# Patient Record
Sex: MALE | Race: WHITE | NOT HISPANIC OR LATINO | Employment: OTHER | URBAN - METROPOLITAN AREA
[De-identification: names, ages, dates, MRNs, and addresses within clinical notes are randomized per-mention and may not be internally consistent; named-entity substitution may affect disease eponyms.]

---

## 2014-01-09 LAB — POTASSIUM SERPL-SCNC: 4.9 MMOL/L (ref 3.6–5.2)

## 2014-06-09 LAB
CHOLEST SERPL-MCNC: 156 MG/DL
CREAT SERPL-MCNC: 1.4 MG/DL (ref 0.8–1.3)
GFR SERPL CREATININE-BSD FRML MDRD: 52 ML/MIN/1.73M2
HDLC SERPL-MCNC: 46 MG/DL
LDLC SERPL CALC-MCNC: 80 MG/DL
NONHDLC SERPL-MCNC: 110 MG/DL
TRIGL SERPL-MCNC: 151 MG/DL

## 2016-09-07 LAB
CREAT SERPL-MCNC: 2.4 MG/DL (ref 0.8–1.3)
GFR SERPL CREATININE-BSD FRML MDRD: 28 ML/MIN/1.73M2
POTASSIUM SERPL-SCNC: 4.7 MMOL/L (ref 3.6–5.2)

## 2016-10-26 LAB
CREAT SERPL-MCNC: 1.9 MG/DL (ref 0.8–1.3)
GFR SERPL CREATININE-BSD FRML MDRD: 36 ML/MIN/1.73M2
POTASSIUM SERPL-SCNC: 4.6 MMOL/L (ref 3.6–5.2)

## 2017-01-03 ENCOUNTER — HOSPITAL ENCOUNTER (OUTPATIENT)
Dept: RESPIRATORY THERAPY | Facility: CLINIC | Age: 62
Discharge: HOME | End: 2017-01-03

## 2017-01-03 ENCOUNTER — AMBULATORY - HEALTHEAST (OUTPATIENT)
Dept: RESPIRATORY THERAPY | Facility: CLINIC | Age: 62
End: 2017-01-03

## 2017-01-03 DIAGNOSIS — Z29.89 NEED FOR PNEUMOCYSTIS PROPHYLAXIS: ICD-10-CM

## 2017-01-06 ENCOUNTER — COMMUNICATION - HEALTHEAST (OUTPATIENT)
Dept: FAMILY MEDICINE | Facility: CLINIC | Age: 62
End: 2017-01-06

## 2017-01-23 ENCOUNTER — COMMUNICATION - HEALTHEAST (OUTPATIENT)
Dept: INTERNAL MEDICINE | Facility: CLINIC | Age: 62
End: 2017-01-23

## 2017-01-23 ENCOUNTER — AMBULATORY - HEALTHEAST (OUTPATIENT)
Dept: LAB | Facility: CLINIC | Age: 62
End: 2017-01-23

## 2017-01-23 DIAGNOSIS — N28.9 NEPHROPATHY: ICD-10-CM

## 2017-01-23 DIAGNOSIS — I26.99 PULMONARY EMBOLISM (H): ICD-10-CM

## 2017-01-26 LAB
CREAT SERPL-MCNC: 1.7 MG/DL (ref 0.8–1.3)
GFR SERPL CREATININE-BSD FRML MDRD: 41 ML/MIN/1.73M2
POTASSIUM SERPL-SCNC: 4.5 MMOL/L (ref 3.6–5.2)

## 2017-02-02 ENCOUNTER — HOSPITAL ENCOUNTER (OUTPATIENT)
Dept: RESPIRATORY THERAPY | Facility: CLINIC | Age: 62
Discharge: HOME | End: 2017-02-02

## 2017-02-02 ENCOUNTER — AMBULATORY - HEALTHEAST (OUTPATIENT)
Dept: CARDIOLOGY | Facility: CLINIC | Age: 62
End: 2017-02-02

## 2017-02-02 ENCOUNTER — COMMUNICATION - HEALTHEAST (OUTPATIENT)
Dept: SCHEDULING | Facility: CLINIC | Age: 62
End: 2017-02-02

## 2017-02-02 DIAGNOSIS — B59 PNEUMOCYSTIS JIROVECI PNEUMONIA (H): ICD-10-CM

## 2017-02-02 DIAGNOSIS — Z29.89 NEED FOR PNEUMOCYSTIS PROPHYLAXIS: ICD-10-CM

## 2017-02-02 DIAGNOSIS — E78.5 HYPERLIPIDEMIA: ICD-10-CM

## 2017-02-06 ENCOUNTER — RECORDS - HEALTHEAST (OUTPATIENT)
Dept: ADMINISTRATIVE | Facility: OTHER | Age: 62
End: 2017-02-06

## 2017-02-09 ENCOUNTER — RECORDS - HEALTHEAST (OUTPATIENT)
Dept: ADMINISTRATIVE | Facility: OTHER | Age: 62
End: 2017-02-09

## 2017-02-10 ENCOUNTER — COMMUNICATION - HEALTHEAST (OUTPATIENT)
Dept: FAMILY MEDICINE | Facility: CLINIC | Age: 62
End: 2017-02-10

## 2017-02-10 DIAGNOSIS — H10.10 ALLERGIC CONJUNCTIVITIS, UNSPECIFIED LATERALITY: ICD-10-CM

## 2017-02-13 ENCOUNTER — RECORDS - HEALTHEAST (OUTPATIENT)
Dept: ADMINISTRATIVE | Facility: OTHER | Age: 62
End: 2017-02-13

## 2017-02-14 ENCOUNTER — COMMUNICATION - HEALTHEAST (OUTPATIENT)
Dept: FAMILY MEDICINE | Facility: CLINIC | Age: 62
End: 2017-02-14

## 2017-02-14 ENCOUNTER — AMBULATORY - HEALTHEAST (OUTPATIENT)
Dept: LAB | Facility: CLINIC | Age: 62
End: 2017-02-14

## 2017-02-14 DIAGNOSIS — N28.9 NEPHROPATHY: ICD-10-CM

## 2017-02-14 DIAGNOSIS — I26.99 PULMONARY EMBOLISM (H): ICD-10-CM

## 2017-02-21 ENCOUNTER — AMBULATORY - HEALTHEAST (OUTPATIENT)
Dept: LAB | Facility: CLINIC | Age: 62
End: 2017-02-21

## 2017-02-21 ENCOUNTER — COMMUNICATION - HEALTHEAST (OUTPATIENT)
Dept: NURSING | Facility: CLINIC | Age: 62
End: 2017-02-21

## 2017-02-21 DIAGNOSIS — N28.9 NEPHROPATHY: ICD-10-CM

## 2017-02-21 DIAGNOSIS — I26.99 PULMONARY EMBOLISM (H): ICD-10-CM

## 2017-02-22 ENCOUNTER — COMMUNICATION - HEALTHEAST (OUTPATIENT)
Dept: INTERNAL MEDICINE | Facility: CLINIC | Age: 62
End: 2017-02-22

## 2017-02-22 ENCOUNTER — COMMUNICATION - HEALTHEAST (OUTPATIENT)
Dept: LAB | Facility: CLINIC | Age: 62
End: 2017-02-22

## 2017-02-22 DIAGNOSIS — I26.99 PULMONARY EMBOLISM (H): ICD-10-CM

## 2017-02-22 DIAGNOSIS — N28.9 NEPHROPATHY: ICD-10-CM

## 2017-03-02 ENCOUNTER — HOSPITAL ENCOUNTER (OUTPATIENT)
Dept: RESPIRATORY THERAPY | Facility: CLINIC | Age: 62
Discharge: HOME | End: 2017-03-02

## 2017-03-02 DIAGNOSIS — B59 PNEUMOCYSTIS JIROVECI PNEUMONIA (H): ICD-10-CM

## 2017-03-09 ENCOUNTER — AMBULATORY - HEALTHEAST (OUTPATIENT)
Dept: LAB | Facility: CLINIC | Age: 62
End: 2017-03-09

## 2017-03-09 ENCOUNTER — COMMUNICATION - HEALTHEAST (OUTPATIENT)
Dept: INTERNAL MEDICINE | Facility: CLINIC | Age: 62
End: 2017-03-09

## 2017-03-09 DIAGNOSIS — I26.99 PULMONARY EMBOLISM (H): ICD-10-CM

## 2017-03-09 DIAGNOSIS — N28.9 NEPHROPATHY: ICD-10-CM

## 2017-03-16 ENCOUNTER — AMBULATORY - HEALTHEAST (OUTPATIENT)
Dept: LAB | Facility: CLINIC | Age: 62
End: 2017-03-16

## 2017-03-16 ENCOUNTER — COMMUNICATION - HEALTHEAST (OUTPATIENT)
Dept: NURSING | Facility: CLINIC | Age: 62
End: 2017-03-16

## 2017-03-16 DIAGNOSIS — N28.9 NEPHROPATHY: ICD-10-CM

## 2017-03-16 DIAGNOSIS — I26.99 PULMONARY EMBOLISM (H): ICD-10-CM

## 2017-03-17 ENCOUNTER — AMBULATORY - HEALTHEAST (OUTPATIENT)
Dept: LAB | Facility: CLINIC | Age: 62
End: 2017-03-17

## 2017-03-17 ENCOUNTER — AMBULATORY - HEALTHEAST (OUTPATIENT)
Dept: FAMILY MEDICINE | Facility: CLINIC | Age: 62
End: 2017-03-17

## 2017-03-17 DIAGNOSIS — N04.20 NEPHROTIC SYNDROME WITH MEMBRANOUS GLOMERULONEPHRITIS: ICD-10-CM

## 2017-03-17 LAB
CREAT SERPL-MCNC: 1.66 MG/DL (ref 0.7–1.3)
GFR SERPL CREATININE-BSD FRML MDRD: 42 ML/MIN/1.73M2

## 2017-03-20 ENCOUNTER — COMMUNICATION - HEALTHEAST (OUTPATIENT)
Dept: SCHEDULING | Facility: CLINIC | Age: 62
End: 2017-03-20

## 2017-03-20 DIAGNOSIS — E78.5 HYPERLIPIDEMIA: ICD-10-CM

## 2017-04-03 ENCOUNTER — COMMUNICATION - HEALTHEAST (OUTPATIENT)
Dept: INTERNAL MEDICINE | Facility: CLINIC | Age: 62
End: 2017-04-03

## 2017-04-03 ENCOUNTER — HOSPITAL ENCOUNTER (OUTPATIENT)
Dept: RESPIRATORY THERAPY | Facility: CLINIC | Age: 62
Discharge: HOME | End: 2017-04-03

## 2017-04-03 ENCOUNTER — AMBULATORY - HEALTHEAST (OUTPATIENT)
Dept: RESPIRATORY THERAPY | Facility: CLINIC | Age: 62
End: 2017-04-03

## 2017-04-03 ENCOUNTER — AMBULATORY - HEALTHEAST (OUTPATIENT)
Dept: CARDIOLOGY | Facility: CLINIC | Age: 62
End: 2017-04-03

## 2017-04-03 ENCOUNTER — AMBULATORY - HEALTHEAST (OUTPATIENT)
Dept: LAB | Facility: CLINIC | Age: 62
End: 2017-04-03

## 2017-04-03 DIAGNOSIS — N28.9 NEPHROPATHY: ICD-10-CM

## 2017-04-03 DIAGNOSIS — I26.99 PULMONARY EMBOLISM (H): ICD-10-CM

## 2017-04-03 DIAGNOSIS — Z29.89 NEED FOR PNEUMOCYSTIS PROPHYLAXIS: ICD-10-CM

## 2017-04-12 ENCOUNTER — COMMUNICATION - HEALTHEAST (OUTPATIENT)
Dept: INTERNAL MEDICINE | Facility: CLINIC | Age: 62
End: 2017-04-12

## 2017-04-12 ENCOUNTER — AMBULATORY - HEALTHEAST (OUTPATIENT)
Dept: LAB | Facility: CLINIC | Age: 62
End: 2017-04-12

## 2017-04-12 DIAGNOSIS — N28.9 NEPHROPATHY: ICD-10-CM

## 2017-04-12 DIAGNOSIS — I26.99 PULMONARY EMBOLISM (H): ICD-10-CM

## 2017-04-19 ENCOUNTER — AMBULATORY - HEALTHEAST (OUTPATIENT)
Dept: LAB | Facility: CLINIC | Age: 62
End: 2017-04-19

## 2017-04-19 ENCOUNTER — COMMUNICATION - HEALTHEAST (OUTPATIENT)
Dept: INTERNAL MEDICINE | Facility: CLINIC | Age: 62
End: 2017-04-19

## 2017-04-19 DIAGNOSIS — N28.9 NEPHROPATHY: ICD-10-CM

## 2017-04-19 DIAGNOSIS — I26.99 PULMONARY EMBOLISM (H): ICD-10-CM

## 2017-05-01 ENCOUNTER — COMMUNICATION - HEALTHEAST (OUTPATIENT)
Dept: FAMILY MEDICINE | Facility: CLINIC | Age: 62
End: 2017-05-01

## 2017-05-01 DIAGNOSIS — I26.99 PULMONARY EMBOLISM (H): ICD-10-CM

## 2017-05-03 ENCOUNTER — AMBULATORY - HEALTHEAST (OUTPATIENT)
Dept: LAB | Facility: CLINIC | Age: 62
End: 2017-05-03

## 2017-05-03 ENCOUNTER — COMMUNICATION - HEALTHEAST (OUTPATIENT)
Dept: INTERNAL MEDICINE | Facility: CLINIC | Age: 62
End: 2017-05-03

## 2017-05-03 DIAGNOSIS — N28.9 NEPHROPATHY: ICD-10-CM

## 2017-05-03 DIAGNOSIS — I26.99 PULMONARY EMBOLISM (H): ICD-10-CM

## 2017-05-30 ENCOUNTER — RECORDS - HEALTHEAST (OUTPATIENT)
Dept: ADMINISTRATIVE | Facility: OTHER | Age: 62
End: 2017-05-30

## 2017-05-30 LAB
CREAT SERPL-MCNC: 1.6 MG/DL (ref 0.8–1.3)
GFR SERPL CREATININE-BSD FRML MDRD: 44 ML/MIN/1.73M2
POTASSIUM SERPL-SCNC: 4.4 MMOL/L (ref 3.6–5.2)

## 2017-05-31 ENCOUNTER — COMMUNICATION - HEALTHEAST (OUTPATIENT)
Dept: INTERNAL MEDICINE | Facility: CLINIC | Age: 62
End: 2017-05-31

## 2017-05-31 ENCOUNTER — AMBULATORY - HEALTHEAST (OUTPATIENT)
Dept: LAB | Facility: CLINIC | Age: 62
End: 2017-05-31

## 2017-05-31 DIAGNOSIS — I26.99 PULMONARY EMBOLISM (H): ICD-10-CM

## 2017-05-31 DIAGNOSIS — N28.9 NEPHROPATHY: ICD-10-CM

## 2017-06-01 ENCOUNTER — COMMUNICATION - HEALTHEAST (OUTPATIENT)
Dept: FAMILY MEDICINE | Facility: CLINIC | Age: 62
End: 2017-06-01

## 2017-06-01 DIAGNOSIS — N28.9 NEPHROPATHY: ICD-10-CM

## 2017-06-12 ENCOUNTER — COMMUNICATION - HEALTHEAST (OUTPATIENT)
Dept: INTERNAL MEDICINE | Facility: CLINIC | Age: 62
End: 2017-06-12

## 2017-06-12 ENCOUNTER — AMBULATORY - HEALTHEAST (OUTPATIENT)
Dept: LAB | Facility: CLINIC | Age: 62
End: 2017-06-12

## 2017-06-12 DIAGNOSIS — N28.9 NEPHROPATHY: ICD-10-CM

## 2017-06-12 DIAGNOSIS — I26.99 PULMONARY EMBOLISM (H): ICD-10-CM

## 2017-07-03 ENCOUNTER — COMMUNICATION - HEALTHEAST (OUTPATIENT)
Dept: FAMILY MEDICINE | Facility: CLINIC | Age: 62
End: 2017-07-03

## 2017-07-03 DIAGNOSIS — H10.10 ALLERGIC CONJUNCTIVITIS, UNSPECIFIED LATERALITY: ICD-10-CM

## 2017-07-06 ENCOUNTER — AMBULATORY - HEALTHEAST (OUTPATIENT)
Dept: LAB | Facility: CLINIC | Age: 62
End: 2017-07-06

## 2017-07-06 ENCOUNTER — COMMUNICATION - HEALTHEAST (OUTPATIENT)
Dept: INTERNAL MEDICINE | Facility: CLINIC | Age: 62
End: 2017-07-06

## 2017-07-06 DIAGNOSIS — N28.9 NEPHROPATHY: ICD-10-CM

## 2017-07-06 DIAGNOSIS — I26.99 PULMONARY EMBOLISM (H): ICD-10-CM

## 2017-07-31 LAB
CREAT SERPL-MCNC: 1.5 MG/DL (ref 0.5–1.3)
GFR SERPL CREATININE-BSD FRML MDRD: 47 ML/MIN/1.73M2

## 2017-08-03 ENCOUNTER — COMMUNICATION - HEALTHEAST (OUTPATIENT)
Dept: INTERNAL MEDICINE | Facility: CLINIC | Age: 62
End: 2017-08-03

## 2017-08-03 ENCOUNTER — AMBULATORY - HEALTHEAST (OUTPATIENT)
Dept: LAB | Facility: CLINIC | Age: 62
End: 2017-08-03

## 2017-08-03 DIAGNOSIS — N28.9 NEPHROPATHY: ICD-10-CM

## 2017-08-03 DIAGNOSIS — I26.99 PULMONARY EMBOLISM (H): ICD-10-CM

## 2017-08-04 ENCOUNTER — COMMUNICATION - HEALTHEAST (OUTPATIENT)
Dept: FAMILY MEDICINE | Facility: CLINIC | Age: 62
End: 2017-08-04

## 2017-08-04 DIAGNOSIS — N28.9 NEPHROPATHY: ICD-10-CM

## 2017-08-17 ENCOUNTER — COMMUNICATION - HEALTHEAST (OUTPATIENT)
Dept: NURSING | Facility: CLINIC | Age: 62
End: 2017-08-17

## 2017-08-17 ENCOUNTER — AMBULATORY - HEALTHEAST (OUTPATIENT)
Dept: LAB | Facility: CLINIC | Age: 62
End: 2017-08-17

## 2017-08-17 DIAGNOSIS — N28.9 NEPHROPATHY: ICD-10-CM

## 2017-08-17 DIAGNOSIS — I26.99 PULMONARY EMBOLISM (H): ICD-10-CM

## 2017-09-12 ENCOUNTER — RECORDS - HEALTHEAST (OUTPATIENT)
Dept: ADMINISTRATIVE | Facility: OTHER | Age: 62
End: 2017-09-12

## 2017-09-12 ENCOUNTER — TRANSFERRED RECORDS (OUTPATIENT)
Dept: HEALTH INFORMATION MANAGEMENT | Facility: CLINIC | Age: 62
End: 2017-09-12

## 2017-09-12 LAB
CREAT SERPL-MCNC: 1.4 MG/DL (ref 0.8–1.3)
GFR SERPL CREATININE-BSD FRML MDRD: 51 ML/MIN/1.73M2
POTASSIUM SERPL-SCNC: 4.3 MMOL/L (ref 3.6–5.2)
POTASSIUM SERPL-SCNC: 4.7 MMOL/L (ref 3.6–5.2)

## 2017-09-18 ENCOUNTER — RECORDS - HEALTHEAST (OUTPATIENT)
Dept: ADMINISTRATIVE | Facility: OTHER | Age: 62
End: 2017-09-18

## 2017-09-25 ENCOUNTER — COMMUNICATION - HEALTHEAST (OUTPATIENT)
Dept: INTERNAL MEDICINE | Facility: CLINIC | Age: 62
End: 2017-09-25

## 2017-09-26 ENCOUNTER — AMBULATORY - HEALTHEAST (OUTPATIENT)
Dept: LAB | Facility: CLINIC | Age: 62
End: 2017-09-26

## 2017-09-26 ENCOUNTER — COMMUNICATION - HEALTHEAST (OUTPATIENT)
Dept: INTERNAL MEDICINE | Facility: CLINIC | Age: 62
End: 2017-09-26

## 2017-09-26 DIAGNOSIS — I26.99 PULMONARY EMBOLISM (H): ICD-10-CM

## 2017-09-26 DIAGNOSIS — N28.9 NEPHROPATHY: ICD-10-CM

## 2017-09-29 ENCOUNTER — OFFICE VISIT (OUTPATIENT)
Dept: FAMILY MEDICINE | Facility: CLINIC | Age: 62
End: 2017-09-29
Payer: COMMERCIAL

## 2017-09-29 VITALS
HEART RATE: 62 BPM | BODY MASS INDEX: 24.68 KG/M2 | WEIGHT: 153.6 LBS | RESPIRATION RATE: 16 BRPM | DIASTOLIC BLOOD PRESSURE: 86 MMHG | SYSTOLIC BLOOD PRESSURE: 126 MMHG | TEMPERATURE: 98.2 F | HEIGHT: 66 IN | OXYGEN SATURATION: 98 %

## 2017-09-29 DIAGNOSIS — I10 BENIGN ESSENTIAL HYPERTENSION: ICD-10-CM

## 2017-09-29 DIAGNOSIS — Z23 NEED FOR TETANUS BOOSTER: ICD-10-CM

## 2017-09-29 DIAGNOSIS — Z00.00 VISIT FOR PREVENTIVE HEALTH EXAMINATION: Primary | ICD-10-CM

## 2017-09-29 DIAGNOSIS — L98.9 SKIN LESION: ICD-10-CM

## 2017-09-29 DIAGNOSIS — Z23 NEED FOR PROPHYLACTIC VACCINATION AND INOCULATION AGAINST INFLUENZA: ICD-10-CM

## 2017-09-29 DIAGNOSIS — R06.83 SNORING: ICD-10-CM

## 2017-09-29 DIAGNOSIS — N05.2 MEMBRANOUS GLOMERULONEPHRITIS: ICD-10-CM

## 2017-09-29 DIAGNOSIS — E78.5 DYSLIPIDEMIA: ICD-10-CM

## 2017-09-29 DIAGNOSIS — Z12.11 SPECIAL SCREENING FOR MALIGNANT NEOPLASMS, COLON: ICD-10-CM

## 2017-09-29 PROBLEM — Z22.7 LATENT TUBERCULOSIS: Status: ACTIVE | Noted: 2017-09-29

## 2017-09-29 PROBLEM — F33.9 MAJOR DEPRESSION, RECURRENT (H): Status: ACTIVE | Noted: 2017-09-29

## 2017-09-29 PROCEDURE — 90686 IIV4 VACC NO PRSV 0.5 ML IM: CPT | Performed by: PHYSICIAN ASSISTANT

## 2017-09-29 PROCEDURE — 90471 IMMUNIZATION ADMIN: CPT | Performed by: PHYSICIAN ASSISTANT

## 2017-09-29 PROCEDURE — 90472 IMMUNIZATION ADMIN EACH ADD: CPT | Performed by: PHYSICIAN ASSISTANT

## 2017-09-29 PROCEDURE — 90715 TDAP VACCINE 7 YRS/> IM: CPT | Performed by: PHYSICIAN ASSISTANT

## 2017-09-29 PROCEDURE — 99386 PREV VISIT NEW AGE 40-64: CPT | Mod: 25 | Performed by: PHYSICIAN ASSISTANT

## 2017-09-29 RX ORDER — WARFARIN SODIUM 5 MG/1
TABLET ORAL
Refills: 0 | COMMUNITY
Start: 2017-08-09 | End: 2018-03-13

## 2017-09-29 RX ORDER — VALACYCLOVIR HYDROCHLORIDE 500 MG/1
TABLET, FILM COATED ORAL
Refills: 0 | COMMUNITY
Start: 2017-08-19 | End: 2018-04-19

## 2017-09-29 RX ORDER — PRAVASTATIN SODIUM 40 MG
TABLET ORAL
Qty: 90 TABLET | Refills: 0 | Status: SHIPPED | OUTPATIENT
Start: 2017-09-29 | End: 2017-12-24

## 2017-09-29 RX ORDER — LISINOPRIL 20 MG/1
20 TABLET ORAL
Refills: 3 | COMMUNITY
Start: 2017-04-09 | End: 2017-09-29

## 2017-09-29 RX ORDER — LISINOPRIL 20 MG/1
20 TABLET ORAL DAILY
Qty: 90 TABLET | Refills: 0 | Status: SHIPPED | OUTPATIENT
Start: 2017-09-29 | End: 2017-12-24

## 2017-09-29 RX ORDER — OLOPATADINE HYDROCHLORIDE 1 MG/ML
SOLUTION/ DROPS OPHTHALMIC
Refills: 0 | COMMUNITY
Start: 2017-07-04 | End: 2018-04-19

## 2017-09-29 RX ORDER — PRAVASTATIN SODIUM 40 MG
TABLET ORAL
Refills: 0 | COMMUNITY
Start: 2017-06-25 | End: 2017-09-29

## 2017-09-29 NOTE — PROGRESS NOTES
Injectable Influenza Immunization Documentation    1.  Is the person to be vaccinated sick today?   No    2. Does the person to be vaccinated have an allergy to a component   of the vaccine?   No    3. Has the person to be vaccinated ever had a serious reaction   to influenza vaccine in the past?   No    4. Has the person to be vaccinated ever had Guillain-Barré syndrome?   No    Form completed by Irina Castañeda

## 2017-09-29 NOTE — NURSING NOTE
"Chief Complaint   Patient presents with     Physical       Initial /86 (BP Location: Right arm, Patient Position: Chair, Cuff Size: Adult Regular)  Pulse 62  Temp 98.2  F (36.8  C) (Oral)  Resp 16  Ht 5' 6\" (1.676 m)  Wt 153 lb 9.6 oz (69.7 kg)  SpO2 98%  BMI 24.79 kg/m2 Estimated body mass index is 24.79 kg/(m^2) as calculated from the following:    Height as of this encounter: 5' 6\" (1.676 m).    Weight as of this encounter: 153 lb 9.6 oz (69.7 kg).  Medication Reconciliation: complete   "

## 2017-09-29 NOTE — MR AVS SNAPSHOT
After Visit Summary   9/29/2017    Harley Alvarado    MRN: [id number]           Patient Information     Date Of Birth          1955        Visit Information        Provider Department      9/29/2017 1:00 PM Cory Acosta PA-C [facility]        Today's Diagnoses     Visit for preventive health examination    -  1    Special screening for malignant neoplasms, colon        Dyslipidemia        Membranous glomerulonephritis        Benign essential hypertension        Snoring          Care Instructions      Preventive Health Recommendations  Male Ages 50 - 64    Yearly exam:             See your health care provider every year in order to  o   Review health changes.   o   Discuss preventive care.    o   Review your medicines if your doctor has prescribed any.     Have a cholesterol test every 5 years, or more frequently if you are at risk for high cholesterol/heart disease.     Have a diabetes test (fasting glucose) every three years. If you are at risk for diabetes, you should have this test more often.     Have a colonoscopy at age 50, or have a yearly FIT test (stool test). These exams will check for colon cancer.      Talk with your health care provider about whether or not a prostate cancer screening test (PSA) is right for you.    You should be tested each year for STDs (sexually transmitted diseases), if you re at risk.     Shots: Get a flu shot each year. Get a tetanus shot every 10 years.     Nutrition:    Eat at least 5 servings of fruits and vegetables daily.     Eat whole-grain bread, whole-wheat pasta and brown rice instead of white grains and rice.     Talk to your provider about Calcium and Vitamin D.     Lifestyle    Exercise for at least 150 minutes a week (30 minutes a day, 5 days a week). This will help you control your weight and prevent disease.     Limit alcohol to one drink per day.     No smoking.     Wear sunscreen to prevent skin cancer.     See  your dentist every six months for an exam and cleaning.     See your eye doctor every 1 to 2 years.    Preventive Health Recommendations  Male Ages 50 - 64    Yearly exam:             See your health care provider every year in order to  o   Review health changes.   o   Discuss preventive care.    o   Review your medicines if your doctor has prescribed any.     Have a cholesterol test every 5 years, or more frequently if you are at risk for high cholesterol/heart disease.     Have a diabetes test (fasting glucose) every three years. If you are at risk for diabetes, you should have this test more often.     Have a colonoscopy at age 50, or have a yearly FIT test (stool test). These exams will check for colon cancer.      Talk with your health care provider about whether or not a prostate cancer screening test (PSA) is right for you.    You should be tested each year for STDs (sexually transmitted diseases), if you re at risk.     Shots: Get a flu shot each year. Get a tetanus shot every 10 years.     Nutrition:    Eat at least 5 servings of fruits and vegetables daily.     Eat whole-grain bread, whole-wheat pasta and brown rice instead of white grains and rice.     Talk to your provider about Calcium and Vitamin D.     Lifestyle    Exercise for at least 150 minutes a week (30 minutes a day, 5 days a week). This will help you control your weight and prevent disease.     Limit alcohol to one drink per day.     No smoking.     Wear sunscreen to prevent skin cancer.     See your dentist every six months for an exam and cleaning.     See your eye doctor every 1 to 2 years.            Follow-ups after your visit        Additional Services     GASTROENTEROLOGY ADULT REF PROCEDURE ONLY       Last Lab Result: No results found for: CR  Body mass index is 24.79 kg/(m^2).     Needed:  No  Language:  English    Patient will be contacted to schedule procedure.     Please be aware that coverage of these services is  subject to the terms and limitations of your health insurance plan.  Call member services at your health plan with any benefit or coverage questions.  Any procedures must be performed at a [facility] facility OR coordinated by your clinic's referral office.    Please bring the following with you to your appointment:    (1) Any X-Rays, CTs or MRIs which have been performed.  Contact the facility where they were done to arrange for  prior to your scheduled appointment.    (2) List of current medications   (3) This referral request   (4) Any documents/labs given to you for this referral            SLEEP EVALUATION & MANAGEMENT REFERRAL - ADULT       Please be aware that coverage of these services is subject to the terms and limitations of your health insurance plan.  Call member services at your health plan with any benefit or coverage questions.      Please bring the following to your appointment:    >>   List of current medications   >>   This referral request   >>   Any documents/labs given to you for this referral    [facility] [phone] (Age 18 and up)                  Future tests that were ordered for you today     Open Future Orders        Priority Expected Expires Ordered    Lipid panel reflex to direct LDL Routine  9/29/2018 9/29/2017    SLEEP EVALUATION & MANAGEMENT REFERRAL - ADULT Routine  9/29/2018 9/29/2017            Who to contact     If you have questions or need follow up information about today's clinic visit or your schedule please contact [facility] directly at [phone].  Normal or non-critical lab and imaging results will be communicated to you by MyChart, letter or phone within 4 business days after the clinic has received the results. If you do not hear from us within 7 days, please contact the clinic through MyChart or phone. If you have a critical or abnormal lab result, we will notify you by phone as soon as possible.  Submit refill  "requests through [employer] or call your pharmacy and they will forward the refill request to us. Please allow 3 business days for your refill to be completed.          Additional Information About Your Visit        [employer] Information     [employer] lets you send messages to your doctor, view your test results, renew your prescriptions, schedule appointments and more. To sign up, go to [email or web address] . Click on \"Log in\" on the left side of the screen, which will take you to the Welcome page. Then click on \"Sign up Now\" on the right side of the page.     You will be asked to enter the access code listed below, as well as some personal information. Please follow the directions to create your username and password.     Your access code is: [email or web address]  Expires: 2017  2:04 PM     Your access code will  in 90 days. If you need help or a new code, please call your [facility] clinic or [phone].        Care EveryWhere ID     This is your Care EveryWhere ID. This could be used by other organizations to access your [facility] medical records  [id number]        Your Vitals Were     Pulse Temperature Respirations Height Pulse Oximetry BMI (Body Mass Index)    62 98.2  F (36.8  C) (Oral) 16 5' 6\" (1.676 m) 98% 24.79 kg/m2       Blood Pressure from Last 3 Encounters:   17 126/86    Weight from Last 3 Encounters:   17 153 lb 9.6 oz (69.7 kg)              We Performed the Following     GASTROENTEROLOGY ADULT REF PROCEDURE ONLY          Today's Medication Changes          These changes are accurate as of: 17  2:04 PM.  If you have any questions, ask your nurse or doctor.               These medicines have changed or have updated prescriptions.        Dose/Directions    lisinopril 20 MG tablet   Commonly known as:  PRINIVIL/ZESTRIL   This may have changed:    - how to take this  - when to take this   Used for:  Membranous glomerulonephritis, Benign essential hypertension   Changed by:  Dave " Cory Medeiros PA-C        Dose:  20 mg   Take 1 tablet (20 mg) by mouth daily   Quantity:  90 tablet   Refills:  0       pravastatin 40 MG tablet   Commonly known as:  PRAVACHOL   This may have changed:  See the new instructions.   Used for:  Dyslipidemia   Changed by:  Cory Acosta PA-C        TK 1 T PO QHS   Quantity:  90 tablet   Refills:  0            Where to get your medicines      These medications were sent to [facility] [address] [place] [address] [id number] [place] [address]  [id number] [place] [id number]    Hours:  24-hours Phone:  [phone]     lisinopril 20 MG tablet    pravastatin 40 MG tablet                Primary Care Provider    None Specified       No primary provider on file.        Equal Access to Services     TITUS LEE : Gerber berkowitz Soayden, waaxda luqadaha, qaybta kaalmada adeegyada, essie larkin . So [facility] [phone].    ATENCIÓN: Si habla español, tiene a dubon disposición servicios gratuitos de asistencia lingüística. Llame al [phone].    We comply with applicable federal civil rights laws and [place] laws. We do not discriminate on the basis of race, color, national origin, age, disability, sex, sexual orientation, or gender identity.            Thank you!     Thank you for choosing [facility]  for your care. Our goal is always to provide you with excellent care. Hearing back from our patients is one way we can continue to improve our services. Please take a few minutes to complete the written survey that you may receive in the mail after your visit with us. Thank you!             Your Updated Medication List - Protect others around you: Learn how to safely use, store and throw away your medicines at www.disposemymeds.org.          This list is accurate as of: 9/29/17  2:04 PM.  Always use your most recent med list.                   Brand Name Dispense Instructions  for use Diagnosis    lisinopril 20 MG tablet    PRINIVIL/ZESTRIL    90 tablet    Take 1 tablet (20 mg) by mouth daily    Membranous glomerulonephritis, Benign essential hypertension       olopatadine 0.1 % ophthalmic solution    PATANOL     INT 1 GTT IN OU BID        pravastatin 40 MG tablet    PRAVACHOL    90 tablet    TK 1 T PO QHS    Dyslipidemia       valACYclovir 500 MG tablet    VALTREX     TAKE ONE TABLET PO DAILY FOR HERPES/SHINGLES WHILE ON CYCLOSPORIN        warfarin 5 MG tablet    COUMADIN

## 2017-09-29 NOTE — PROGRESS NOTES
Physical           SUBJECTIVE:   CC: Harley Alvarado is an 61 year old male who presents for preventative health visit.     Healthy Habits:    Do you get at least three servings of calcium containing foods daily (dairy, green leafy vegetables, etc.)? yes    Amount of exercise or daily activities, outside of work: 3 day(s) per week    Problems taking medications regularly No    Medication side effects: No    Have you had an eye exam in the past two years? yes    Do you see a dentist twice per year? yes  Do you have sleep apnea, excessive snoring or daytime drowsiness?yes    Patient has history of pulmonary embolism 3 years ago as well as membranous glomerulonephritis followed by nephrology through [facility]. States has remained on warfarin chronically because of this per nephrology recommendations.       Today's PHQ-2 Score:   PHQ-2 ( 1999 Pfizer) 9/29/2017   Q1: Little interest or pleasure in doing things 1   Q2: Feeling down, depressed or hopeless 0   PHQ-2 Score 1   Q1: Little interest or pleasure in doing things Several days   Q2: Feeling down, depressed or hopeless Not at all   PHQ-2 Score 1         Abuse: Current or Past(Physical, Sexual or Emotional)- No  Do you feel safe in your environment - yes     Social History   Substance Use Topics     Smoking status: Never Smoker     Smokeless tobacco: Never Used     Alcohol use Not on file     The patient does not drink >3 drinks per day nor >7 drinks per week.    Last PSA: No results found for: PSA    Reviewed orders with patient. Reviewed health maintenance and updated orders accordingly - Yes  BP Readings from Last 3 Encounters:   09/29/17 126/86    Wt Readings from Last 3 Encounters:   09/29/17 153 lb 9.6 oz (69.7 kg)                  Patient Active Problem List   Diagnosis     Allergic conjunctivitis     Dyslipidemia     GERD (gastroesophageal reflux disease)     Latent tuberculosis     Major depression, recurrent (H)     Membranous glomerulonephritis      Nephropathy     Pulmonary embolism (H)     Benign essential hypertension     Past Surgical History:   Procedure Laterality Date     HERNIA REPAIR  2015       Social History   Substance Use Topics     Smoking status: Never Smoker     Smokeless tobacco: Never Used     Alcohol use Not on file     No family history on file.      Current Outpatient Prescriptions   Medication Sig Dispense Refill     olopatadine (PATANOL) 0.1 % ophthalmic solution INT 1 GTT IN OU BID  0     warfarin (COUMADIN) 5 MG tablet   0     valACYclovir (VALTREX) 500 MG tablet TAKE ONE TABLET PO DAILY FOR HERPES/SHINGLES WHILE ON CYCLOSPORIN  0     pravastatin (PRAVACHOL) 40 MG tablet TK 1 T PO QHS 90 tablet 0     lisinopril (PRINIVIL/ZESTRIL) 20 MG tablet Take 1 tablet (20 mg) by mouth daily 90 tablet 0     [DISCONTINUED] lisinopril (PRINIVIL/ZESTRIL) 20 MG tablet 20 mg  3     [DISCONTINUED] pravastatin (PRAVACHOL) 40 MG tablet TK 1 T PO QHS  0     Not on File        Reviewed and updated as needed this visit by clinical staffTobacco  Allergies  Meds  Problems  Med Hx  Surg Hx  Soc Hx        Reviewed and updated as needed this visit by Provider  Allergies  Meds  Problems        Past Medical History:   Diagnosis Date     Allergic conjunctivitis 4/13/2015     Benign essential hypertension 9/29/2017     Dyslipidemia 9/29/2017    Overview:  Created by Conversion     GERD (gastroesophageal reflux disease) 2/6/2016     Latent tuberculosis 9/29/2017    Overview:  Created by Conversion     Major depression, recurrent (H) 9/29/2017    Overview:  Created by Conversion  Replacement Utility updated for latest IMO load     Membranous glomerulonephritis 9/29/2017    Overview:  Created by Conversion [facility] Annotation: Aug 26 2011  5:18PM - Angely Pat: Stage 1, s/p biopsy  8/2011  Replacement Utility updated for latest IMO load     Nephropathy 12/30/2014     Pulmonary embolism (H) 12/30/2014      Past Surgical History:   Procedure Laterality Date      "HERNIA REPAIR  2015       ROS:  C: NEGATIVE for fever, chills, change in weight  I: NEGATIVE for worrisome rashes, moles or lesions  E: NEGATIVE for vision changes or irritation  ENT: NEGATIVE for ear, mouth and throat problems  R: NEGATIVE for significant cough or SOB  CV: NEGATIVE for chest pain, palpitations or peripheral edema  GI: NEGATIVE for nausea, abdominal pain, heartburn, or change in bowel habits   male: negative for dysuria, hematuria, decreased urinary stream, erectile dysfunction, urethral discharge  M: NEGATIVE for significant arthralgias or myalgia  N: NEGATIVE for weakness, dizziness or paresthesias  P: NEGATIVE for changes in mood or affect    OBJECTIVE:   /86 (BP Location: Right arm, Patient Position: Chair, Cuff Size: Adult Regular)  Pulse 62  Temp 98.2  F (36.8  C) (Oral)  Resp 16  Ht 5' 6\" (1.676 m)  Wt 153 lb 9.6 oz (69.7 kg)  SpO2 98%  BMI 24.79 kg/m2  EXAM:  GENERAL: alert and no distress  EYES: Eyes grossly normal to inspection, PERRL and conjunctivae and sclerae normal  HENT: ear canals and TM's normal, nose and mouth without ulcers or lesions  NECK: no adenopathy, no asymmetry, masses, or scars and thyroid normal to palpation  RESP: lungs clear to auscultation - no rales, rhonchi or wheezes  CV: regular rate and rhythm, normal S1 S2, no S3 or S4, no murmur, click or rub, no peripheral edema and peripheral pulses strong  ABDOMEN: soft, nontender, no hepatosplenomegaly, no masses and bowel sounds normal  MS: no gross musculoskeletal defects noted, no edema  SKIN: asymmetric skin lesions noted on left shoulder and left forarm. Wart appeared on left chest. Otherwise, no suspicious lesions or rashes  NEURO: Normal strength and tone, mentation intact and speech normal  PSYCH: mentation appears normal, affect normal/bright  LYMPH: normal ant/post cervical, supraclavicular nodes    ASSESSMENT/PLAN:   (Z00.00) Visit for preventive health examination  (primary encounter " "diagnosis)  Comment: not fasting. States had labs checked at [facility]. Unclear which ones. Will have sent to me to avoid unnecessary retesting. Follow up for fasting labs at procedure only.    Plan: Lipid panel reflex to direct LDL            (Z12.11) Special screening for malignant neoplasms, colon  Comment:   Plan: GASTROENTEROLOGY ADULT REF PROCEDURE ONLY            (E78.5) Dyslipidemia  Comment:   Plan: pravastatin (PRAVACHOL) 40 MG tablet            (N05.2) Membranous glomerulonephritis  Comment:   Plan: lisinopril (PRINIVIL/ZESTRIL) 20 MG tablet            (I10) Benign essential hypertension  Comment:   Plan: lisinopril (PRINIVIL/ZESTRIL) 20 MG tablet            (R06.83) Snoring  Comment:   Plan: SLEEP EVALUATION & MANAGEMENT REFERRAL - ADULT            (L98.9) Skin lesion  Comment: noted on exam. Recommending biopsies. Follow up for lab only.   Plan:     COUNSELING:  Reviewed preventive health counseling, as reflected in patient instructions       Regular exercise       Healthy diet/nutrition       Colon cancer screening       Prostate cancer screening           reports that he has never smoked. He has never used smokeless tobacco.      Estimated body mass index is 24.79 kg/(m^2) as calculated from the following:    Height as of this encounter: 5' 6\" (1.676 m).    Weight as of this encounter: 153 lb 9.6 oz (69.7 kg).         Counseling Resources:  ATP IV Guidelines  Pooled Cohorts Equation Calculator  FRAX Risk Assessment  ICSI Preventive Guidelines  Dietary Guidelines for Americans, 2010  USDA's MyPlate  ASA Prophylaxis  Lung CA Screening    Cory Acosta PA-C  [facility]  Answers for HPI/ROS submitted by the patient on 9/29/2017   PHQ-2 Score: 1    "

## 2017-10-02 ENCOUNTER — TELEPHONE (OUTPATIENT)
Dept: FAMILY MEDICINE | Facility: CLINIC | Age: 62
End: 2017-10-02

## 2017-10-02 ENCOUNTER — OFFICE VISIT (OUTPATIENT)
Dept: FAMILY MEDICINE | Facility: CLINIC | Age: 62
End: 2017-10-02
Payer: COMMERCIAL

## 2017-10-02 VITALS
HEART RATE: 59 BPM | SYSTOLIC BLOOD PRESSURE: 113 MMHG | OXYGEN SATURATION: 97 % | WEIGHT: 152 LBS | BODY MASS INDEX: 24.53 KG/M2 | DIASTOLIC BLOOD PRESSURE: 73 MMHG | TEMPERATURE: 98.2 F

## 2017-10-02 DIAGNOSIS — E78.5 DYSLIPIDEMIA: ICD-10-CM

## 2017-10-02 DIAGNOSIS — L98.9 SKIN LESION: ICD-10-CM

## 2017-10-02 DIAGNOSIS — N05.2 MEMBRANOUS GLOMERULONEPHRITIS: Primary | ICD-10-CM

## 2017-10-02 DIAGNOSIS — Z00.00 VISIT FOR PREVENTIVE HEALTH EXAMINATION: ICD-10-CM

## 2017-10-02 LAB
ALBUMIN SERPL-MCNC: 3.6 G/DL (ref 3.4–5)
ALBUMIN UR-MCNC: NEGATIVE MG/DL
ALP SERPL-CCNC: 60 U/L (ref 40–150)
ALT SERPL W P-5'-P-CCNC: 28 U/L (ref 0–70)
ANION GAP SERPL CALCULATED.3IONS-SCNC: 6 MMOL/L (ref 3–14)
APPEARANCE UR: CLEAR
AST SERPL W P-5'-P-CCNC: 24 U/L (ref 0–45)
BILIRUB SERPL-MCNC: 0.5 MG/DL (ref 0.2–1.3)
BILIRUB UR QL STRIP: NEGATIVE
BUN SERPL-MCNC: 26 MG/DL (ref 7–30)
CALCIUM SERPL-MCNC: 9.3 MG/DL (ref 8.5–10.1)
CHLORIDE SERPL-SCNC: 106 MMOL/L (ref 94–109)
CHOLEST SERPL-MCNC: 150 MG/DL
CO2 SERPL-SCNC: 28 MMOL/L (ref 20–32)
COLOR UR AUTO: YELLOW
CREAT SERPL-MCNC: 1.38 MG/DL (ref 0.66–1.25)
CREAT UR-MCNC: 56 MG/DL
GFR SERPL CREATININE-BSD FRML MDRD: 52 ML/MIN/1.7M2
GLUCOSE SERPL-MCNC: 102 MG/DL (ref 70–99)
GLUCOSE UR STRIP-MCNC: NEGATIVE MG/DL
HDLC SERPL-MCNC: 44 MG/DL
HGB UR QL STRIP: NEGATIVE
KETONES UR STRIP-MCNC: NEGATIVE MG/DL
LDLC SERPL CALC-MCNC: 82 MG/DL
LEUKOCYTE ESTERASE UR QL STRIP: NEGATIVE
MICROALBUMIN UR-MCNC: 29 MG/L
MICROALBUMIN/CREAT UR: 51.95 MG/G CR (ref 0–17)
NITRATE UR QL: NEGATIVE
NONHDLC SERPL-MCNC: 106 MG/DL
PH UR STRIP: 6 PH (ref 5–7)
POTASSIUM SERPL-SCNC: 4.2 MMOL/L (ref 3.4–5.3)
PROT SERPL-MCNC: 7.3 G/DL (ref 6.8–8.8)
SODIUM SERPL-SCNC: 140 MMOL/L (ref 133–144)
SOURCE: NORMAL
SP GR UR STRIP: 1.01 (ref 1–1.03)
TRIGL SERPL-MCNC: 119 MG/DL
UROBILINOGEN UR STRIP-ACNC: 0.2 EU/DL (ref 0.2–1)

## 2017-10-02 PROCEDURE — 36415 COLL VENOUS BLD VENIPUNCTURE: CPT | Performed by: PHYSICIAN ASSISTANT

## 2017-10-02 PROCEDURE — 80053 COMPREHEN METABOLIC PANEL: CPT | Performed by: PHYSICIAN ASSISTANT

## 2017-10-02 PROCEDURE — 88305 TISSUE EXAM BY PATHOLOGIST: CPT | Performed by: PHYSICIAN ASSISTANT

## 2017-10-02 PROCEDURE — 11100 HC BIOPSY SKIN/SUBQ/MUC MEM, EACH ADDTL LESION: CPT | Mod: 59 | Performed by: PHYSICIAN ASSISTANT

## 2017-10-02 PROCEDURE — 82043 UR ALBUMIN QUANTITATIVE: CPT | Performed by: PHYSICIAN ASSISTANT

## 2017-10-02 PROCEDURE — 11400 EXC TR-EXT B9+MARG 0.5 CM<: CPT | Performed by: PHYSICIAN ASSISTANT

## 2017-10-02 PROCEDURE — 80061 LIPID PANEL: CPT | Performed by: PHYSICIAN ASSISTANT

## 2017-10-02 PROCEDURE — 81003 URINALYSIS AUTO W/O SCOPE: CPT | Performed by: PHYSICIAN ASSISTANT

## 2017-10-02 NOTE — NURSING NOTE
"Chief Complaint   Patient presents with     Biopsy     skin, upper left back       Initial /73 (BP Location: Right arm, Patient Position: Chair, Cuff Size: Adult Regular)  Pulse 59  Temp 98.2  F (36.8  C) (Oral)  Wt 152 lb (68.9 kg)  SpO2 97%  BMI 24.53 kg/m2 Estimated body mass index is 24.53 kg/(m^2) as calculated from the following:    Height as of 9/29/17: 5' 6\" (1.676 m).    Weight as of this encounter: 152 lb (68.9 kg).  Medication Reconciliation: complete.Kiesha MITCHELL MA      "

## 2017-10-02 NOTE — PROGRESS NOTES
Shave Biopsy Procedure Note      Pre-operative Diagnosis: Suspicious lesion      Post-operative Diagnosis: same      Locations:left chest       Indications: suspicious lesion      Anesthesia: 1% lidocaine with epi      Procedure Details   History of allergy to iodine: no      Patient informed of the risks (including bleeding and infection) and benefits of the   procedure and printed informed consent obtained.      The lesion and surrounding area were given a sterile prep using betadine and draped in the usual sterile fashion. A scalpel was used to shave an area of skin approximately 1cm by 1cm.  Hemostasis achieved with silver nitrate. Antibiotic ointment and a sterile dressing applied. The specimen was sent for pathologic examination. The patient tolerated the procedure well.      EBL: <1 ml      Findings:  Suspicious lesion       Condition:  Stable      Complications:  none.      Plan:  1. Instructed to keep the wound dry and covered for 24-48h and clean thereafter.  2. Warning signs of infection were reviewed.    3. Recommended that the patient use OTC analgesics as needed for pain.   4. Return KASEY Drummond

## 2017-10-02 NOTE — MR AVS SNAPSHOT
"              After Visit Summary   10/2/2017    Harley Alvarado    MRN: [id number]           Patient Information     Date Of Birth          1955        Visit Information        Provider Department      10/2/2017 7:00 AM Cory Acosta PA-C [facility]        Today's Diagnoses     Membranous glomerulonephritis    -  1    Visit for preventive health examination        Dyslipidemia        Skin lesion           Follow-ups after your visit        Who to contact     If you have questions or need follow up information about today's clinic visit or your schedule please contact [facility] directly at [phone].  Normal or non-critical lab and imaging results will be communicated to you by [employer], letter or phone within 4 business days after the clinic has received the results. If you do not hear from us within 7 days, please contact the clinic through [employer] or phone. If you have a critical or abnormal lab result, we will notify you by phone as soon as possible.  Submit refill requests through [employer] or call your pharmacy and they will forward the refill request to us. Please allow 3 business days for your refill to be completed.          Additional Information About Your Visit        MyChart Information     [employer] lets you send messages to your doctor, view your test results, renew your prescriptions, schedule appointments and more. To sign up, go to [email or web address] . Click on \"Log in\" on the left side of the screen, which will take you to the Welcome page. Then click on \"Sign up Now\" on the right side of the page.     You will be asked to enter the access code listed below, as well as some personal information. Please follow the directions to create your username and password.     Your access code is: [email or web address]  Expires: 2017  2:04 PM     Your access code will  in 90 days. If you need help or a new code, please call your [facility] or " [phone].        Care EveryWhere ID     This is your Care EveryWhere ID. This could be used by other organizations to access your [place] medical records  [id number]        Your Vitals Were     Pulse Temperature Pulse Oximetry BMI (Body Mass Index)          59 98.2  F (36.8  C) (Oral) 97% 24.53 kg/m2         Blood Pressure from Last 3 Encounters:   10/02/17 113/73   09/29/17 126/86    Weight from Last 3 Encounters:   10/02/17 152 lb (68.9 kg)   09/29/17 153 lb 9.6 oz (69.7 kg)              We Performed the Following     *UA reflex to Microscopic and Culture ([place] [facility] (except [place] and [place])     Albumin Random Urine Quantitative with Creat Ratio     BIOPSY SKIN/SUBQ/MUC MEM, EACH ADDTL LESION     BIOPSY SKIN/SUBQ/MUC MEM, EACH ADDTL LESION     BIOPSY SKIN/SUBQ/MUC MEM, SINGLE LESION     Comprehensive metabolic panel (BMP + Alb, Alk Phos, ALT, AST, Total. Bili, TP)     Lipid panel reflex to direct LDL     Surgical pathology exam        Primary Care Provider Office Phone # Fax #    Cory Waldemar Acosta PA-C [phone]  [phone]        Equal Access to Services     TITUS LEE : Hadii aad ku hadasho Soomaali, waaxda luqadaha, qaybta kaalmada adeegyada, waxay joyce haystaceyn michael qaun. So [facility] [phone].    ATENCIÓN: Si habla español, tiene a dubon disposición servicios gratuitos de asistencia lingüística. Llame al [phone].    We comply with applicable federal civil rights laws and [place] laws. We do not discriminate on the basis of race, color, national origin, age, disability, sex, sexual orientation, or gender identity.            Thank you!     Thank you for choosing [facility]  for your care. Our goal is always to provide you with excellent care. Hearing back from our patients is one way we can continue to improve our services. Please take a few minutes to complete the written survey that you may  receive in the mail after your visit with us. Thank you!             Your Updated Medication List - Protect others around you: Learn how to safely use, store and throw away your medicines at www.disposemymeds.org.          This list is accurate as of: 10/2/17  8:10 AM.  Always use your most recent med list.                   Brand Name Dispense Instructions for use Diagnosis    lisinopril 20 MG tablet    PRINIVIL/ZESTRIL    90 tablet    Take 1 tablet (20 mg) by mouth daily    Membranous glomerulonephritis, Benign essential hypertension       olopatadine 0.1 % ophthalmic solution    PATANOL     INT 1 GTT IN OU BID        pravastatin 40 MG tablet    PRAVACHOL    90 tablet    TK 1 T PO QHS    Dyslipidemia       valACYclovir 500 MG tablet    VALTREX     TAKE ONE TABLET PO DAILY FOR HERPES/SHINGLES WHILE ON CYCLOSPORIN        warfarin 5 MG tablet    COUMADIN

## 2017-10-02 NOTE — PROGRESS NOTES
Punch Biopsy Procedure Note   Pre-operative Diagnosis: Suspicious lesion   Post-operative Diagnosis: same   Locations:left shoulder and left arm   Indications: suspicious lesion   Anesthesia: 1% lidocaine with epi  Procedure Details   History of allergy to iodine: no   Patient informed of the risks (including bleeding and infection) and benefits of the   procedure and printed informed consent obtained.   The lesions and surrounding areas was given a sterile prep using betadine and draped in the usual sterile fashion. The skin was then stretched perpendicular to the skin tension lines and the lesion removed using the 3mm punch. The resulting ellipse was then closed. The wound did not require closure. Antibiotic ointment and a sterile dressing applied. The specimens were sent for pathologic examination. The patient tolerated the procedure well.   EBL: 2 ml   Findings:   Suspicious lesion   Condition:   Stable   Complications:   none.   Plan:   1. Instructed to keep the wound dry and covered for 24-48h and clean thereafter.   2. Warning signs of infection were reviewed.   3. Recommended that the patient use OTC analgesics as needed for pain.   4. Return ayaan Acosta, PAC

## 2017-10-04 LAB — COPATH REPORT: NORMAL

## 2017-10-05 ENCOUNTER — TELEPHONE (OUTPATIENT)
Dept: FAMILY MEDICINE | Facility: CLINIC | Age: 62
End: 2017-10-05

## 2017-10-05 DIAGNOSIS — D23.9 DYSPLASTIC NEVI: Primary | ICD-10-CM

## 2017-10-05 NOTE — TELEPHONE ENCOUNTER
"Please call patient. Labs have all returned.     Kidney function is stable. Liver function, cholesterol, blood sugar, and electrolytes are all normal.     His biopsy results showed normal findings on his arm and chest lesions.     However, his left shoulder lesion showed \"dysplastic\" cells, which may result in cancer in the future. Because of this I would recommend complete excision through a skin specialist. I have placed a referral.    FMG: [facility] [place] [phone]   [email or web address]    -Chelle Hassan   "

## 2017-10-06 ENCOUNTER — TELEPHONE (OUTPATIENT)
Dept: FAMILY MEDICINE | Facility: CLINIC | Age: 62
End: 2017-10-06

## 2017-10-06 ENCOUNTER — OFFICE VISIT (OUTPATIENT)
Dept: FAMILY MEDICINE | Facility: CLINIC | Age: 62
End: 2017-10-06
Payer: COMMERCIAL

## 2017-10-06 VITALS
DIASTOLIC BLOOD PRESSURE: 78 MMHG | SYSTOLIC BLOOD PRESSURE: 114 MMHG | HEIGHT: 66 IN | TEMPERATURE: 97.4 F | WEIGHT: 154 LBS | BODY MASS INDEX: 24.75 KG/M2 | HEART RATE: 66 BPM

## 2017-10-06 DIAGNOSIS — Z79.01 CHRONIC ANTICOAGULATION: Primary | ICD-10-CM

## 2017-10-06 DIAGNOSIS — D23.9 DYSPLASTIC NEVUS: Primary | ICD-10-CM

## 2017-10-06 PROCEDURE — 97597 DBRDMT OPN WND 1ST 20 CM/<: CPT | Performed by: FAMILY MEDICINE

## 2017-10-06 RX ORDER — SULFAMETHOXAZOLE/TRIMETHOPRIM 800-160 MG
1 TABLET ORAL 2 TIMES DAILY
Qty: 20 TABLET | Refills: 0 | Status: SHIPPED | OUTPATIENT
Start: 2017-10-06 | End: 2018-03-13

## 2017-10-06 NOTE — PROGRESS NOTES
"  SUBJECTIVE:   Harley Alvarado is a 61 year old male who presents to clinic today for the following health issues:      Had punch biopsy on 10/2/17 to lesion left upper back, notes it has been draining yellow purulent material for the past 2 days. Not painful.     Was told it was a dysplastic nevus and needs Dermatology consultation for wide excision in the near future.       Problem list and histories reviewed & adjusted, as indicated.  Additional history: none    Patient Active Problem List   Diagnosis     Allergic conjunctivitis     Dyslipidemia     GERD (gastroesophageal reflux disease)     Latent tuberculosis     Major depression, recurrent (H)     Membranous glomerulonephritis     Nephropathy     Pulmonary embolism (H)     Benign essential hypertension     Dysplastic nevi     Past Surgical History:   Procedure Laterality Date     HERNIA REPAIR  2015       Social History   Substance Use Topics     Smoking status: Former Smoker     Smokeless tobacco: Never Used     Alcohol use No     History reviewed. No pertinent family history.          Reviewed and updated as needed this visit by clinical staff       Reviewed and updated as needed this visit by Provider         ROS:  Constitutional, HEENT, cardiovascular, pulmonary, gi and gu systems are negative, except as otherwise noted.      OBJECTIVE:   /78 (BP Location: Right arm, Patient Position: Sitting, Cuff Size: Adult Regular)  Pulse 66  Temp 97.4  F (36.3  C) (Oral)  Ht 5' 6\" (1.676 m)  Wt 154 lb (69.9 kg)  BMI 24.86 kg/m2  Body mass index is 24.86 kg/(m^2).  GENERAL: healthy, alert and no distress  SKIN: purulent material oozing from what looks like 3-4 mm punch biopsy site. No suture in place, surrounding erythema extending 0.5 cm all around. Well healing biopsy sites on left upper chest and left forearm    Diagnostic Test Results:  none     Procedure: debridement of surgical wound, flushed with NS, then small debridement of dead tissue within " punch biopsy, dressed with bacitracin and bandage, tolerated well    ASSESSMENT/PLAN:     1. Dysplastic nevus - suggested we treat surgical site infection first before re-excision  - DERMATOLOGY REFERRAL    2. Postoperative wound infection, initial encounter - Advised to keep covered 3 days with bacitracin and bandage, then leave open to air, will treat skin infection with PO abx.    - sulfamethoxazole-trimethoprim (BACTRIM DS/SEPTRA DS) 800-160 MG per tablet; Take 1 tablet by mouth 2 times daily  Dispense: 20 tablet; Refill: 0    Yohana Quevedo MD  [facility]

## 2017-10-06 NOTE — TELEPHONE ENCOUNTER
"Pt calls,    Wound \"has not closed up at all, wide open and deep and pussy.I am trying to keep it covered up.\"     Also reports [facility] referred to [facility] for further excision but no soon open appointments so did not schedule.  (we checked, next available with Dr. Corcoran 10/31/17)   Or is there another dermatologist he could see sooner?      Pt accepts appointment in  with MD 3:15 today for wound evaluation and management. Let's see if Dr. TOUSSAINT recommends earlier excision and referral to a different dermatologist.  Nirmal Duque, RN    "

## 2017-10-06 NOTE — TELEPHONE ENCOUNTER
Pt would liek to have INR followed by AV clinic but forgot to mention it at his OV.  Pt is scheduled on 10/10/17 but will need INR clinic referral.  Pt states he is on this for a PE with 2.0-3.0 range.    Please advise    Viky Sharma RN, BSN

## 2017-10-06 NOTE — NURSING NOTE
"Chief Complaint   Patient presents with     Derm Problem     check biopsy site for infection       Initial /78 (BP Location: Right arm, Patient Position: Sitting, Cuff Size: Adult Regular)  Pulse 66  Temp 97.4  F (36.3  C) (Oral)  Ht 5' 6\" (1.676 m)  Wt 154 lb (69.9 kg)  BMI 24.86 kg/m2 Estimated body mass index is 24.86 kg/(m^2) as calculated from the following:    Height as of this encounter: 5' 6\" (1.676 m).    Weight as of this encounter: 154 lb (69.9 kg).  Medication Reconciliation: complete     Health maintenance- pt has colonoscopy scheduled.    Adrian Farah CMA          "

## 2017-10-06 NOTE — MR AVS SNAPSHOT
After Visit Summary   10/6/2017    Harley Alvarado    MRN: [id number]           Patient Information     Date Of Birth          1955        Visit Information        Provider Department      10/6/2017 3:15 PM Yohana Quevedo MD [facility]        Today's Diagnoses     Dysplastic nevus    -  1    Postoperative wound infection, initial encounter          Care Instructions    Schedule with dermatology          Follow-ups after your visit        Additional Services     DERMATOLOGY REFERRAL       Your provider has referred you to: FMG: [facility] [phone]   [email or web address]    Please be aware that coverage of these services is subject to the terms and limitations of your health insurance plan.  Call member services at your health plan with any benefit or coverage questions.      Please bring the following with you to your appointment:    (1) Any X-Rays, CTs or MRIs which have been performed.  Contact the facility where they were done to arrange for  prior to your scheduled appointment.    (2) List of current medications  (3) This referral request   (4) Any documents/labs given to you for this referral                  Your next 10 appointments already scheduled     Dec 14, 2017   Procedure with Cirilo Alexander MD   [facility] ([facility])    [address] [place] [address] [phone]           [facility] is located at [address] [place]              Who to contact     If you have questions or need follow up information about today's clinic visit or your schedule please contact [facility] directly at [phone].  Normal or non-critical lab and imaging results will be communicated to you by MyChart, letter or phone within 4 business days after the clinic has received the results. If you do not hear from us within 7  "days, please contact the clinic through [employer] or phone. If you have a critical or abnormal lab result, we will notify you by phone as soon as possible.  Submit refill requests through [employer] or call your pharmacy and they will forward the refill request to us. Please allow 3 business days for your refill to be completed.          Additional Information About Your Visit        [employer] Information     [employer] lets you send messages to your doctor, view your test results, renew your prescriptions, schedule appointments and more. To sign up, go to [email or web address] . Click on \"Log in\" on the left side of the screen, which will take you to the Welcome page. Then click on \"Sign up Now\" on the right side of the page.     You will be asked to enter the access code listed below, as well as some personal information. Please follow the directions to create your username and password.     Your access code is: [email or web address]  Expires: 2017  2:04 PM     Your access code will  in 90 days. If you need help or a new code, please call your [place] clinic or [phone].        Care EveryWhere ID     This is your Care EveryWhere ID. This could be used by other organizations to access your [place] medical records  [id number]        Your Vitals Were     Pulse Temperature Height BMI (Body Mass Index)          66 97.4  F (36.3  C) (Oral) 5' 6\" (1.676 m) 24.86 kg/m2         Blood Pressure from Last 3 Encounters:   10/06/17 114/78   10/02/17 113/73   17 126/86    Weight from Last 3 Encounters:   10/06/17 154 lb (69.9 kg)   10/02/17 152 lb (68.9 kg)   17 153 lb 9.6 oz (69.7 kg)              We Performed the Following     DERMATOLOGY REFERRAL          Today's Medication Changes          These changes are accurate as of: 10/6/17  3:56 PM.  If you have any questions, ask your nurse or doctor.               Start taking these medicines.        Dose/Directions    sulfamethoxazole-trimethoprim 800-160 MG per " tablet   Commonly known as:  BACTRIM DS/SEPTRA DS   Used for:  Postoperative wound infection, initial encounter   Started by:  Yohana Quevedo MD        Dose:  1 tablet   Take 1 tablet by mouth 2 times daily   Quantity:  20 tablet   Refills:  0            Where to get your medicines      These medications were sent to [id number] - [facility] [id number] [place] [address] AT [place] [address]  [id number] [place] [id number]    Hours:  24-hours Phone:  [phone]     sulfamethoxazole-trimethoprim 800-160 MG per tablet                Primary Care Provider Office Phone # Fax #    Cory Waldemar Acosta PA-C [phone] [address] [facility] [address]        Equal Access to Services     TITUS LEE : Hadii arlette art hadasho Soomaali, waaxda luqadaha, qaybta kaalmada adeegyada, essie larkin . So [facility] [phone].    ATENCIÓN: Si habla español, tiene a dubon disposición servicios gratuitos de asistencia lingüística. LlKindred Hospital [facility] [phone].    We comply with applicable federal civil rights laws and [place] laws. We do not discriminate on the basis of race, color, national origin, age, disability, sex, sexual orientation, or gender identity.            Thank you!     Thank you for choosing [facility]  for your care. Our goal is always to provide you with excellent care. Hearing back from our patients is one way we can continue to improve our services. Please take a few minutes to complete the written survey that you may receive in the mail after your visit with us. Thank you!             Your Updated Medication List - Protect others around you: Learn how to safely use, store and throw away your medicines at www.disposemymeds.org.          This list is accurate as of: 10/6/17  3:56 PM.  Always use your most recent med list.                   Brand Name Dispense Instructions for use Diagnosis    lisinopril 20 MG tablet     PRINIVIL/ZESTRIL    90 tablet    Take 1 tablet (20 mg) by mouth daily    Membranous glomerulonephritis, Benign essential hypertension       olopatadine 0.1 % ophthalmic solution    PATANOL     INT 1 GTT IN OU BID        pravastatin 40 MG tablet    PRAVACHOL    90 tablet    TK 1 T PO QHS    Dyslipidemia       sulfamethoxazole-trimethoprim 800-160 MG per tablet    BACTRIM DS/SEPTRA DS    20 tablet    Take 1 tablet by mouth 2 times daily    Postoperative wound infection, initial encounter       valACYclovir 500 MG tablet    VALTREX     TAKE ONE TABLET PO DAILY FOR HERPES/SHINGLES WHILE ON CYCLOSPORIN        warfarin 5 MG tablet    COUMADIN

## 2017-10-06 NOTE — TELEPHONE ENCOUNTER
"Patient referred by Dr. FABY Acosta for a wide excision. Result notes : \"However, his left shoulder lesion showed \"dysplastic\" cells, which may result in cancer in the future. Because of this I would recommend complete excision through a skin specialist. I have placed a referral.\" Please advise re: scheduling.  [phone] (home)   Thank you  An Le  "

## 2017-10-10 ENCOUNTER — ANTICOAGULATION THERAPY VISIT (OUTPATIENT)
Dept: NURSING | Facility: CLINIC | Age: 62
End: 2017-10-10
Payer: COMMERCIAL

## 2017-10-10 DIAGNOSIS — Z79.01 LONG-TERM (CURRENT) USE OF ANTICOAGULANTS: ICD-10-CM

## 2017-10-10 DIAGNOSIS — I26.99 PULMONARY EMBOLISM (H): ICD-10-CM

## 2017-10-10 LAB — INR POINT OF CARE: 2.1 (ref 0.86–1.14)

## 2017-10-10 PROCEDURE — 99207 ZZC NO CHARGE NURSE ONLY: CPT

## 2017-10-10 PROCEDURE — 36416 COLLJ CAPILLARY BLOOD SPEC: CPT

## 2017-10-10 PROCEDURE — 85610 PROTHROMBIN TIME: CPT | Mod: QW

## 2017-10-10 NOTE — MR AVS SNAPSHOT
Harley Alvarado   10/10/2017 2:30 PM   Anticoagulation Therapy Visit    Description:  61 year old male   Provider:  [facility]   Department:  [facility] Nurse           INR as of 10/10/2017     Today's INR 2.1      Anticoagulation Summary as of 10/10/2017     INR goal 2.0-3.0   Today's INR 2.1   Full instructions 5 mg on Sun; 7.5 mg all other days   Next INR check 11/7/2017    Indications   Long-term (current) use of anticoagulants [Z79.01] [Z79.01]  Pulmonary embolism (H) [I26.99]         Your next Anticoagulation Clinic appointment(s)     Nov 07, 2017 11:45 AM CST   Anticoagulation Visit with [facility]   [facility] ([facility]    [address] [place] [address] [phone]              Contact Numbers     Clinic Number:         October 2017 Details    Sun Mon Tue Wed Thu Fri Sat     1               2               3               4               5               6               7                 8               9               10      7.5 mg   See details      11      7.5 mg         12      7.5 mg         13      7.5 mg         14      7.5 mg           15      5 mg         16      7.5 mg         17      7.5 mg         18      7.5 mg         19      7.5 mg         20      7.5 mg         21      7.5 mg           22      5 mg         23      7.5 mg         24      7.5 mg         25      7.5 mg         26      7.5 mg         27      7.5 mg         28      7.5 mg           29      5 mg         30      7.5 mg         31      7.5 mg              Date Details   10/10 This INR check               How to take your warfarin dose     To take:  5 mg Take 1 of the 5 mg tablets.    To take:  7.5 mg Take 1.5 of the 5 mg tablets.           November 2017 Details    Sun Mon Tue Wed Thu Fri Sat        1      7.5 mg         2      7.5 mg         3      7.5 mg         4      7.5 mg           5      5 mg         6      7.5 mg         7             8               9               10               11                 12               13               14               15               16               17               18                 19               20               21               22               23               24               25                 26               27               28               29               30                  Date Details   No additional details    Date of next INR:  11/7/2017         How to take your warfarin dose     To take:  5 mg Take 1 of the 5 mg tablets.    To take:  7.5 mg Take 1.5 of the 5 mg tablets.

## 2017-10-10 NOTE — PROGRESS NOTES
"  ANTICOAGULATION FOLLOW-UP CLINIC VISIT    Patient Name:  Harley Alvarado  Date:  10/10/2017  Contact Type:  Face to Face    SUBJECTIVE:     Patient Findings     Positives Change in diet/appetite (Pt states he often eats \"large\" salads.  I encouraged pt to eat dinner size salads and/or 1 cup serving size and to practice consistency with servings per week.), Antibiotic use or infection (Started Bactrim DS on 10/06 x 10 days for wound infection)    Comments Patient has been on Warfarin for \"years\" for PE, he has been going to a [facility].  Patient states he is to take Warfarin long-term due to kidney condition.           OBJECTIVE    INR Protime   Date Value Ref Range Status   10/10/2017 2.1 (A) 0.86 - 1.14 Final       ASSESSMENT / PLAN  INR assessment THER    Recheck INR In: 4 WEEKS    INR Location Clinic      Anticoagulation Summary as of 10/10/2017     INR goal 2.0-3.0   Today's INR 2.1   Maintenance plan 5 mg (5 mg x 1) on Sun; 7.5 mg (5 mg x 1.5) all other days   Full instructions 5 mg on Sun; 7.5 mg all other days   Weekly total 50 mg   Plan last modified Azul Garber RN (10/10/2017)   Next INR check 11/7/2017   Target end date Indefinite    Indications   Long-term (current) use of anticoagulants [Z79.01] [Z79.01]  Pulmonary embolism (H) [I26.99]         Anticoagulation Episode Summary     INR check location     Preferred lab     Send INR reminders to [facility]    Comments       Anticoagulation Care Providers     Provider Role Specialty Phone number    Cory Acosta PA-C Responsible Physician Assistant [phone]            See the Encounter Report to view Anticoagulation Flowsheet and Dosing Calendar (Go to Encounters tab in chart review, and find the Anticoagulation Therapy Visit)        Azul Garber RN               "

## 2017-10-24 ENCOUNTER — OFFICE VISIT (OUTPATIENT)
Dept: SLEEP MEDICINE | Facility: CLINIC | Age: 62
End: 2017-10-24
Payer: COMMERCIAL

## 2017-10-24 VITALS
HEIGHT: 66 IN | DIASTOLIC BLOOD PRESSURE: 70 MMHG | OXYGEN SATURATION: 97 % | RESPIRATION RATE: 15 BRPM | BODY MASS INDEX: 24.75 KG/M2 | WEIGHT: 154 LBS | SYSTOLIC BLOOD PRESSURE: 106 MMHG | HEART RATE: 66 BPM

## 2017-10-24 DIAGNOSIS — Z72.821 POOR SLEEP HYGIENE: ICD-10-CM

## 2017-10-24 DIAGNOSIS — G47.9 SLEEP DISTURBANCE: Primary | ICD-10-CM

## 2017-10-24 DIAGNOSIS — R06.83 SNORING: ICD-10-CM

## 2017-10-24 PROCEDURE — 99244 OFF/OP CNSLTJ NEW/EST MOD 40: CPT | Performed by: INTERNAL MEDICINE

## 2017-10-24 NOTE — PROGRESS NOTES
[facility]  Outpatient Sleep Medicine Consultation  October 24, 2017      Name: Harley Alvarado MRN# [id number]   Age: 61 year old YOB: 1955     Date of Consultation: October 24, 2017  Consultation is requested by: Cory Acosta PA-C  [address] [place] [address]  Primary care provider: Cory Acosta  Home clinic: [facility]       Reason for Sleep Consult:     Harley Alvarado is a 61 year old male nightly witnessed apnea, snoring, poor quality of sleep and excessive daytime sleepiness and tiredness         Assessment and Plan:     Summary Sleep Diagnoses/Recommendations:    1. Sleep Disturbance:  High suspicion of sleep disordered breathing based on patient's symptoms (snoring, excessive daytime sleepiness, witnessed apneas), high BMI, neck circumference and oropharyngeal examination. Will schedule Home sleep study vs PSG with PAP titration in second half if patient meets criteria for GONZALO in first half of PSG. We also discussed the pathophysiology of sleep disordered breathing and the importance of treating it if S/he should have it. Patient is advised not to drive if he/she feels drowsy or sleepy. Maintenance insomnia is due to untreated SDB and poor sleep hygiene.  Follow up after sleep study to discuss the result of sleep study and treatment options.    4.  Poor sleep hygiene and insomnia:   - We instructed patient to develop regular sleep-wake schedule and regular sleep habits including regular bedtime and wake time to strengthen circadian rhythm, to sleep as much as needed to feel refreshed, not to spend more time in bed than needed. Bedroom should be dark, cool and quiet.  - We also asked patient to avoid napping during the day, forcing yourself to sleep, taking problems to bed, strenuous activity just before bedtime, use of caffeine, alcohol or tobacco just before bedtime, reading, eating or watching TV in bed.  Good sleep hygiene  and sleep-wake instructions were given.      Orders Placed This Encounter   Procedures     HST-Home Sleep Apnea Test       Summary Counseling:  See instructions    Counseling included a comprehensive review of diagnostic and therapeutic strategies as well as risks of inadequate therapy.  Educational materials provided in instructions.    All questions were answered.  The patient indicates understanding of the above issues and agrees with the plan set forth.           History of Present Illness:     Harley Alvarado is a 61 year old male with history of hypertension, hyperlipidemia, depression, GERD, pulmonary embolism, membranous glomerulonephritis and allergic rhinitis who presents to the [facility] in [place] with complains of sleep disturbance. I was asked to see Mr. Alvarado regarding snoring by Cory Acosta PA-C.   Patient complains snoring, witnessed apnea (as per wife), excessive daytime sleepiness and tiredness, difficulty falling and staying asleep. He denies waking up gasping air, morning headache. He denies restless legs.    Please see below for sleep ROS details.    PREVIOUS IN- LAB or HOME SLEEP STUDIES:  None     SLEEP-WAKE SCHEDULE:     Harley Alvarado     -Describes themself as neither a morning or night person;      -ON WEEKDAYS, goes to sleep at 10:00 PM during the week; awakens  6:30 AM with an alarm; falls asleep in 30-60 minutes; has difficulty falling asleep.     -ON WEEKENDS, goes to sleep at 10-12:00 AM and wakes up at 7:00 AM without an alarm; falls asleep in 30 minutes.       -Awakens 1-2 times a night for 5-60 minutes before falling back to sleep; awakens to go to the bathroom and uncertain reasons.      -Total sleep time: 4-6 hours per night.    -Naps 7 times/days per week for 30 minutes, feels refreshed after naps; takes no inadvertant naps.       BEDTIME ACTIVITIES AND SHIFT WORK:    Harley Alvarado    -does use electronics in bed and read in bed and does not  watch TV in bed.     -does not do shift work.  He/she works day shifts.      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     SCALES       SLEEP APNEA: Stopbang score: 6       INSOMNIA:  Insomnia severity score: N/A       SLEEPINESS: Kinta sleepiness scale (ESS):  7   Drowsy driving/near accidents: No          PHQ9: N/A    SLEEP COMPLAINTS:   Snoring- 7 days/week  Witness apnea: Yes  Gasping/Choking: No  Excessive daytime sleep: Yes  Toss/turn: Yes  Excessive tiredness/fatigue:  Yes  Morning headaches: No  Dry mouth/throat: Yes  Dyspnea: No  Coexisting Lung disease: No    Coexisting Heart disease: No    Does patient have a bed partner: Yes  Has bed partner been sleeping separately because of snoring:  No            RLS Screen: When you try to relax in the evening or sleep at  night, do you ever have unpleasant, restless feelings in your  legs that can be relieved by walking or movement? No    Periodic limb movement: No    Narcolepsy:       denies sudden urges of sleep attacks     denies cataplexy     denies sleep paralysis      denies hallucinations     Sleep Behaviors:     denies leg symptoms/movements     denies motor restlessness     denies night terrors     denies bruxism     denies automatic behaviors    Other subjective complaints:     denies anxiety or rumination      denies pain and discomfort at  night     denies waking up with heart pounding or racing     denies GERD/heartburn         Parasomnia:   NREM - denies recurrent persistent confusional arousal, night eating, sleep walking or sleep terrors   REM  - denies dream enactment; injuries     Safety: None             Medications:     Current Outpatient Prescriptions   Medication Sig     sulfamethoxazole-trimethoprim (BACTRIM DS/SEPTRA DS) 800-160 MG per tablet Take 1 tablet by mouth 2 times daily     olopatadine (PATANOL) 0.1 % ophthalmic solution INT 1 GTT IN OU BID     warfarin (COUMADIN) 5 MG tablet      valACYclovir (VALTREX) 500 MG tablet TAKE ONE TABLET PO DAILY FOR  HERPES/SHINGLES WHILE ON CYCLOSPORIN     pravastatin (PRAVACHOL) 40 MG tablet TK 1 T PO QHS     lisinopril (PRINIVIL/ZESTRIL) 20 MG tablet Take 1 tablet (20 mg) by mouth daily     No current facility-administered medications for this visit.         Medication that can affect sleep: none     No Known Allergies         Past Medical History:     Does not need 02 supplement at night     Past Medical History:   Diagnosis Date     Allergic conjunctivitis 4/13/2015     Benign essential hypertension 9/29/2017     Dyslipidemia 9/29/2017    Overview:  Created by Conversion     GERD (gastroesophageal reflux disease) 2/6/2016     Latent tuberculosis 9/29/2017    Overview:  Created by Conversion     Major depression, recurrent (H) 9/29/2017    Overview:  Created by Conversion  Replacement Utility updated for latest IMO load     Membranous glomerulonephritis 9/29/2017    Overview:  Created by Conversion [facility]: Aug 26 2011  5:18PM - Angely Pat: Stage 1, s/p biopsy  8/2011  Replacement Utility updated for latest IMO load     Nephropathy 12/30/2014     Pulmonary embolism (H) 12/30/2014               Past Surgical History:    Yes previous upper airway surgery: tonsillectomy    Past Surgical History:   Procedure Laterality Date     HERNIA REPAIR  2015            Social History:     Social History   Substance Use Topics     Smoking status: Former Smoker     Smokeless tobacco: Never Used     Alcohol use No         Chemical History:     Tobacco: No     Uses 3 cups/day of coffee. Last caffeine intake is usually before 8 am    EtOH: No   Recreational Drugs: No    Psych Hx:   Depression     Current dangers to self or others: None           Family History:     No family history on file.     Sleep Family Hx:        RLS- No  GONZALO - No  Insomnia - No  Parasomnia - No         Review of Systems:     A complete 10 point review of systems was negative other than HPI or as commented below:   Patient denies chest pain, dyspnea with  "activity and or rest, wheezing, abdominal pain, n&v, fever, chills, dysuria, leg pain or swelling. Patient is also denies ear pain, sore throat, postnasal drip, running nose, dry cough.      Harley Alvarado has gained 0-5 pounds in the last year.            Physical Examination:   /70  Pulse 66  Resp 15  Ht 1.676 m (5' 6\")  Wt 69.9 kg (154 lb)  SpO2 97%  BMI 24.86 kg/m2     Neck Circumference: 38 cm   Constitutional: . Awake, alert, cooperative, in no apparent distress  Mood: euthymic; affect congruent with full range and intensity.  Attention/Concentration:  Normal   Eyes: Pupils round and reactive. No icterus.  ENT: Mallampati Class: IV.   Tonsillar Stage: 0  surgically removed  Clear nasal passages. Enlarged inferior turbinates. No deviated septum.  Oropharynx: No high arched palate. No pharyngeal erythema or exudates, elongated uvula. No lateral narrowing  Tongue: No relative macroglossia   Dentition: Good. overjet  Dentures: None  Neck: Supple, no thyroid enlargement.   Cardiovascular: Regular S1 and S2, no gallops or murmurs.   Pulmonary:  Chest symmetric, lungs clear bilaterally and no crackles, wheezes or rales.  Abdomen: Soft, obese, non tender.  Extremities:  No pedal edema.  Muscle/joint: Strength and tone normal   Skin:  No rash or significant lesions.   Neurologic: Alert, oriented x3, no focal neurological deficit.           Data: All pertinent previous laboratory data reviewed     No results found for: PH, PHARTERIAL, PO2, CY1YKGPAHXC, SAT, PCO2, HCO3, BASEEXCESS, RAMA, BEB  No results found for: TSH  Lab Results   Component Value Date     (H) 10/02/2017     No results found for: HGB  Lab Results   Component Value Date    BUN 26 10/02/2017    CR 1.38 (H) 10/02/2017    CR 1.4 (A) 09/12/2017     Lab Results   Component Value Date    CO2 28 10/02/2017     No results found for: FRANNY      Echocardiography: No    Chest x-ray: No    PFT: No        Copy to: Cory Acosta  Copy to: " Cory Castañeda MD 10/24/2017   [facility]  [address] [facility], [place] [address]   [phone] Clinic    Total time spent with patient: 42 minutes with this patient today in which 25 minutes was spent in counseling/coordination of care and going over planned testing and recommendations.

## 2017-10-24 NOTE — NURSING NOTE
"Chief Complaint   Patient presents with     Consult     Snores per wife, stops breathing, Hard time falling and staying asleep, No SS or cpap       Initial /70  Pulse 66  Resp 15  Ht 1.676 m (5' 6\")  Wt 69.9 kg (154 lb)  SpO2 97%  BMI 24.86 kg/m2 Estimated body mass index is 24.86 kg/(m^2) as calculated from the following:    Height as of this encounter: 1.676 m (5' 6\").    Weight as of this encounter: 69.9 kg (154 lb).  Medication Reconciliation: complete       Neck 38cm  15in  Ess 7      Azul Son LPN/JOSELO  "

## 2017-10-24 NOTE — MR AVS SNAPSHOT
"              After Visit Summary   10/24/2017    Harley Alvarado    MRN: [id number]           Patient Information     Date Of Birth          1955        Visit Information        Provider Department      10/24/2017 3:00 PM Lyndon Castañeda MD [facility] [place]        Today's Diagnoses     Sleep disturbance    -  1    Snoring        Poor sleep hygiene          Care Instructions        MY TREATMENT INFORMATION FOR SLEEP DISTURBANCE-  [place]    DOCTOR : Lyndon Castañeda  SLEEP CENTER :  [place]  MY CONTACT NUMBER:[phone]        If I haven't had a sleep study yet, what can I expect?  A personal story from [employer]  https://www.PAIEON.com/watch?v=AxPLmlRpnCs    Am I having a home sleep study?  Here is a video in case you get home and want to make sure you have done it correctly  https://www.PAIEON.com/watch?v=PRX9I5dTym0&feature=youtu.be    Suspected sleep apnea: Sleep study ordered.    Follow up in sleep clinic 1-2 weeks after sleep study to discuss results of sleep study and treatment options.    Patient was advised not to drive if drowsy or sleepy.    Frequently asked questions:  1. What is Obstructive Sleep Apnea (GONZALO)? GONZALO is the most common type of sleep apnea. Apnea literally means, \"without breath.\" It is characterized by repetitive pauses in breathing, despite continued effort to breathe, and is usually associated with a reduction in blood oxygen saturation. Apneas can last 10 to over 60 seconds. It is caused by narrowing or collapse of the upper airway as muscles relax during sleep. Severity of sleep apnea is determined by frequency of breathing events and their effect on your sleep and oxygen levels determined during sleep testing.   2. What are the consequences of GONZALO? Symptoms include: daytime sleepiness- possibly increasing the risk of falling asleep while driving, unrefreshing/restless sleep, snoring, insomnia, waking frequently to urinate, waking with heartburn " or reflux, reduced concentration and memory, and morning headaches. Other health consequences may include development of high blood pressure and other cardiovascular disease in persons who are susceptible. Untreated GONZALO  can contribute to heart disease, stroke and diabetes.   3. What are the treatment options? In most situations, sleep apnea is a lifelong disease that must be managed with daily therapy. Medications are not effective for sleep apnea and surgery is generally not performed until other therapies have been tried. Therapy is usually tailored to the individual patient based on many factors including your wishes as well as severity of sleep apnea and severity of obesity. Continuous Positive Airway (CPAP) is the most reliable treatment. An oral device to hold your jaw forward is usually the next most reliable option. Other options include postioning devices (to keep you off your back), weight loss, and surgery including a tongue pacing device. There is more detail about some of these options below.            1. CPAP-  WHAT DOES IT DO AND HOW CAN I LEARN TO WEAR IT?                               BEFORE I START, CAN I WATCH A MOVIE TO GET A PLAN ON HOW TO USE CPAP?  https://www.[employer].com/watch?j=k9C64zu632X      Continuous positive airway pressure, or CPAP, is the most effective treatment for obstructive sleep apnea. It works by blowing room air, through a mask, to hold your throat open. A decision to use CPAP is a major step forward in the pursuit of a healthier life. The successful use of CPAP will help you breathe easier, sleep better and live healthier. You can choose CPAP equipment from any durable medical equipment provider that meets your needs.  Using CPAP can be a positive experience if you keep these gong points in mind:  1. Commitment  CPAP is not a quick fix for your problem. It involves a long-term commitment to improve your sleep and your health.    2. Communication  Stay in close communication  "with both your sleep doctor and your CPAP supplier. Ask lots of questions and seek help when you need it.    3. Consistency  Use CPAP all night, every night and for every nap. You will receive the maximum health benefits from CPAP when you use it every time that you sleep. This will also make it easier for your body to adjust to the treatment.    4. Correction  The first machine and mask that you try may not be the best ones for you. Work with your sleep doctor and your CPAP supplier to make corrections to your equipment selection. Ask about trying a different type of machine or mask if you have ongoing problems. Make sure that your mask is a good fit and learn to use your equipment properly.    5. Challenge  Tell a family member or close friend to ask you each morning if you used your CPAP the previous night. Have someone to challenge you to give it your best effort.    6. Connection   Your adjustment to CPAP will be easier if you are able to connect with others who use the same treatment. Ask your sleep doctor if there is a support group in your area for people who have sleep apnea, or look for one on the Internet.  7. Comfort   Increase your level of comfort by using a saline spray, decongestant or heated humidifier if CPAP irritates your nose, mouth or throat. Use your unit's \"ramp\" setting to slowly get used to the air pressure level. There may be soft pads you can buy that will fit over your mask straps. Look on www.CPAP.com for accessories that can help make CPAP use more comfortable.  8. Cleaning   Clean your mask, tubing and headgear on a regular basis. Put this time in your schedule so that you don't forget to do it. Check and replace the filters for your CPAP unit and humidifier.    9. Completion   Although you are never finished with CPAP therapy, you should reward yourself by celebrating the completion of your first month of treatment. Expect this first month to be your hardest period of adjustment. It " will involve some trial and error as you find the machine, mask and pressure settings that are right for you.    10. Continuation  After your first month of treatment, continue to make a daily commitment to use your CPAP all night, every night and for every nap.    CPAP-Tips to starting with success:  Begin using your CPAP for short periods of time during the day while you watch TV or read.    Use CPAP every night and for every nap. Using it less often reduces the health benefits and makes it harder for your body to get used to it.    Make small adjustments to your mask, tubing, straps and headgear until you get the right fit. Tightening the mask may actually worsen the leak.  If it leaves significant marks on your face or irritates the bridge of your nose, it may not be the best mask for you.  Speak with the person who supplied the mask and consider trying other masks. Insurances will allow you to try different masks during the first month of starting CPAP.  Insurance also covers a new mask, hose and filter about every 6 months.    Use a saline nasal spray to ease mild nasal congestion. Neti-Pot or saline nasal rinses may also help. Nasal gel sprays can help reduce nasal dryness.  Biotene mouthwash can be helpful to protect your teeth if you experience frequent dry mouth.  Dry mouth may be a sign of air escaping out of your mouth or out of the mask in the case of a full face mask.  Speak with your provider if you expect that is the case.     Take a nasal decongestant to relieve more severe nasal or sinus congestion.  Do not use Afrin (oxymetazoline) nasal spray more than 3 days in a row.  Speak with your sleep doctor if your nasal congestion is chronic.    Use a heated humidifier that fits your CPAP model to enhance your breathing comfort. Adjust the heat setting up if you get a dry nose or throat, down if you get condensation in the hose or mask.  Position the CPAP lower than you so that any condensation in the  hose drains back into the machine rather than towards the mask.    Try a system that uses nasal pillows if traditional masks give you problems.    Clean your mask, tubing and headgear once a week. Make sure the equipment dries fully.    Regularly check and replace the filters for your CPAP unit and humidifier.    Work closely with your sleep provider and your CPAP supplier to make sure that you have the machine, mask and air pressure setting that works best for you. It is better to stop using it and call your provider to solve problems than to lay awake all night frustrated with the device.    BESIDES CPAP, WHAT OTHER THERAPIES ARE THERE?      Positioning Device  Positioning devices are generally used when sleep apnea is mild and only occurs on your back.This example shows a pillow that straps around the waist. It may be appropriate for those whose sleep study shows milder sleep apnea that occurs primarily when lying flat on one's back. Preliminary studies have shown benefit but effectiveness at home may need to be verified by a home sleep test. These devices are generally not covered by medical insurance.                      Oral Appliance  What is oral appliance therapy?  An oral appliance is a small acrylic device that fits over the upper and lower teeth or tongue (similar to an orthodontic retainer or a mouth guard). This device slightly advances the lower jaw or tongue, which moves the base of the tongue forward, opens the airway, improves breathing and can effectively treat snoring and obstructive sleep apnea sleep apnea. The appliance is fabricated and customized by a qualified dentist with experience in treating snoring and sleep apnea. Oral appliances are usually well tolerated and have relatively high compliance by patients1, 2, 3.  When is an oral appliance indicated?  Oral appliance therapy is recommended as a first-line treatment for patients with primary snoring, mild sleep apnea, and for patients  with moderate sleep apnea who prefer appliance therapy to use of CPAP4, 5. Severity of sleep apnea is determined by sleep testing and is based on the number of respiratory events per hour of sleep.   How successful is oral appliance therapy?  The success rate of oral appliance therapy in patients with mild sleep apnea is 75-80% while in patients with moderate sleep apnea it is 50-70%. The chance of success in patients with severe sleep apnea is 40-50%. The research also shows that oral appliances have a beneficial effect on the cardiovascular health of GONZALO patients at the same magnitude as CPAP therapy7.  Oral appliances should be a second-line treatment in cases of severe sleep apnea, but if not completely successful then a combination therapy utilizing CPAP plus oral appliance therapy may be effective. Oral appliances tend to be effective in a broad range of patients although studies show that the patients who have the highest success are females, younger patients, those with milder disease, and less severe obesity. 3, 6.   The chances of success are lower in patients who have more severe GONZALO, are older, and those who are morbidly obese.     Example of an oral appliance   Finding a dentist that practices dental sleep medicine  Specific training is available through the American Academy of Dental Sleep Medicine for dentists interested in working in the field of sleep. To find a dentist who is educated in the field of sleep and the use of oral appliances, near you, visit the Web site of the American Academy of Dental Sleep Medicine; also see   http://www.accpstorage.org/newOrganization/patients/oralAppliances.pdf  To search for a dentist certified in these practices:  Http://aadsm.org/FindADentist.aspx?1  1. Issa et al. Objectively measured vs self-reported compliance during oral appliance therapy for sleep-disordered breathing. Chest 2013; 144(5): 7509-5841.  2. Mika et al. Objective measurement of  compliance during oral appliance therapy for sleep-disordered breathing. Thorax 2013; 68(1): 91-96.  3. Jatin, et al. Mandibular advancement devices in 620 men and women with GONZALO and snoring: tolerability and predictors of treatment success. Chest 2004; 125: 6674-0309.  4. Allan et al. Oral appliances for snoring and GONZALO: a review. Sleep 2006; 29: 244-262.  5. Denisa et al. Oral appliance treatment for GONZALO: an update. J Clin Sleep Med 2014; 10(2): 215-227.  6. Jessie et al. Predictors of OSAH treatment outcome. J Dent Res 2007; 86: 4746-2605.    Nasal Valves                 Nasal valves may not be effective if you have frequent nasal congestion or have difficulty breathing through your nose. They may be an option for mild apnea if other options are not well tolerated. The efficacy of these devices is generally less than CPAP or oral appliances.      Weight Loss:    Weight management is a personal decision.  If you are interested in exploring weight loss strategies, the following discussion covers the impact on weight loss on sleep apnea and the approaches that may be successful.    Weight loss decreases severity of sleep apnea in most people with obesity. For those with mild obesity who have developed snoring with weight gain, even 15-30 pound weight loss can improve and occasionally eliminate sleep apnea.  Structured and life-long dietary and health habits are necessary to lose weight and keep healthier weight levels.     Though there may be significant health benefits from weight loss, long-term weight loss is very difficult to achieve- studies show success with dietary management in less than 10% of people. In addition, substantial weight loss may require years of dietary control and may be difficult if patients have severe obesity. In these cases, surgical management may be considered.  Finally, older individuals who have tolerated obesity without health complications may be less likely to benefit  from weight loss strategies.    Your BMI is Body mass index is 24.86 kg/(m^2).  Body mass index (BMI) is one way to tell whether you are at a healthy weight, overweight, or obese. It measures your weight in relation to your height.  A BMI of 18.5 to 24.9 is in the healthy range. A person with a BMI of 25 to 29.9 is considered overweight, and someone with a BMI of 30 or greater is considered obese. More than two-thirds of American adults are considered overweight or obese.  Being overweight or obese increases the risk for further weight gain. Excess weight may lead to heart disease and diabetes.  Creating and following plans for healthy eating and physical activity may help you improve your health.  Weight control is part of healthy lifestyle and includes exercise, emotional health, and healthy eating habits. Careful eating habits lifelong are the mainstay of weight control. Though there are significant health benefits from weight loss, long-term weight loss with diet alone may be very difficult to achieve- studies show long-term success with dietary management in less than 10% of people. Attaining a healthy weight may be especially difficult to achieve in those with severe obesity. In some cases, medications, devices and surgical management might be considered.  What can you do?  If you are overweight or obese and are interested in methods for weight loss, you should discuss this with your provider.     Consider reducing daily calorie intake by 500 calories.     Keep a food journal.     Avoiding skipping meals, consider cutting portions instead.    Diet combined with exercise helps maintain muscle while optimizing fat loss. Strength training is particularly important for building and maintaining muscle mass. Exercise helps reduce stress, increase energy, and improves fitness. Increasing exercise without diet control, however, may not burn enough calories to loose weight.       Start walking three days a week 10-20  minutes at a time    Work towards walking thirty minutes five days a week     Eventually, increase the speed of your walking for 1-2 minutes at time    In addition, we recommend that you review healthy lifestyles and methods for weight loss available through the National Institutes of Health patient information sites:  http://win.niddk.nih.gov/publications/index.htm    And look into health and wellness programs that may be available through your health insurance provider, employer, local community center, or dominik club.        Surgery:    Upper Airway Surgery for GONZALO  Surgery for GONZALO is a second-line treatment option in the management of sleep apnea.  Surgery should be considered for patients who are having a difficult time tolerating CPAP.    Surgery for GONZALO is directed at areas that are responsible for narrowing or complete obstruction of the airway during sleep.  There are a wide range of procedures available to enlarge and/or stabilize the airway to prevent blockage of breathing in the three major areas where it can occur: the palate, tongue, and nasal regions.  Successful surgical treatment depends on the accurate identification of the factors responsible for obstructive sleep apnea in each person.  A personalized approach is required because there is no single treatment that works well for everyone.  Because of anatomic variation, consultation with an examination by a sleep surgeon is a critical first step in determining what surgical options are best for each patient.  In some cases, examination during sedation may be recommended in order to guide the selection of procedures.  Patients will be counseled about risks and benefits as well as the typical recovery course after surgery. Surgery is typically not a cure for a person s GONZALO.  However, surgery will often significantly improve one s GONZALO severity (termed  success rate ).  Even in the absence of a cure, surgery will decrease the cardiovascular risk  associated with OSA7; improve overall quality of life8 (sleepiness, functionality, sleep quality, etc).          Palate Procedures:  Patients with GONZALO often have narrowing of their airway in the region of their tonsils and uvula.  The goals of palate procedures are to widen the airway in this region as well as to help the tissues resist collapse.  Modern palate procedure techniques focus on tissue conservation and soft tissue rearrangement, rather than tissue removal.  Often the uvula is preserved in this procedure. Residual sleep apnea is common in patient after pharyngoplasty with an average reduction in sleep apnea events of 33%2.      Tongue Procedures:  While patients are awake, the muscles that surround the throat are active and keep this region open for breathing. These muscles relax during sleep, allowing the tongue and other structures to collapse and block breathing.  There are several different tongue procedures available.  Selection of a tongue base procedure depends on characteristics seen on physical exam.  Generally, procedures are aimed at removing bulky tissues in this area or preventing the back of the tongue from falling back during sleep.  Success rates for tongue surgery range from 50-62%3.    Hypoglossal Nerve Stimulation:  Hypoglossal nerve stimulation has recently received approval from the [place] Food and Drug Administration for the treatment of obstructive sleep apnea.  This is based on research showing that the system was safe and effective in treating sleep apnea6.  Results showed that the median AHI score decreased 68%, from 29.3 to 9.0. This therapy uses an implant system that senses breathing patterns and delivers mild stimulation to airway muscles, which keeps the airway open during sleep.  The system consists of three fully implanted components: a small generator (similar in size to a pacemaker), a breathing sensor, and a stimulation lead.  Using a small handheld remote, a  patient turns the therapy on before bed and off upon awakening.    Candidates for this device must be greater than 22 years of age, have moderate to severe GONZALO (AHI between 20-65), BMI less than 32, have tried CPAP/oral appliance without success, and have appropriate upper airway anatomy (determined by a sleep endoscopy performed by Dr. Edmond).    Hypoglossal Nerve Stimulation Pathway:    The sleep surgeon s office will work with the patient through the insurance prior-authorization process (including communications and appeals).    Nasal Procedures:  Nasal obstruction can interfere with nasal breathing during the day and night.  Studies have shown that relief of nasal obstruction can improve the ability of some patients to tolerate positive airway pressure therapy for obstructive sleep apnea1.  Treatment options include medications such as nasal saline, topical corticosteroid and antihistamine sprays, and oral medications such as antihistamines or decongestants. Non-surgical treatments can include external nasal dilators for selected patients. If these are not successful by themselves, surgery can improve the nasal airway either alone or in combination with these other options.      Combination Procedures:  Combination of surgical procedures and other treatments may be recommended, particularly if patients have more than one area of narrowing or persistent positional disease.  The success rate of combination surgery ranges from 66-80%2,3.      1. Sangeeta WISE. The Role of the Nose in Snoring and Obstructive Sleep Apnoea: An Update.  Eur Arch Otorhinolaryngol. 2011; 268: 1365-73.  2.  Gordy SM; Chanelle JA; Ubaldo JR; Pallanch JF; Sanjuana MB; Lavelle SG; Iva GONZALEZ. Surgical modifications of the upper airway for obstructive sleep apnea in adults: a systematic review and meta-analysis. SLEEP 2010;33(10):7021-6217. Edson FULLER. Hypopharyngeal surgery in obstructive sleep apnea: an evidence-based medicine review.  Arch  Otolaryngol Head Neck Surg. 2006 Feb;132(2):206-13.  3. Iam YH1, Mela Y, Abebe RAYA. The efficacy of anatomically based multilevel surgery for obstructive sleep apnea. Otolaryngol Head Neck Surg. 2003 Oct;129(4):327-35.  4. Kezirian E, Goldberg A. Hypopharyngeal Surgery in Obstructive Sleep Apnea: An Evidence-Based Medicine Review. Arch Otolaryngol Head Neck Surg. 2006 Feb;132(2):206-13.  5. Kadeem CORRIGAN et al. Upper-Airway Stimulation for Obstructive Sleep Apnea.  N Engl J Med. 2014 Jan 9;370(2):139-49.  6. Morro Y et al. Increased Incidence of Cardiovascular Disease in Middle-aged Men with Obstructive Sleep Apnea. Am J Respir Crit Care Med; 2002 166: 159-165  7. Rivera GUTIERREZ et al. Studying Life Effects and Effectiveness of Palatopharyngoplasty (SLEEP) study: Subjective Outcomes of Isolated Uvulopalatopharyngoplasty. Otolaryngol Head Neck Surg. 2011; 144: 623-631.  8.   Your blood pressure was checked while you were in clinic today.  Please read the guidelines below about what these numbers mean and what you should do about them.  Your systolic blood pressure is the top number.  This is the pressure when the heart is pumping.  Your diastolic blood pressure is the bottom number.  This is the pressure in between beats.  If your systolic blood pressure is less than 120 and your diastolic blood pressure is less than 80, then your blood pressure is normal. There is nothing more that you need to do about it  If your systolic blood pressure is 120-139 or your diastolic blood pressure is 80-89, your blood pressure may be higher than it should be.  You should have your blood pressure re-checked within a year by a primary care provider.  If your systolic blood pressure is 140 or greater or your diastolic blood pressure is 90 or greater, you may have high blood pressure.  High blood pressure is treatable, but if left untreated over time it can put you at risk for heart attack, stroke, or kidney failure.  You should have your  blood pressure re-checked by a primary care provider within the next four weeks.      Good Sleep hygiene tips (American Academy of Sleep Medicine):  Maintain a regular sleep-wake routine    Go to bed at the same time. Wake up at the same time. Ideally, your schedule will remain the same (+/- 20 minutes) every night of the week.  Avoid naps if possible    Naps decrease the  Sleep Debt  that is so necessary for easy sleep onset.    Each of us needs a certain amount of sleep per 24-hour period. We need that amount, and we don t need more than that.    When we take naps, it decreases the amount of sleep that we need the next night - which may cause sleep fragmentation and difficulty initiating sleep, and may lead to insomnia.  Don t stay in bed awake for more than 15-20 minutes (Stimulus control)    If you find your mind racing, or worrying about not being able to sleep during the middle of the night, get out of bed, and sit in a chair in the dark. Do your mind racing in the chair until you are sleepy, then return to bed. No TV, or phone/Ipad, or computer or internet or eat during these periods! That will just stimulate you more than desired.    If this happens several times during the night, that is OK. Just maintain your regular wake time, and try to avoid naps.  Don t watch TV, phone, computer, video games or read in bed or eat in bed.    When you watch TV or read in bed or eat in bed, you associate the bed with wakefulness.    The bed is reserved for two things - sleep and hanky panky.  Drink caffeinated drinks with caution    The effects of caffeine may last for several hours after ingestion. Caffeine can fragment sleep, and cause difficulty initiating sleep. If you drink caffeine, use it only before noon.    Remember that soda and tea contain caffeine as well.  Avoid inappropriate substances that interfere with sleep    Cigarettes, alcohol, and over-the-counter medications may cause fragmented sleep.  Exercise  regularly    Exercise promotes continuous sleep.    Rigorous exercise (close to bedtime cause) circulates endorphins into the body which may cause difficulty initiating sleep.   Have a quiet, comfortable bedroom    Set your bedroom thermostat at a comfortable temperature. Generally, a little cooler is better than a little warmer.    Turn off the TV and other extraneous noise that may disrupt sleep. Background  white noise  like a fan is OK.    If your pets awaken you, keep them outside the bedroom.    Your bedroom should be dark. Turn off bright lights.    Have a comfortable mattress.  If you are a  clock watcher  at night, hide the clock.      Have a comfortable pre-bedtime routine    A warm bath, shower    Meditation, or quiet time    Wind-down 20 minutes prior of bedtime.            Follow-ups after your visit        Your next 10 appointments already scheduled     Nov 03, 2017  2:40 PM CDT   Office Visit with Ifeoma Corcoran MD   [facility] ([facility] )    [address] [place] [id number] [phone]           Bring a current list of meds and any records pertaining to this visit. For Physicals, please bring immunization records and any forms needing to be filled out. Please arrive 10 minutes early to complete paperwork.            Nov 07, 2017 11:45 AM CST   Anticoagulation Visit with [facility]   [facility]    [address] [facility] [id number] [phone]            Dec 14, 2017   Procedure with Cirilo Alexander MD   [facility] ([facility])    [address]  [facility] [address]   [phone]           [facility] is located at [address] [place]              Future tests that were ordered for you today     Open Future Orders        Priority Expected Expires Ordered  "   HST-Home Sleep Apnea Test Routine  2018 10/24/2017            Who to contact     If you have questions or need follow up information about today's clinic visit or your schedule please contact [facility] directly at [phone].  Normal or non-critical lab and imaging results will be communicated to you by MyChart, letter or phone within 4 business days after the clinic has received the results. If you do not hear from us within 7 days, please contact the clinic through MyChart or phone. If you have a critical or abnormal lab result, we will notify you by phone as soon as possible.  Submit refill requests through [employer] or call your pharmacy and they will forward the refill request to us. Please allow 3 business days for your refill to be completed.          Additional Information About Your Visit        MyChart Information     [employer] lets you send messages to your doctor, view your test results, renew your prescriptions, schedule appointments and more. To sign up, go to [email or web address] . Click on \"Log in\" on the left side of the screen, which will take you to the Welcome page. Then click on \"Sign up Now\" on the right side of the page.     You will be asked to enter the access code listed below, as well as some personal information. Please follow the directions to create your username and password.     Your access code is: [email or web address]  Expires: 2017  2:04 PM     Your access code will  in 90 days. If you need help or a new code, please call your [facility] clinic or [phone].        Care EveryWhere ID     This is your Care EveryWhere ID. This could be used by other organizations to access your [facility] medical records  [id number]        Your Vitals Were     Pulse Respirations Height Pulse Oximetry BMI (Body Mass Index)       66 15 1.676 m (5' 6\") 97% 24.86 kg/m2        Blood Pressure from Last 3 Encounters:   10/24/17 106/70   10/06/17 114/78   10/02/17 " 113/73    Weight from Last 3 Encounters:   10/24/17 69.9 kg (154 lb)   10/06/17 69.9 kg (154 lb)   10/02/17 68.9 kg (152 lb)              We Performed the Following     SLEEP EVALUATION & MANAGEMENT REFERRAL - ADULT        Primary Care Provider Office Phone # Fax #    Cory Waldemar Acosta PA-C [phone] [address] [facility] [address]        Equal Access to Services     TITUS LEE : Hadii aad ku hadasho Soomaali, waaxda luqadaha, qaybta kaalmada adeegyada, waxay idiin hayaan adeeg kharash la'aan . So [facility] [phone].    ATENCIÓN: Si habla español, tiene a dubon disposición servicios gratuitos de asistencia lingüística. [facility] [phone].    We comply with applicable federal civil rights laws and [place] laws. We do not discriminate on the basis of race, color, national origin, age, disability, sex, sexual orientation, or gender identity.            Thank you!     Thank you for choosing [facility]  for your care. Our goal is always to provide you with excellent care. Hearing back from our patients is one way we can continue to improve our services. Please take a few minutes to complete the written survey that you may receive in the mail after your visit with us. Thank you!             Your Updated Medication List - Protect others around you: Learn how to safely use, store and throw away your medicines at www.disposemymeds.org.          This list is accurate as of: 10/24/17  3:41 PM.  Always use your most recent med list.                   Brand Name Dispense Instructions for use Diagnosis    lisinopril 20 MG tablet    PRINIVIL/ZESTRIL    90 tablet    Take 1 tablet (20 mg) by mouth daily    Membranous glomerulonephritis, Benign essential hypertension       olopatadine 0.1 % ophthalmic solution    PATANOL     INT 1 GTT IN OU BID        pravastatin 40 MG tablet    PRAVACHOL    90 tablet    TK 1 T PO QHS    Dyslipidemia       sulfamethoxazole-trimethoprim  800-160 MG per tablet    BACTRIM DS/SEPTRA DS    20 tablet    Take 1 tablet by mouth 2 times daily    Postoperative wound infection, initial encounter       valACYclovir 500 MG tablet    VALTREX     TAKE ONE TABLET PO DAILY FOR HERPES/SHINGLES WHILE ON CYCLOSPORIN        warfarin 5 MG tablet    COUMADIN

## 2017-10-24 NOTE — PATIENT INSTRUCTIONS
"    MY TREATMENT INFORMATION FOR SLEEP DISTURBANCE-  Harley Alvarado    DOCTOR : Lyndon AUGUSTINE [facility]  SLEEP CENTER :  [place]  MY CONTACT NUMBER:[phone]        If I haven't had a sleep study yet, what can I expect?  A personal story from [place]  https://www.CASTTube.com/watch?v=AxPLmlRpnCs    Am I having a home sleep study?  Here is a video in case you get home and want to make sure you have done it correctly  https://www.CASTTube.com/watch?v=[email or web address]&feature=youtu.be    Suspected sleep apnea: Sleep study ordered.    Follow up in sleep clinic 1-2 weeks after sleep study to discuss results of sleep study and treatment options.    Patient was advised not to drive if drowsy or sleepy.    Frequently asked questions:  1. What is Obstructive Sleep Apnea (GONZALO)? GONZALO is the most common type of sleep apnea. Apnea literally means, \"without breath.\" It is characterized by repetitive pauses in breathing, despite continued effort to breathe, and is usually associated with a reduction in blood oxygen saturation. Apneas can last 10 to over 60 seconds. It is caused by narrowing or collapse of the upper airway as muscles relax during sleep. Severity of sleep apnea is determined by frequency of breathing events and their effect on your sleep and oxygen levels determined during sleep testing.   2. What are the consequences of GONZALO? Symptoms include: daytime sleepiness- possibly increasing the risk of falling asleep while driving, unrefreshing/restless sleep, snoring, insomnia, waking frequently to urinate, waking with heartburn or reflux, reduced concentration and memory, and morning headaches. Other health consequences may include development of high blood pressure and other cardiovascular disease in persons who are susceptible. Untreated GONZALO  can contribute to heart disease, stroke and diabetes.   3. What are the treatment options? In most situations, sleep apnea is a lifelong disease that must be managed with daily therapy. " Medications are not effective for sleep apnea and surgery is generally not performed until other therapies have been tried. Therapy is usually tailored to the individual patient based on many factors including your wishes as well as severity of sleep apnea and severity of obesity. Continuous Positive Airway (CPAP) is the most reliable treatment. An oral device to hold your jaw forward is usually the next most reliable option. Other options include postioning devices (to keep you off your back), weight loss, and surgery including a tongue pacing device. There is more detail about some of these options below.            1. CPAP-  WHAT DOES IT DO AND HOW CAN I LEARN TO WEAR IT?                               BEFORE I START, CAN I WATCH A MOVIE TO GET A PLAN ON HOW TO USE CPAP?  https://www.youHealth eVillages.com/watch?e=p4I41sw325E      Continuous positive airway pressure, or CPAP, is the most effective treatment for obstructive sleep apnea. It works by blowing room air, through a mask, to hold your throat open. A decision to use CPAP is a major step forward in the pursuit of a healthier life. The successful use of CPAP will help you breathe easier, sleep better and live healthier. You can choose CPAP equipment from any durable medical equipment provider that meets your needs.  Using CPAP can be a positive experience if you keep these gong points in mind:  1. Commitment  CPAP is not a quick fix for your problem. It involves a long-term commitment to improve your sleep and your health.    2. Communication  Stay in close communication with both your sleep doctor and your CPAP supplier. Ask lots of questions and seek help when you need it.    3. Consistency  Use CPAP all night, every night and for every nap. You will receive the maximum health benefits from CPAP when you use it every time that you sleep. This will also make it easier for your body to adjust to the treatment.    4. Correction  The first machine and mask that you try  "may not be the best ones for you. Work with your sleep doctor and your CPAP supplier to make corrections to your equipment selection. Ask about trying a different type of machine or mask if you have ongoing problems. Make sure that your mask is a good fit and learn to use your equipment properly.    5. Challenge  Tell a family member or close friend to ask you each morning if you used your CPAP the previous night. Have someone to challenge you to give it your best effort.    6. Connection   Your adjustment to CPAP will be easier if you are able to connect with others who use the same treatment. Ask your sleep doctor if there is a support group in your area for people who have sleep apnea, or look for one on the Internet.  7. Comfort   Increase your level of comfort by using a saline spray, decongestant or heated humidifier if CPAP irritates your nose, mouth or throat. Use your unit's \"ramp\" setting to slowly get used to the air pressure level. There may be soft pads you can buy that will fit over your mask straps. Look on www.CPAP.com for accessories that can help make CPAP use more comfortable.  8. Cleaning   Clean your mask, tubing and headgear on a regular basis. Put this time in your schedule so that you don't forget to do it. Check and replace the filters for your CPAP unit and humidifier.    9. Completion   Although you are never finished with CPAP therapy, you should reward yourself by celebrating the completion of your first month of treatment. Expect this first month to be your hardest period of adjustment. It will involve some trial and error as you find the machine, mask and pressure settings that are right for you.    10. Continuation  After your first month of treatment, continue to make a daily commitment to use your CPAP all night, every night and for every nap.    CPAP-Tips to starting with success:  Begin using your CPAP for short periods of time during the day while you watch TV or read.    Use " CPAP every night and for every nap. Using it less often reduces the health benefits and makes it harder for your body to get used to it.    Make small adjustments to your mask, tubing, straps and headgear until you get the right fit. Tightening the mask may actually worsen the leak.  If it leaves significant marks on your face or irritates the bridge of your nose, it may not be the best mask for you.  Speak with the person who supplied the mask and consider trying other masks. Insurances will allow you to try different masks during the first month of starting CPAP.  Insurance also covers a new mask, hose and filter about every 6 months.    Use a saline nasal spray to ease mild nasal congestion. Neti-Pot or saline nasal rinses may also help. Nasal gel sprays can help reduce nasal dryness.  Biotene mouthwash can be helpful to protect your teeth if you experience frequent dry mouth.  Dry mouth may be a sign of air escaping out of your mouth or out of the mask in the case of a full face mask.  Speak with your provider if you expect that is the case.     Take a nasal decongestant to relieve more severe nasal or sinus congestion.  Do not use Afrin (oxymetazoline) nasal spray more than 3 days in a row.  Speak with your sleep doctor if your nasal congestion is chronic.    Use a heated humidifier that fits your CPAP model to enhance your breathing comfort. Adjust the heat setting up if you get a dry nose or throat, down if you get condensation in the hose or mask.  Position the CPAP lower than you so that any condensation in the hose drains back into the machine rather than towards the mask.    Try a system that uses nasal pillows if traditional masks give you problems.    Clean your mask, tubing and headgear once a week. Make sure the equipment dries fully.    Regularly check and replace the filters for your CPAP unit and humidifier.    Work closely with your sleep provider and your CPAP supplier to make sure that you have  the machine, mask and air pressure setting that works best for you. It is better to stop using it and call your provider to solve problems than to lay awake all night frustrated with the device.    BESIDES CPAP, WHAT OTHER THERAPIES ARE THERE?      Positioning Device  Positioning devices are generally used when sleep apnea is mild and only occurs on your back.This example shows a pillow that straps around the waist. It may be appropriate for those whose sleep study shows milder sleep apnea that occurs primarily when lying flat on one's back. Preliminary studies have shown benefit but effectiveness at home may need to be verified by a home sleep test. These devices are generally not covered by medical insurance.                      Oral Appliance  What is oral appliance therapy?  An oral appliance is a small acrylic device that fits over the upper and lower teeth or tongue (similar to an orthodontic retainer or a mouth guard). This device slightly advances the lower jaw or tongue, which moves the base of the tongue forward, opens the airway, improves breathing and can effectively treat snoring and obstructive sleep apnea sleep apnea. The appliance is fabricated and customized by a qualified dentist with experience in treating snoring and sleep apnea. Oral appliances are usually well tolerated and have relatively high compliance by patients1, 2, 3.  When is an oral appliance indicated?  Oral appliance therapy is recommended as a first-line treatment for patients with primary snoring, mild sleep apnea, and for patients with moderate sleep apnea who prefer appliance therapy to use of CPAP4, 5. Severity of sleep apnea is determined by sleep testing and is based on the number of respiratory events per hour of sleep.   How successful is oral appliance therapy?  The success rate of oral appliance therapy in patients with mild sleep apnea is 75-80% while in patients with moderate sleep apnea it is 50-70%. The chance of  success in patients with severe sleep apnea is 40-50%. The research also shows that oral appliances have a beneficial effect on the cardiovascular health of GONZALO patients at the same magnitude as CPAP therapy7.  Oral appliances should be a second-line treatment in cases of severe sleep apnea, but if not completely successful then a combination therapy utilizing CPAP plus oral appliance therapy may be effective. Oral appliances tend to be effective in a broad range of patients although studies show that the patients who have the highest success are females, younger patients, those with milder disease, and less severe obesity. 3, 6.   The chances of success are lower in patients who have more severe GONZALO, are older, and those who are morbidly obese.     Example of an oral appliance   Finding a dentist that practices dental sleep medicine  Specific training is available through the American Academy of Dental Sleep Medicine for dentists interested in working in the field of sleep. To find a dentist who is educated in the field of sleep and the use of oral appliances, near you, visit the Web site of the American Academy of Dental Sleep Medicine; also see   http://www.accpstorage.org/newOrganization/patients/oralAppliances.pdf  To search for a dentist certified in these practices:  Http://aadsm.org/FindADentist.aspx?1  1. Issa, et al. Objectively measured vs self-reported compliance during oral appliance therapy for sleep-disordered breathing. Chest 2013; 144(5): 1007-3360.  2. Mika, et al. Objective measurement of compliance during oral appliance therapy for sleep-disordered breathing. Thorax 2013; 68(1): 91-96.  3. Jatin et al. Mandibular advancement devices in 620 men and women with GONZALO and snoring: tolerability and predictors of treatment success. Chest 2004; 125: 4723-6039.  4. Allan, et al. Oral appliances for snoring and GONZALO: a review. Sleep 2006; 29: 244-262.  5. Denisa et al. Oral appliance  treatment for GONZALO: an update. J Clin Sleep Med 2014; 10(2): 215-227.  6. Jessie et al. Predictors of OSAH treatment outcome. J Dent Res 2007; 86: 1272-2524.    Nasal Valves                 Nasal valves may not be effective if you have frequent nasal congestion or have difficulty breathing through your nose. They may be an option for mild apnea if other options are not well tolerated. The efficacy of these devices is generally less than CPAP or oral appliances.      Weight Loss:    Weight management is a personal decision.  If you are interested in exploring weight loss strategies, the following discussion covers the impact on weight loss on sleep apnea and the approaches that may be successful.    Weight loss decreases severity of sleep apnea in most people with obesity. For those with mild obesity who have developed snoring with weight gain, even 15-30 pound weight loss can improve and occasionally eliminate sleep apnea.  Structured and life-long dietary and health habits are necessary to lose weight and keep healthier weight levels.     Though there may be significant health benefits from weight loss, long-term weight loss is very difficult to achieve- studies show success with dietary management in less than 10% of people. In addition, substantial weight loss may require years of dietary control and may be difficult if patients have severe obesity. In these cases, surgical management may be considered.  Finally, older individuals who have tolerated obesity without health complications may be less likely to benefit from weight loss strategies.    Your BMI is Body mass index is 24.86 kg/(m^2).  Body mass index (BMI) is one way to tell whether you are at a healthy weight, overweight, or obese. It measures your weight in relation to your height.  A BMI of 18.5 to 24.9 is in the healthy range. A person with a BMI of 25 to 29.9 is considered overweight, and someone with a BMI of 30 or greater is considered obese.  More than two-thirds of American adults are considered overweight or obese.  Being overweight or obese increases the risk for further weight gain. Excess weight may lead to heart disease and diabetes.  Creating and following plans for healthy eating and physical activity may help you improve your health.  Weight control is part of healthy lifestyle and includes exercise, emotional health, and healthy eating habits. Careful eating habits lifelong are the mainstay of weight control. Though there are significant health benefits from weight loss, long-term weight loss with diet alone may be very difficult to achieve- studies show long-term success with dietary management in less than 10% of people. Attaining a healthy weight may be especially difficult to achieve in those with severe obesity. In some cases, medications, devices and surgical management might be considered.  What can you do?  If you are overweight or obese and are interested in methods for weight loss, you should discuss this with your provider.     Consider reducing daily calorie intake by 500 calories.     Keep a food journal.     Avoiding skipping meals, consider cutting portions instead.    Diet combined with exercise helps maintain muscle while optimizing fat loss. Strength training is particularly important for building and maintaining muscle mass. Exercise helps reduce stress, increase energy, and improves fitness. Increasing exercise without diet control, however, may not burn enough calories to loose weight.       Start walking three days a week 10-20 minutes at a time    Work towards walking thirty minutes five days a week     Eventually, increase the speed of your walking for 1-2 minutes at time    In addition, we recommend that you review healthy lifestyles and methods for weight loss available through the National Institutes of Health patient information sites:  http://win.niddk.nih.gov/publications/index.htm    And look into health and  wellness programs that may be available through your health insurance provider, employer, local community center, or dominik club.        Surgery:    Upper Airway Surgery for GONZALO  Surgery for GONZALO is a second-line treatment option in the management of sleep apnea.  Surgery should be considered for patients who are having a difficult time tolerating CPAP.    Surgery for GONZALO is directed at areas that are responsible for narrowing or complete obstruction of the airway during sleep.  There are a wide range of procedures available to enlarge and/or stabilize the airway to prevent blockage of breathing in the three major areas where it can occur: the palate, tongue, and nasal regions.  Successful surgical treatment depends on the accurate identification of the factors responsible for obstructive sleep apnea in each person.  A personalized approach is required because there is no single treatment that works well for everyone.  Because of anatomic variation, consultation with an examination by a sleep surgeon is a critical first step in determining what surgical options are best for each patient.  In some cases, examination during sedation may be recommended in order to guide the selection of procedures.  Patients will be counseled about risks and benefits as well as the typical recovery course after surgery. Surgery is typically not a cure for a person s GONZALO.  However, surgery will often significantly improve one s GONZALO severity (termed  success rate ).  Even in the absence of a cure, surgery will decrease the cardiovascular risk associated with OSA7; improve overall quality of life8 (sleepiness, functionality, sleep quality, etc).          Palate Procedures:  Patients with GONZALO often have narrowing of their airway in the region of their tonsils and uvula.  The goals of palate procedures are to widen the airway in this region as well as to help the tissues resist collapse.  Modern palate procedure techniques focus on tissue  conservation and soft tissue rearrangement, rather than tissue removal.  Often the uvula is preserved in this procedure. Residual sleep apnea is common in patient after pharyngoplasty with an average reduction in sleep apnea events of 33%2.      Tongue Procedures:  While patients are awake, the muscles that surround the throat are active and keep this region open for breathing. These muscles relax during sleep, allowing the tongue and other structures to collapse and block breathing.  There are several different tongue procedures available.  Selection of a tongue base procedure depends on characteristics seen on physical exam.  Generally, procedures are aimed at removing bulky tissues in this area or preventing the back of the tongue from falling back during sleep.  Success rates for tongue surgery range from 50-62%3.    Hypoglossal Nerve Stimulation:  Hypoglossal nerve stimulation has recently received approval from the [place] Food and Drug Administration for the treatment of obstructive sleep apnea.  This is based on research showing that the system was safe and effective in treating sleep apnea6.  Results showed that the median AHI score decreased 68%, from 29.3 to 9.0. This therapy uses an implant system that senses breathing patterns and delivers mild stimulation to airway muscles, which keeps the airway open during sleep.  The system consists of three fully implanted components: a small generator (similar in size to a pacemaker), a breathing sensor, and a stimulation lead.  Using a small handheld remote, a patient turns the therapy on before bed and off upon awakening.    Candidates for this device must be greater than 22 years of age, have moderate to severe GONZALO (AHI between 20-65), BMI less than 32, have tried CPAP/oral appliance without success, and have appropriate upper airway anatomy (determined by a sleep endoscopy performed by Dr. Edmond).    Hypoglossal Nerve Stimulation Pathway:    The sleep  surgeon s office will work with the patient through the insurance prior-authorization process (including communications and appeals).    Nasal Procedures:  Nasal obstruction can interfere with nasal breathing during the day and night.  Studies have shown that relief of nasal obstruction can improve the ability of some patients to tolerate positive airway pressure therapy for obstructive sleep apnea1.  Treatment options include medications such as nasal saline, topical corticosteroid and antihistamine sprays, and oral medications such as antihistamines or decongestants. Non-surgical treatments can include external nasal dilators for selected patients. If these are not successful by themselves, surgery can improve the nasal airway either alone or in combination with these other options.      Combination Procedures:  Combination of surgical procedures and other treatments may be recommended, particularly if patients have more than one area of narrowing or persistent positional disease.  The success rate of combination surgery ranges from 66-80%2,3.      1. Sangeeta WISE. The Role of the Nose in Snoring and Obstructive Sleep Apnoea: An Update.  Eur Arch Otorhinolaryngol. 2011; 268: 1365-73.  2.  Gordy SM; Chanelle JA; Ubaldo JR; Pallanch JF; Sanjuana MB; Lavelle SG; Iva GONZALEZ. Surgical modifications of the upper airway for obstructive sleep apnea in adults: a systematic review and meta-analysis. SLEEP 2010;33(10):6043-3181. Edson FULLER. Hypopharyngeal surgery in obstructive sleep apnea: an evidence-based medicine review.  Arch Otolaryngol Head Neck Surg. 2006 Feb;132(2):206-13.  3. Iam YH1, Mela Y, Abebe RAYA. The efficacy of anatomically based multilevel surgery for obstructive sleep apnea. Otolaryngol Head Neck Surg. 2003 Oct;129(4):327-35.  4. Edson FULLER, Goldberg A. Hypopharyngeal Surgery in Obstructive Sleep Apnea: An Evidence-Based Medicine Review. Arch Otolaryngol Head Neck Surg. 2006 Feb;132(2):206-13.  5. Kadeem CORRIGAN  et al. Upper-Airway Stimulation for Obstructive Sleep Apnea.  N Engl J Med. 2014 Jan 9;370(2):139-49.  6. Morro Y et al. Increased Incidence of Cardiovascular Disease in Middle-aged Men with Obstructive Sleep Apnea. Am J Respir Crit Care Med; 2002 166: 159-165  7. Rivera GUTIERREZ et al. Studying Life Effects and Effectiveness of Palatopharyngoplasty (SLEEP) study: Subjective Outcomes of Isolated Uvulopalatopharyngoplasty. Otolaryngol Head Neck Surg. 2011; 144: 623-631.  8.   Your blood pressure was checked while you were in clinic today.  Please read the guidelines below about what these numbers mean and what you should do about them.  Your systolic blood pressure is the top number.  This is the pressure when the heart is pumping.  Your diastolic blood pressure is the bottom number.  This is the pressure in between beats.  If your systolic blood pressure is less than 120 and your diastolic blood pressure is less than 80, then your blood pressure is normal. There is nothing more that you need to do about it  If your systolic blood pressure is 120-139 or your diastolic blood pressure is 80-89, your blood pressure may be higher than it should be.  You should have your blood pressure re-checked within a year by a primary care provider.  If your systolic blood pressure is 140 or greater or your diastolic blood pressure is 90 or greater, you may have high blood pressure.  High blood pressure is treatable, but if left untreated over time it can put you at risk for heart attack, stroke, or kidney failure.  You should have your blood pressure re-checked by a primary care provider within the next four weeks.      Good Sleep hygiene tips (American Academy of Sleep Medicine):  Maintain a regular sleep-wake routine    Go to bed at the same time. Wake up at the same time. Ideally, your schedule will remain the same (+/- 20 minutes) every night of the week.  Avoid naps if possible    Naps decrease the  Sleep Debt  that is so necessary  for easy sleep onset.    Each of us needs a certain amount of sleep per 24-hour period. We need that amount, and we don t need more than that.    When we take naps, it decreases the amount of sleep that we need the next night - which may cause sleep fragmentation and difficulty initiating sleep, and may lead to insomnia.  Don t stay in bed awake for more than 15-20 minutes (Stimulus control)    If you find your mind racing, or worrying about not being able to sleep during the middle of the night, get out of bed, and sit in a chair in the dark. Do your mind racing in the chair until you are sleepy, then return to bed. No TV, or phone/Ipad, or computer or internet or eat during these periods! That will just stimulate you more than desired.    If this happens several times during the night, that is OK. Just maintain your regular wake time, and try to avoid naps.  Don t watch TV, phone, computer, video games or read in bed or eat in bed.    When you watch TV or read in bed or eat in bed, you associate the bed with wakefulness.    The bed is reserved for two things - sleep and hanky panky.  Drink caffeinated drinks with caution    The effects of caffeine may last for several hours after ingestion. Caffeine can fragment sleep, and cause difficulty initiating sleep. If you drink caffeine, use it only before noon.    Remember that soda and tea contain caffeine as well.  Avoid inappropriate substances that interfere with sleep    Cigarettes, alcohol, and over-the-counter medications may cause fragmented sleep.  Exercise regularly    Exercise promotes continuous sleep.    Rigorous exercise (close to bedtime cause) circulates endorphins into the body which may cause difficulty initiating sleep.   Have a quiet, comfortable bedroom    Set your bedroom thermostat at a comfortable temperature. Generally, a little cooler is better than a little warmer.    Turn off the TV and other extraneous noise that may disrupt sleep. Background   white noise  like a fan is OK.    If your pets awaken you, keep them outside the bedroom.    Your bedroom should be dark. Turn off bright lights.    Have a comfortable mattress.  If you are a  clock watcher  at night, hide the clock.      Have a comfortable pre-bedtime routine    A warm bath, shower    Meditation, or quiet time    Wind-down 20 minutes prior of bedtime.

## 2017-11-03 ENCOUNTER — OFFICE VISIT (OUTPATIENT)
Dept: FAMILY MEDICINE | Facility: CLINIC | Age: 62
End: 2017-11-03
Payer: COMMERCIAL

## 2017-11-03 DIAGNOSIS — L21.9 SEBORRHEIC DERMATITIS: ICD-10-CM

## 2017-11-03 DIAGNOSIS — L91.8 SKIN TAG: ICD-10-CM

## 2017-11-03 DIAGNOSIS — Z12.83 SKIN CANCER SCREENING: Primary | ICD-10-CM

## 2017-11-03 DIAGNOSIS — D48.5 NEOPLASM OF UNCERTAIN BEHAVIOR OF SKIN: ICD-10-CM

## 2017-11-03 DIAGNOSIS — D23.9 DYSPLASTIC NEVUS OF SKIN: ICD-10-CM

## 2017-11-03 DIAGNOSIS — L81.4 SOLAR LENTIGO: ICD-10-CM

## 2017-11-03 DIAGNOSIS — D22.9 MULTIPLE BENIGN MELANOCYTIC NEVI: ICD-10-CM

## 2017-11-03 DIAGNOSIS — Z86.018 HISTORY OF DYSPLASTIC NEVUS: ICD-10-CM

## 2017-11-03 PROCEDURE — 88305 TISSUE EXAM BY PATHOLOGIST: CPT | Performed by: FAMILY MEDICINE

## 2017-11-03 PROCEDURE — 99213 OFFICE O/P EST LOW 20 MIN: CPT | Mod: 25 | Performed by: FAMILY MEDICINE

## 2017-11-03 PROCEDURE — 99000 SPECIMEN HANDLING OFFICE-LAB: CPT | Performed by: FAMILY MEDICINE

## 2017-11-03 PROCEDURE — 11300 SHAVE SKIN LESION 0.5 CM/<: CPT | Performed by: FAMILY MEDICINE

## 2017-11-03 RX ORDER — CICLOPIROX OLAMINE 7.7 MG/G
CREAM TOPICAL 2 TIMES DAILY
Qty: 30 G | Refills: 3 | Status: CANCELLED | OUTPATIENT
Start: 2017-11-03

## 2017-11-03 RX ORDER — KETOCONAZOLE 20 MG/G
CREAM TOPICAL 2 TIMES DAILY
Qty: 30 G | Refills: 1 | Status: SHIPPED | OUTPATIENT
Start: 2017-11-03 | End: 2018-05-30

## 2017-11-03 NOTE — LETTER
November 7, 2017    Harley Alvarado  [address] [place] [id number]        Dear Harley,    The lesion that was removed from the left buttocks was benign (not cancer) and is called a mild atypical nevus. A nevus with atypical changes has a small potential to evolve into a melanoma; therefore, we usually recommend completely removing the atypical nevus. Your atypical nevus was completely removed. If any pigmentation should recur then it should be evaluated.     I would recommend annual full-body skin exam.     Thank you for allowing me to be involved in your health care and for choosing [facility] [place].  If you have any questions or concerns please feel free to contact me, [phone].      Sincerely,      Ifeoma Corcoran M.D.

## 2017-11-03 NOTE — PROGRESS NOTES
[facility] - PRIMARY CARE SKIN    CC : skin cancer screening (full-body)  SUBJECTIVE:                                                    Harley Alvarado is a 61 year old male who presents to clinic today for a full-body skin exam because of recent diagnosis of mild dysplastic nevus. This lesion was excised by punch tool with pathology report of dysplastic features and mild atypia; a subsequent post-procedure infection developed and has been treated with Bactrim.     No other bothersome lesions noticed by the patient or other skin concerns.  Issue One : Dry skin on face is a recent issue over the last 1-2 months. Areas of involvement include eyebrows, forehead, sides of nose and ears. He has been using Head & Shoulders. He has had decreasing control of scaling.    Issue Two : A skin tag is in the left armpit.    Tissue Pathology Report from 10/2/2017        Current medication includes warfarin.    Personal history of skin cancer : NO - history of dysplastic nevus.  Family history of skin cancer : NO.    Sun Exposure History  Previous history of significant sun exposure: Outdoor hobbies include fishing.  Blistering sunburns : YES - once on entire body  Tanning beds : NO.  Sunscreen Use : YES, frequency : when outdoors for extended durations.  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    INTEGUMENTARY/SKIN: POSITIVE for mole changes  ROS : 14 point review of systems was negative except the symptoms listed above in the HPI.    This document serves as a record of the services and decisions personally performed and made by Deborah Corcoran MD. It was created on her behalf by Juan Garcia, a trained medical scribe.  The creation of this document is based on the scribe's personal observations and the provider's statements to the  medical scribe.  Juan Garcia, November 3, 2017 2:42 PM      OBJECTIVE:                                                    GENERAL: healthy, alert and no distress  SKIN: [place] Skin Type - II.  This patient was examined from the top of the head to the bottom of the feet  including scalp, face, neck, back, chest, breasts, buttocks, both arms, both legs, both hands, both feet, all 10 fingers and all 10 toes. The dermatoscope was used to help evaluate pigmented lesions.  Skin Pertinent Findings:  Chest : Scattered brown, macule(s) most consistent with benign solar lentigo. Scattered 2 mm - 3 mm in size, brown macules most consistent with benign nevi (melanocytic nevi).    Left upper chest : healing biopsy site.    Left axilla : 3 mm in size, brown, pedunculated, benign-appearing skin tag(s).    Right dorsal great toe : 2 mm in size, brown macule most consistent with a benign nevus.    Legs : Scattered 2 mm - 3 mm in size, brown macules most consistent with benign nevi (melanocytic nevi).    Back : Multiple brown macules most consistent with benign nevi (melanocytic nevi)    Right foot plantar surface : 2 mm in size brown macule most consistent with a benign nevus.    Significant Findings:  Buttocks, left upper outer quadrant : 4 mm x 3 mm in size, irregularly pigmented brown macule. ? Atypical Nevus ? Other    Left shoulder : 3 mm wide x 8 mm long healed scar with residual pigmentation. Previous pathology read junctional dysplastic nevus with mild atypia; changes appear to extend to a peripheral margin. Patient to return for wide re-excision.    Face : Scaling on sides of nose extending onto ala nasi and on forehead. Scattered erythematous scaling on sides of scalp and central portion of face and extending onto ears consistent with seborrheic dermatitis.    Diagnostic Test Results:  none           ASSESSMENT:                                                      Encounter Diagnoses   Name Primary?     Skin cancer  screening Yes     Neoplasm of uncertain behavior of skin      History of dysplastic nevus      Seborrheic dermatitis      Solar lentigo      Multiple benign melanocytic nevi      Skin tag          PLAN:                                                    Patient Instructions   FUTURE APPOINTMENTS    Follow up for a 40 minute wide excision for dysplastic nevus on the left shoulder.    Follow up every 1 year(s) for a full-body skin cancer screening.    TOPICAL MEDICATION INSTRUCTIONS  Ketoconazole 2% cream    Apply a thin layer to the affected area(s) on the face and ears two times per day. Continue to use as needed to control symptoms    This is not a steroid, and it can be used on the face.    Keep in mind to also regularly use moisturizer, as this preventative measure can help maintain your skin's natural moisture barrier.    Apply moisturizer after application of medication.    SHAMPOO INSTRUCTIONS  Continue alternating use of OTC anti-dandruff shampoos with different active ingredients every 4 week(s).   Coal Tar shampoos : Neutrogena T/Gel, Denorex, DHS Tar, Ionil-T, Tegrin, X-Seb T, Zetar  Zinc Pyrithione shampoos : Head & Shoulders, Denorex Advance Formula, DHS Zinc, Zincon  Salicylic Acid shampoos : Neutrogena T/Sal, DHS Sal, Ionil, P&S, Sebulex, X-Seb  Selenium Sulfide shampoos : Selsun Blue shampoo  Sulfur shampoos : Sebulex    SUN PROTECTION INSTRUCTIONS  Sun damage can lead to skin cancer and premature aging of the skin.      The best way to protect from sun damage to your skin is to avoid the sun during peak hours (10 am - 2 pm) even on overcast days.      Use UPF sun-protective clothing, which while more expensive initially provides longer lasting coverage without having to worry about remembering to re-apply.  1. Wear a wide-brimmed hat and sunglasses.   2. Wear sun-protective clothing.  [employer] and other [employer] make sun protective clothing that are stylish, comfortable and cool. [employer]ery  [employer] and other [employer] make UV arm sleeves suitable for golfing, gardening and other activities.      Sunscreen instructions:  1. Use sunscreens with Zinc Oxide, Titanium Dioxide or Avobenzone to protect from UVA rays.  2. Use SPF 30-50+ to protect from UVB rays.  3. Re-apply every 2 hours even if water resistant.  4. Apply on your face every day even when cloudy and even in the winter. UVA \"aging rays\" penetrate window glass and are just as strong in the winter as in the summer.    Product Recommendations:    Good examples include: Blue Lizard, EltaMD, Solbar    Good daily moisturizers with SPF: Vanicream, CeraVe.    For sensitive skin, consider : SkinMedica Essential Defense Mineral Shield Broad Spectrum SPF 35      Never use tanning beds. Tanning beds are associated with much higher risks of skin cancer.    All tanning damages the skin. Aim for ivory skin year round and you will have less trouble with your skin in years to come. There is no merit in getting \"a base tan\" before a warm weather vacation, as any tanning indicates your body's response to sun damage.    Stop smoking. Smokers have higher rates of skin cancer and also have premature skin wrinkling.    FYI  You should use about 3 tablespoons of sunscreen to protect your whole body. Thus a typical eight ounce bottle of sunscreen should last 4 applications. Remember, that the SPF rating is compromised if you don t apply enough. Most people only apply 1/2 - 1/3 of the amount they need. Also don t forget areas such as your ears, feet, upper back and harder to reach places. Keep in mind that these amounts should be increased for larger body sizes.    Sunscreens with titanium dioxide and/or zinc oxide in the active ingredients are physical blockers as opposed to chemical blockers. Chemical-free sunscreens should not irritate the skin.    Spray-on sunscreens may be used for touch-up application only, not as a base layer. Also, use with caution around small " children due to inhalation risk.    Avoid retinyl palmitate products.    Avoid combination products that include both sunscreen and insect repellant, as sunscreen should be applied every 2 hours, but insect repellant should not be applied as frequently.    SPF means sun protection factor, which is just the degree to which the sunscreen can protect against UVB rays. There is no rating system for UVA rays. SPF is calculated as the time skin will burn when sunscreen is applied vs. skin without sunscreen.    Water resistant sunscreens should be re-applied every 1-2 hours.    For more information:  http://www.skincancer.org/prevention/sun-protection/sunscreen/sunscreens-safe-and-effective    The patient was counseled about sunscreens and sun avoidance. The patient was counseled to check the skin regularly and report any lesion that is new, changing, itching, scabbing, bleeding or otherwise bothersome. The patient was discharged ambulatory and in stable condition.        PROCEDURES:                                                    Name : Shave Excision  Indication : Excision of tissue for pathology evaluation.  Location(s) : Buttocks, left upper outer quadrant : 4 mm x 3 mm in size, irregularly pigmented brown macule. ? Atypical Nevus ? Other.  Completed by : Deborah Corcoran MD  Photo Taken : no.  Anesthesia : Patient was anesthetized by infiltrating the area surrounding the lesion with 1% lidocaine.   epinephrine [id number] : Yes.  Buffered with bicarbonate : Yes.  Note : Discussed the risk of pain, infection, scarring, hypo- or hyperpigmentation and recurrence or need for re-treatment. The benefits of treatment and alternative treatments were also discussed.    During this procedure, the universal protocol was utilized. The patient's identity was confirmed by no less than two patient identifiers, correct procedure was verified, correct site was verified and marked as applicable and a final pause was completed.    Sterile  technique was used throughout the procedure. The skin was cleaned and prepped with surgical cleanser. Once adequate anesthesia was obtained, the lesion was removed with a deep scallop shave procedure. The specimen was sent to pathology.    Direct pressure and monsels's and monopolar cautery was applied for hemostasis. No bleeding was present upon the completion of the procedure. The wound was coated with antibacterial ointment. A dry sterile dressing was applied. Patient tolerated the procedure well and left in satisfactory condition.    Primary provider and referring provider will be informed regarding the tissue report when it returns.        The information in this document, created by the medical scribe for me, accurately reflects the services I personally performed and the decisions made by me. I have reviewed and approved this document for accuracy prior to leaving the patient care area.  Deborah Corcoran MD November 3, 2017 2:42 PM  [facility] - PRIMARY CARE SKIN

## 2017-11-03 NOTE — LETTER
11/3/2017         RE: Harley Alvarado  [address] [place] [id number]        Dear Colleague,    Thank you for referring your patient, Harley Alvarado, to the [facility] - PRIMARY CARE SKIN. Please see a copy of my visit note below.    [facility] PRIMARY CARE SKIN    CC : skin cancer screening (full-body)  SUBJECTIVE:                                                    Harley Alvarado is a 61 year old male who presents to clinic today for a full-body skin exam because of recent diagnosis of mild dysplastic nevus. This lesion was excised by punch tool with pathology report of dysplastic features and mild atypia; a subsequent post-procedure infection developed and has been treated with Bactrim.     No other bothersome lesions noticed by the patient or other skin concerns.  Issue One : Dry skin on face is a recent issue over the last 1-2 months. Areas of involvement include eyebrows, forehead, sides of nose and ears. He has been using Head & Shoulders. He has had decreasing control of scaling.    Issue Two : A skin tag is in the left armpit.    Tissue Pathology Report from 10/2/2017        Current medication includes warfarin.    Personal history of skin cancer : NO - history of dysplastic nevus.  Family history of skin cancer : NO.    Sun Exposure History  Previous history of significant sun exposure: Outdoor hobbies include fishing.  Blistering sunburns : YES - once on entire body  Tanning beds : NO.  Sunscreen Use : YES, frequency : when outdoors for extended durations.  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    INTEGUMENTARY/SKIN: POSITIVE for mole changes  ROS : 14 point review of systems was negative except the symptoms listed above in the HPI.    This document serves as a record of the  services and decisions personally performed and made by Deborah Corcoran MD. It was created on her behalf by Juan Garcia, a trained medical scribe.  The creation of this document is based on the scribe's personal observations and the provider's statements to the medical scribe.  Juan Garcia, November 3, 2017 2:42 PM      OBJECTIVE:                                                    GENERAL: healthy, alert and no distress  SKIN: [place] Skin Type - II.  This patient was examined from the top of the head to the bottom of the feet  including scalp, face, neck, back, chest, breasts, buttocks, both arms, both legs, both hands, both feet, all 10 fingers and all 10 toes. The dermatoscope was used to help evaluate pigmented lesions.  Skin Pertinent Findings:  Chest : Scattered brown, macule(s) most consistent with benign solar lentigo. Scattered 2 mm - 3 mm in size, brown macules most consistent with benign nevi (melanocytic nevi).    Left upper chest : healing biopsy site.    Left axilla : 3 mm in size, brown, pedunculated, benign-appearing skin tag(s).    Right dorsal great toe : 2 mm in size, brown macule most consistent with a benign nevus.    Legs : Scattered 2 mm - 3 mm in size, brown macules most consistent with benign nevi (melanocytic nevi).    Back : Multiple brown macules most consistent with benign nevi (melanocytic nevi)    Right foot plantar surface : 2 mm in size brown macule most consistent with a benign nevus.    Significant Findings:  Buttocks, left upper outer quadrant : 4 mm x 3 mm in size, irregularly pigmented brown macule. ? Atypical Nevus ? Other    Left shoulder : 3 mm wide x 8 mm long healed scar with residual pigmentation. Previous pathology read junctional dysplastic nevus with mild atypia; changes appear to extend to a peripheral margin. Patient to return for wide re-excision.    Face : Scaling on sides of nose extending onto ala nasi and on forehead. Scattered erythematous scaling on sides of  scalp and central portion of face and extending onto ears consistent with seborrheic dermatitis.    Diagnostic Test Results:  none           ASSESSMENT:                                                      Encounter Diagnoses   Name Primary?     Skin cancer screening Yes     Neoplasm of uncertain behavior of skin      History of dysplastic nevus      Seborrheic dermatitis      Solar lentigo      Multiple benign melanocytic nevi      Skin tag          PLAN:                                                    Patient Instructions   FUTURE APPOINTMENTS    Follow up for a 40 minute wide excision for dysplastic nevus on the left shoulder.    Follow up every 1 year(s) for a full-body skin cancer screening.    TOPICAL MEDICATION INSTRUCTIONS  Ketoconazole 2% cream    Apply a thin layer to the affected area(s) on the face and ears two times per day. Continue to use as needed to control symptoms    This is not a steroid, and it can be used on the face.    Keep in mind to also regularly use moisturizer, as this preventative measure can help maintain your skin's natural moisture barrier.    Apply moisturizer after application of medication.    SHAMPOO INSTRUCTIONS  Continue alternating use of OTC anti-dandruff shampoos with different active ingredients every 4 week(s).   Coal Tar shampoos : Neutrogena T/Gel, Denorex, DHS Tar, Ionil-T, Tegrin, X-Seb T, Zetar  Zinc Pyrithione shampoos : Head & Shoulders, Denorex Advance Formula, DHS Zinc, Zincon  Salicylic Acid shampoos : Neutrogena T/Sal, DHS Sal, Ionil, P&S, Sebulex, X-Seb  Selenium Sulfide shampoos : Selsun Blue shampoo  Sulfur shampoos : Sebulex    SUN PROTECTION INSTRUCTIONS  Sun damage can lead to skin cancer and premature aging of the skin.      The best way to protect from sun damage to your skin is to avoid the sun during peak hours (10 am - 2 pm) even on overcast days.      Use UPF sun-protective clothing, which while more expensive initially provides longer lasting  "coverage without having to worry about remembering to re-apply.  1. Wear a wide-brimmed hat and sunglasses.   2. Wear sun-protective clothing.  [employer] and other [employer] make sun protective clothing that are stylish, comfortable and cool. [employer] and other companies make UV arm sleeves suitable for golfing, gardening and other activities.      Sunscreen instructions:  1. Use sunscreens with Zinc Oxide, Titanium Dioxide or Avobenzone to protect from UVA rays.  2. Use SPF 30-50+ to protect from UVB rays.  3. Re-apply every 2 hours even if water resistant.  4. Apply on your face every day even when cloudy and even in the winter. UVA \"aging rays\" penetrate window glass and are just as strong in the winter as in the summer.    Product Recommendations:    Good examples include: Blue Lizard, EltaMD, Solbar    Good daily moisturizers with SPF: Vanicream, CeraVe.    For sensitive skin, consider : SkinMedica Essential Defense Mineral Shield Broad Spectrum SPF 35      Never use tanning beds. Tanning beds are associated with much higher risks of skin cancer.    All tanning damages the skin. Aim for ivory skin year round and you will have less trouble with your skin in years to come. There is no merit in getting \"a base tan\" before a warm weather vacation, as any tanning indicates your body's response to sun damage.    Stop smoking. Smokers have higher rates of skin cancer and also have premature skin wrinkling.    FYI  You should use about 3 tablespoons of sunscreen to protect your whole body. Thus a typical eight ounce bottle of sunscreen should last 4 applications. Remember, that the SPF rating is compromised if you don t apply enough. Most people only apply 1/2 - 1/3 of the amount they need. Also don t forget areas such as your ears, feet, upper back and harder to reach places. Keep in mind that these amounts should be increased for larger body sizes.    Sunscreens with titanium dioxide and/or zinc oxide " in the active ingredients are physical blockers as opposed to chemical blockers. Chemical-free sunscreens should not irritate the skin.    Spray-on sunscreens may be used for touch-up application only, not as a base layer. Also, use with caution around small children due to inhalation risk.    Avoid retinyl palmitate products.    Avoid combination products that include both sunscreen and insect repellant, as sunscreen should be applied every 2 hours, but insect repellant should not be applied as frequently.    SPF means sun protection factor, which is just the degree to which the sunscreen can protect against UVB rays. There is no rating system for UVA rays. SPF is calculated as the time skin will burn when sunscreen is applied vs. skin without sunscreen.    Water resistant sunscreens should be re-applied every 1-2 hours.    For more information:  http://www.skincancer.org/prevention/sun-protection/sunscreen/sunscreens-safe-and-effective    The patient was counseled about sunscreens and sun avoidance. The patient was counseled to check the skin regularly and report any lesion that is new, changing, itching, scabbing, bleeding or otherwise bothersome. The patient was discharged ambulatory and in stable condition.        PROCEDURES:                                                    Name : Shave Excision  Indication : Excision of tissue for pathology evaluation.  Location(s) : Buttocks, left upper outer quadrant : 4 mm x 3 mm in size, irregularly pigmented brown macule. ? Atypical Nevus ? Other.  Completed by : Deborah Corcoran MD  Photo Taken : no.  Anesthesia : Patient was anesthetized by infiltrating the area surrounding the lesion with 1% lidocaine.   epinephrine [id number] : Yes.  Buffered with bicarbonate : Yes.  Note : Discussed the risk of pain, infection, scarring, hypo- or hyperpigmentation and recurrence or need for re-treatment. The benefits of treatment and alternative treatments were also discussed.    During  this procedure, the universal protocol was utilized. The patient's identity was confirmed by no less than two patient identifiers, correct procedure was verified, correct site was verified and marked as applicable and a final pause was completed.    Sterile technique was used throughout the procedure. The skin was cleaned and prepped with surgical cleanser. Once adequate anesthesia was obtained, the lesion was removed with a deep scallop shave procedure. The specimen was sent to pathology.    Direct pressure and monsels's and monopolar cautery was applied for hemostasis. No bleeding was present upon the completion of the procedure. The wound was coated with antibacterial ointment. A dry sterile dressing was applied. Patient tolerated the procedure well and left in satisfactory condition.    Primary provider and referring provider will be informed regarding the tissue report when it returns.        The information in this document, created by the medical scribe for me, accurately reflects the services I personally performed and the decisions made by me. I have reviewed and approved this document for accuracy prior to leaving the patient care area.  Deborah Corcoran MD November 3, 2017 2:42 PM  [facility] - PRIMARY CARE SKIN    Again, thank you for allowing me to participate in the care of your patient.        Sincerely,        Ifeoma Corcoran MD

## 2017-11-03 NOTE — MR AVS SNAPSHOT
After Visit Summary   11/3/2017    Harley Alvarado    MRN: [id number]           Patient Information     Date Of Birth          1955        Visit Information        Provider Department      11/3/2017 2:40 PM Ifeoma Corcoran MD [facility] Skin        Today's Diagnoses     Skin cancer screening    -  1    Neoplasm of uncertain behavior of skin        History of dysplastic nevus        Seborrheic dermatitis        Solar lentigo        Multiple benign melanocytic nevi        Skin tag          Care Instructions    FUTURE APPOINTMENTS    Follow up for a 40 minute wide excision for dysplastic nevus on the left shoulder.    Follow up every 1 year(s) for a full-body skin cancer screening.    Follow up per pathology report.    TOPICAL MEDICATION INSTRUCTIONS  Ketoconazole 2% cream    Apply a thin layer to the affected area(s) on the face and ears two times per day. Continue to use as needed to control symptoms    This is not a steroid, and it can be used on the face.    Keep in mind to also regularly use moisturizer, as this preventative measure can help maintain your skin's natural moisture barrier.    Apply moisturizer after application of medication.    SHAMPOO INSTRUCTIONS  Continue alternating use of OTC anti-dandruff shampoos with different active ingredients every 4 week(s).   Coal Tar shampoos : Neutrogena T/Gel, Denorex, DHS Tar, Ionil-T, Tegrin, X-Seb T, Zetar  Zinc Pyrithione shampoos : Head & Shoulders, Denorex Advance Formula, DHS Zinc, Zincon  Salicylic Acid shampoos : Neutrogena T/Sal, DHS Sal, Ionil, P&S, Sebulex, X-Seb  Selenium Sulfide shampoos : Selsun Blue shampoo  Sulfur shampoos : Sebulex    WOUND CARE INSTRUCTIONS  1. After 24 hours, change dressing daily.  2. Wash hands before every dressing change.  3. Wash the wound area with a mild soap, then rinse  4. Gently pat dry with a sterile gauze or Q-tip.  5. Apply Vaseline or Aquaphor only over entire  "wound. Do NOT use Neosporin - as many people react to neomycin.  6. Finally, cover with a bandage or sterile non-stick gauze with micropore paper tape.  7. Repeat once daily until wound has healed.    Do not let the wound dry out.  The wound will heal faster and with better cosmetic results if routinely kept moist with step #5. It is a false belief that a wound heals better when it is exposed to air and allowed to dry out.      Soap, water and shampoo will not hurt this area.    Do not go swimming or take baths, but showering is encouraged.    Limit use of the area where the procedure was done for a few days to allow for optimal healing.    If you experience bleeding:  Repeat steps #2-5 and hold firm pressure on the area for 10 minutes without checking to see if the bleeding has stopped. \"Checking\" pulls off the protective wound clot and restarts the bleeding all over again. Re-apply pressure for 10 minutes if necessary to stop bleeding.  Use additional sterile gauze and tape to maintain pressure once bleeding has stopped.    Signs of Infection:  Infection can occur in any area where skin has been disrupted.  If you notice persistent redness, swelling, colored drainage, increasing pain, fever or other signs of infection, please call us at: [phone] and ask to have me or my colleague paged. We will call you back to discuss.    Pathology Results:  You will be notified, generally via letter or MyChart, in approximately 10 days. If there is anything we need to discuss or further treatment needed, I will call you to discuss it.    Skin tissue can be some of the most challenging tissue for pathologists to examine, therefore we may use a specialist outside the [facility] system to read certain submitted tissue samples. When submitted to these outside specialists, [employer] will notify you that your tissue sample result has returned. However, this only means that the tissue sample has been sent to the specialist. These " results are not available in [employer], so I will contact you when I receive the final report.    PATIENT INFORMATION : WOUNDS  During the healing process you will notice a number of changes. All wounds develop a small halo of redness surrounding the wound.  This means healing is occurring. Severe itching with extensive redness usually indicates sensitivity to the ointment or bandage tape used to dress the wound.  You should call our office if this develops.      Swelling  and/or discoloration around your surgical site is common, particularly when performed around the eye.    All wounds normally drain.  The larger the wound the more drainage there will be.  After 7-10 days, you will notice the wound beginning to shrink and new skin will begin to grow.  The wound is healed when you can see skin has formed over the entire area.  A healed wound has a healthy, shiny look to the surface and is red to dark pink in color to normalize.  Wounds may take approximately 4-6 weeks to heal.  Larger wounds may take 6-8 weeks. After the wound is healed you may discontinue dressing changes.    You may experience a sensation of tightness as your wound heals. This is normal and will gradually subside.    Your healed wound may be sensitive to temperature changes. This sensitivity improves with time, but if you re having a lot of discomfort, try to avoid temperature extremes.    Patients frequently experience itching after their wound appears to have healed because of the continue healing under the skin.  Plain Vaseline will help relieve the itching.    SUN PROTECTION INSTRUCTIONS  Sun damage can lead to skin cancer and premature aging of the skin.      The best way to protect from sun damage to your skin is to avoid the sun during peak hours (10 am - 2 pm) even on overcast days.      Use UPF sun-protective clothing, which while more expensive initially provides longer lasting coverage without having to worry about remembering to  "re-apply.  1. Wear a wide-brimmed hat and sunglasses.   2. Wear sun-protective clothing.  [employer] and other [employer] make sun protective clothing that are stylish, comfortable and cool. [employer] and other [employer] make UV arm sleeves suitable for golfing, gardening and other activities.      Sunscreen instructions:  1. Use sunscreens with Zinc Oxide, Titanium Dioxide or Avobenzone to protect from UVA rays.  2. Use SPF 30-50+ to protect from UVB rays.  3. Re-apply every 2 hours even if water resistant.  4. Apply on your face every day even when cloudy and even in the winter. UVA \"aging rays\" penetrate window glass and are just as strong in the winter as in the summer.    Product Recommendations:    Good examples include: Blue Lizard, EltaMD, Solbar    Good daily moisturizers with SPF: Vanicream, CeraVe.    For sensitive skin, consider : SkinMedica Essential Defense Mineral Shield Broad Spectrum SPF 35      Never use tanning beds. Tanning beds are associated with much higher risks of skin cancer.    All tanning damages the skin. Aim for ivory skin year round and you will have less trouble with your skin in years to come. There is no merit in getting \"a base tan\" before a warm weather vacation, as any tanning indicates your body's response to sun damage.    Stop smoking. Smokers have higher rates of skin cancer and also have premature skin wrinkling.    FYI  You should use about 3 tablespoons of sunscreen to protect your whole body. Thus a typical eight ounce bottle of sunscreen should last 4 applications. Remember, that the SPF rating is compromised if you don t apply enough. Most people only apply 1/2 - 1/3 of the amount they need. Also don t forget areas such as your ears, feet, upper back and harder to reach places. Keep in mind that these amounts should be increased for larger body sizes.    Sunscreens with titanium dioxide and/or zinc oxide in the active ingredients are physical blockers as " opposed to chemical blockers. Chemical-free sunscreens should not irritate the skin.    Spray-on sunscreens may be used for touch-up application only, not as a base layer. Also, use with caution around small children due to inhalation risk.    Avoid retinyl palmitate products.    Avoid combination products that include both sunscreen and insect repellant, as sunscreen should be applied every 2 hours, but insect repellant should not be applied as frequently.    SPF means sun protection factor, which is just the degree to which the sunscreen can protect against UVB rays. There is no rating system for UVA rays. SPF is calculated as the time skin will burn when sunscreen is applied vs. skin without sunscreen.    Water resistant sunscreens should be re-applied every 1-2 hours.    For more information:  http://www.skincancer.org/prevention/sun-protection/sunscreen/sunscreens-safe-and-effective          Follow-ups after your visit        Your next 10 appointments already scheduled     Nov 07, 2017 11:45 AM CST   Anticoagulation Visit with [facility]   [facility] ([facility]    [address] [place] [id number]   [phone]            Nov 28, 2017  1:00 PM CST   HST  with  SLEEP LAB   [facility] ([facility])    [address] [place] [address]   [phone]            Nov 29, 2017 11:45 AM CST   HST Drop Off with [facility]   [facility] ([facility])    [address] [place] [address]   [phone]            Dec 04, 2017  2:00 PM CST   Return Sleep Patient with Lyndon Castañeda MD   [facility] ([facility])    [address] [place] [id number]   [phone]            Dec 14, 2017   Procedure with Cirilo Alexander MD  "  [facility] ([facility])    [address] [facility] [id number] [phone]           [facility] is located at [address] [place]              Who to contact     If you have questions or need follow up information about today's clinic visit or your schedule please contact [facility] - [facility] directly at [phone].  Normal or non-critical lab and imaging results will be communicated to you by [employer], letter or phone within 4 business days after the clinic has received the results. If you do not hear from us within 7 days, please contact the clinic through Anafocushart or phone. If you have a critical or abnormal lab result, we will notify you by phone as soon as possible.  Submit refill requests through [employer] or call your pharmacy and they will forward the refill request to us. Please allow 3 business days for your refill to be completed.          Additional Information About Your Visit        [employer] Information     [employer] lets you send messages to your doctor, view your test results, renew your prescriptions, schedule appointments and more. To sign up, go to [email or web address] . Click on \"Log in\" on the left side of the screen, which will take you to the Welcome page. Then click on \"Sign up Now\" on the right side of the page.     You will be asked to enter the access code listed below, as well as some personal information. Please follow the directions to create your username and password.     Your access code is: [email or web address]  Expires: 2017  2:04 PM     Your access code will  in 90 days. If you need help or a new code, please call your [place] clinic or [phone].        Care EveryWhere ID     This is your Care EveryWhere ID. This could be used by other organizations to access your [place] medical records  [id number]         Blood Pressure from Last 3 Encounters:   10/24/17 106/70   10/06/17 114/78 "   10/02/17 113/73    Weight from Last 3 Encounters:   10/24/17 154 lb (69.9 kg)   10/06/17 154 lb (69.9 kg)   10/02/17 152 lb (68.9 kg)              Today, you had the following     No orders found for display       Primary Care Provider Office Phone # Fax #    Cory Waldemar Acosta PA-C [phone] [address] [facility] [address]        Equal Access to Services     TITUS LEE : Hadii aad ku hadasho Soomaali, waaxda luqadaha, qaybta kaalmada adeegyada, waxay idiin hayaan adeeg kharash ladanish . So [facility] [phone].    ATENCIÓN: Si habla español, tiene a dubon disposición servicios gratuitos de asistencia lingüística. [facility] [phone].    We comply with applicable federal civil rights laws and [place] laws. We do not discriminate on the basis of race, color, national origin, age, disability, sex, sexual orientation, or gender identity.            Thank you!     Thank you for choosing [facility]  for your care. Our goal is always to provide you with excellent care. Hearing back from our patients is one way we can continue to improve our services. Please take a few minutes to complete the written survey that you may receive in the mail after your visit with us. Thank you!             Your Updated Medication List - Protect others around you: Learn how to safely use, store and throw away your medicines at www.disposemymeds.org.          This list is accurate as of: 11/3/17  3:12 PM.  Always use your most recent med list.                   Brand Name Dispense Instructions for use Diagnosis    lisinopril 20 MG tablet    PRINIVIL/ZESTRIL    90 tablet    Take 1 tablet (20 mg) by mouth daily    Membranous glomerulonephritis, Benign essential hypertension       olopatadine 0.1 % ophthalmic solution    PATANOL     INT 1 GTT IN OU BID        pravastatin 40 MG tablet    PRAVACHOL    90 tablet    TK 1 T PO QHS    Dyslipidemia       sulfamethoxazole-trimethoprim 800-160 MG  per tablet    BACTRIM DS/SEPTRA DS    20 tablet    Take 1 tablet by mouth 2 times daily    Postoperative wound infection, initial encounter       valACYclovir 500 MG tablet    VALTREX     TAKE ONE TABLET PO DAILY FOR HERPES/SHINGLES WHILE ON CYCLOSPORIN        warfarin 5 MG tablet    COUMADIN

## 2017-11-06 ENCOUNTER — COMMUNICATION - HEALTHEAST (OUTPATIENT)
Dept: FAMILY MEDICINE | Facility: CLINIC | Age: 62
End: 2017-11-06

## 2017-11-06 DIAGNOSIS — I26.99 PULMONARY EMBOLISM (H): ICD-10-CM

## 2017-11-07 ENCOUNTER — ANTICOAGULATION THERAPY VISIT (OUTPATIENT)
Dept: NURSING | Facility: CLINIC | Age: 62
End: 2017-11-07
Payer: COMMERCIAL

## 2017-11-07 ENCOUNTER — TELEPHONE (OUTPATIENT)
Dept: NURSING | Facility: CLINIC | Age: 62
End: 2017-11-07

## 2017-11-07 ENCOUNTER — TELEPHONE (OUTPATIENT)
Dept: FAMILY MEDICINE | Facility: CLINIC | Age: 62
End: 2017-11-07

## 2017-11-07 DIAGNOSIS — I26.99 PULMONARY EMBOLISM (H): ICD-10-CM

## 2017-11-07 DIAGNOSIS — Z79.01 LONG-TERM (CURRENT) USE OF ANTICOAGULANTS: ICD-10-CM

## 2017-11-07 LAB — INR POINT OF CARE: 2 (ref 0.86–1.14)

## 2017-11-07 PROCEDURE — 85610 PROTHROMBIN TIME: CPT | Mod: QW

## 2017-11-07 PROCEDURE — 99207 ZZC NO CHARGE NURSE ONLY: CPT

## 2017-11-07 PROCEDURE — 36416 COLLJ CAPILLARY BLOOD SPEC: CPT

## 2017-11-07 NOTE — PROGRESS NOTES
"  ANTICOAGULATION FOLLOW-UP CLINIC VISIT    Patient Name:  Harley Alvarado  Date:  11/7/2017  Contact Type:  Face to Face    SUBJECTIVE:     Patient Findings     Positives Change in diet/appetite (Pt states he ate a very large kale salad yesterday, he will hold off on greens today.  I encouraged consistency and small portion sizes for greens.), Dental/Other procedures (Pt is scheduled for colonoscopy 12/14/17, I will ask PCP if pt needs to bridge with Lovenox.), No Problem Findings    Comments Patient inquired as to why he is still on Warfarin.  Patient is new to [facility] so I informed him we should refer him to Heme.  Patient stated he only had 1 clot event, a PE about 5 years ago and that he was advised, possibly by his nephrologist, to continue on Warfarin for as long as he took Cyclosporin.  Patient states he has not taken Cyclosporin for \"a long time\".  I will send a message to PCP about Heme referral.           OBJECTIVE    INR Protime   Date Value Ref Range Status   11/07/2017 2.0 (A) 0.86 - 1.14 Final       ASSESSMENT / PLAN  No question data found.  Anticoagulation Summary as of 11/7/2017     INR goal 2.0-3.0   Today's INR 2.0   Maintenance plan 5 mg (5 mg x 1) on Sun; 7.5 mg (5 mg x 1.5) all other days   Full instructions 5 mg on Sun; 7.5 mg all other days   Weekly total 50 mg   No change documented Azul Garber RN   Plan last modified Azul Garber RN (10/10/2017)   Next INR check 12/5/2017   Target end date Indefinite    Indications   Long-term (current) use of anticoagulants [Z79.01] [Z79.01]  Pulmonary embolism (H) [I26.99]         Anticoagulation Episode Summary     INR check location     Preferred lab     Send INR reminders to  [facility]    Comments       Anticoagulation Care Providers     Provider Role Specialty Phone number    Cory Acosta PA-C Responsible Physician Assistant [phone]            See the Encounter Report to view Anticoagulation Flowsheet and Dosing Calendar (Go " to Encounters tab in chart review, and find the Anticoagulation Therapy Visit)        Azul Garber RN

## 2017-11-07 NOTE — PROGRESS NOTES
ADDENDUM:                                                    November 7, 2017 3:19 PM  Pathology report results:

## 2017-11-07 NOTE — TELEPHONE ENCOUNTER
SHELIA to call INR clinic.  I was able to review scanned in [facility] notes and patient is to be on long-term Warfarin due to nephropathy.  Patient had inquired why he has to be on Warfarin long-term.    Azul Garber RN

## 2017-11-07 NOTE — TELEPHONE ENCOUNTER
Left message on answering machine for patient to call back.  Edwige Kumar RN  [facility]  [phone]      The lesion that was removed from the left buttocks was benign (not cancer) and is called a mild atypical nevus.   A nevus with atypical changes has a small potential to evolve into a melanoma; therefore, we usually recommend completely removing the atypical nevus. Your atypical nevus was completely removed. If any pigmentation should recur then it should be evaluated.     I would recommend annual full-body skin exam.

## 2017-11-07 NOTE — MR AVS SNAPSHOT
Harley Alvarado   11/7/2017 11:45 AM   Anticoagulation Therapy Visit    Description:  61 year old male   Provider:  [facility] ANTICOAGULATION [facility]INIC   Department:  Cr Nurse           INR as of 11/7/2017     Today's INR 2.0      Anticoagulation Summary as of 11/7/2017     INR goal 2.0-3.0   Today's INR 2.0   Full instructions 5 mg on Sun; 7.5 mg all other days   Next INR check 12/5/2017    Indications   Long-term (current) use of anticoagulants [Z79.01] [Z79.01]  Pulmonary embolism (H) [I26.99]         Your next Anticoagulation Clinic appointment(s)     Dec 05, 2017 11:15 AM CST   Anticoagulation Visit with [facility] CL[facility]IC   [facility]    [address] [place] [id number] [phone]              Contact Numbers     Clinic Number:         November 2017 Details    Sun Mon Tue Wed Thu Fri Sat        1               2               3               4                 5               6               7      7.5 mg   See details      8      7.5 mg         9      7.5 mg         10      7.5 mg         11      7.5 mg           12      5 mg         13      7.5 mg         14      7.5 mg         15      7.5 mg         16      7.5 mg         17      7.5 mg         18      7.5 mg           19      5 mg         20      7.5 mg         21      7.5 mg         22      7.5 mg         23      7.5 mg         24      7.5 mg         25      7.5 mg           26      5 mg         27      7.5 mg         28      7.5 mg         29      7.5 mg         30      7.5 mg            Date Details   11/07 This INR check               How to take your warfarin dose     To take:  5 mg Take 1 of the 5 mg tablets.    To take:  7.5 mg Take 1.5 of the 5 mg tablets.           December 2017 Details    Sun Mon Tue Wed Thu Fri Sat          1      7.5 mg         2      7.5 mg           3      5 mg         4      7.5 mg         5            6               7               8                9                 10               11               12               13               14               15               16                 17               18               19               20               21               22               23                 24               25               26               27               28               29               30                 31                      Date Details   No additional details    Date of next INR:  12/5/2017         How to take your warfarin dose     To take:  5 mg Take 1 of the 5 mg tablets.    To take:  7.5 mg Take 1.5 of the 5 mg tablets.

## 2017-11-09 NOTE — TELEPHONE ENCOUNTER
Patient notified of test results and PCP's message, no further questions.  Edwige Kumar RN  [facility]  [phone]

## 2017-11-14 ENCOUNTER — COMMUNICATION - HEALTHEAST (OUTPATIENT)
Dept: INTERNAL MEDICINE | Facility: CLINIC | Age: 62
End: 2017-11-14

## 2017-11-14 DIAGNOSIS — N28.9 NEPHROPATHY: ICD-10-CM

## 2017-11-28 ENCOUNTER — OFFICE VISIT (OUTPATIENT)
Dept: SLEEP MEDICINE | Facility: CLINIC | Age: 62
End: 2017-11-28

## 2017-11-28 DIAGNOSIS — R06.83 SNORING: ICD-10-CM

## 2017-11-28 DIAGNOSIS — G47.9 SLEEP DISTURBANCE: ICD-10-CM

## 2017-11-28 NOTE — MR AVS SNAPSHOT
After Visit Summary   11/28/2017    Harley Alvarado    MRN: [id number]           Patient Information     Date Of Birth          1955        Visit Information        Provider Department      11/28/2017 1:00 PM [facility]        Today's Diagnoses     Snoring        Sleep disturbance           Follow-ups after your visit        Your next 10 appointments already scheduled     Nov 29, 2017 11:45 AM CST   HST Drop Off with [facility]   [facility] [place]    [address]  [facility] [address]   [phone]            Nov 29, 2017  2:20 PM CST   Office Visit with Ifeoma Corcoran MD   [facility] ([facility] )    [address] [place] [address]  [address]  [facility] [id number] [phone]           Bring a current list of meds and any records pertaining to this visit. For Physicals, please bring immunization records and any forms needing to be filled out. Please arrive 10 minutes early to complete paperwork.            Dec 04, 2017  2:00 PM CST   Return Sleep Patient with Lyndon Castañeda MD   [facility] [place]    [address]  [facility] [address]   [phone]            Dec 05, 2017 11:15 AM CST   Anticoagulation Visit with [facility]   [facility]    [address] [place] [id number] [phone]            Dec 14, 2017   Procedure with Cirilo Alexander MD   [facility] ([facility])    [address]  [facility] [address]   [phone]           [facility] is located at [address] [facility]. [place]              Who to contact     If you have questions or need follow up information about today's clinic visit or  your schedule please contact [facility] directly at [phone].  Normal or non-critical lab and imaging results will be communicated to you by MyChart, letter or phone within 4 business days after the clinic has received the results. If you do not hear from us within 7 days, please contact the clinic through [employer] In[employer] or phone. If you have a critical or abnormal lab result, we will notify you by phone as soon as possible.  Submit refill requests through [employer] or call your pharmacy and they will forward the refill request to us. Please allow 3 business days for your refill to be completed.          Additional Information About Your Visit        [employer] Information     [employer] gives you secure access to your electronic health record. If you see a primary care provider, you can also send messages to your care team and make appointments. If you have questions, please call your primary care clinic.  If you do not have a primary care provider, please call [phone] and they will assist you.        Care EveryWhere ID     This is your Care EveryWhere ID. This could be used by other organizations to access your [facility] medical records  [id number]         Blood Pressure from Last 3 Encounters:   10/24/17 106/70   10/06/17 114/78   10/02/17 113/73    Weight from Last 3 Encounters:   10/24/17 69.9 kg (154 lb)   10/06/17 69.9 kg (154 lb)   10/02/17 68.9 kg (152 lb)              We Performed the Following     HST-Home Sleep Apnea Test        Primary Care Provider Office Phone # Fax #    Cory Waldemar Acosta PA-C [phone]       [address] [facility] [address]        Equal Access to Services     JAMES LEE AH: Hadii arlette berkowitz Soayden, waaxda luqadaha, qaybta kaalmada essie diaz. So [facility] [phone].    ATENCIÓN: Si habla español, tiene a dubon disposición servicios gratuitos de asistencia lingüística. Aide quiroga [phone].    We  comply with applicable federal civil rights laws and [place] laws. We do not discriminate on the basis of race, color, national origin, age, disability, sex, sexual orientation, or gender identity.            Thank you!     Thank you for choosing [facility]  for your [place]. Our goal is always to provide you with excellent care. Hearing back from our patients is one way we can continue to improve our services. Please take a few minutes to complete the written survey that you may receive in the mail after your visit with us. Thank you!             Your Updated Medication List - Protect others around you: Learn how to safely use, store and throw away your medicines at www.disposemymeds.org.          This list is accurate as of: 11/28/17  6:26 PM.  Always use your most recent med list.                   Brand Name Dispense Instructions for use Diagnosis    ketoconazole 2 % cream    NIZORAL    30 g    Apply topically 2 times daily    Seborrheic dermatitis       lisinopril 20 MG tablet    PRINIVIL/ZESTRIL    90 tablet    Take 1 tablet (20 mg) by mouth daily    Membranous glomerulonephritis, Benign essential hypertension       olopatadine 0.1 % ophthalmic solution    PATANOL     INT 1 GTT IN OU BID        pravastatin 40 MG tablet    PRAVACHOL    90 tablet    TK 1 T PO QHS    Dyslipidemia       sulfamethoxazole-trimethoprim 800-160 MG per tablet    BACTRIM DS/SEPTRA DS    20 tablet    Take 1 tablet by mouth 2 times daily    Postoperative wound infection, initial encounter       valACYclovir 500 MG tablet    VALTREX     TAKE ONE TABLET PO DAILY FOR HERPES/SHINGLES WHILE ON CYCLOSPORIN        warfarin 5 MG tablet    COUMADIN

## 2017-11-29 ENCOUNTER — OFFICE VISIT (OUTPATIENT)
Dept: FAMILY MEDICINE | Facility: CLINIC | Age: 62
End: 2017-11-29
Payer: COMMERCIAL

## 2017-11-29 DIAGNOSIS — D23.62 DYSPLASTIC NEVUS OF LEFT UPPER EXTREMITY: Primary | ICD-10-CM

## 2017-11-29 DIAGNOSIS — Z86.018 HISTORY OF DYSPLASTIC NEVUS: ICD-10-CM

## 2017-11-29 PROCEDURE — 11402 EXC TR-EXT B9+MARG 1.1-2 CM: CPT | Mod: 51 | Performed by: FAMILY MEDICINE

## 2017-11-29 PROCEDURE — 88305 TISSUE EXAM BY PATHOLOGIST: CPT | Performed by: FAMILY MEDICINE

## 2017-11-29 PROCEDURE — 99000 SPECIMEN HANDLING OFFICE-LAB: CPT | Performed by: FAMILY MEDICINE

## 2017-11-29 PROCEDURE — 12032 INTMD RPR S/A/T/EXT 2.6-7.5: CPT | Performed by: FAMILY MEDICINE

## 2017-11-29 NOTE — PROGRESS NOTES
Patient picked up Hst 11/28/17.  Demonstrated back for knowledge of use.  Will return Hst 11/29/17.

## 2017-11-29 NOTE — LETTER
11/29/2017         RE: Harley Alvarado  [address] [place] [address]        Dear Colleague,    Thank you for referring your patient, Harley Alvarado, to the [facility]. Please see a copy of my visit note below.    [facility]    CC : Wide excision   SUBJECTIVE:                                                    Harley Alvarado is a 61 year old male who presents to clinic today for follow-up wide excision of a mild dysplastic nevus(i) on the left shoulder.    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    Tissue Pathology Report from 10/2/2017      Personal history of skin cancer : NO - history of mild dysplastic nevi  Family history of skin cancer : NO.     Sun Exposure History  Previous history of significant sun exposure: Outdoor hobbies include fishing.  Blistering sunburns : YES - once on entire body  Tanning beds : NO.  Sunscreen Use : YES, frequency : when outdoors for extended durations.  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    ROS : 14 point review of systems was negative except the symptoms listed above in the HPI.    This document serves as a record of the services and decisions personally performed and made by Deborah Corcoran MD. It was created on her behalf by Juan Garcia, a trained medical scribe.  The creation of this document is based on the scribe's personal observations and the provider's statements to the medical scribe.  Juan Garcia, November 29, 2017 2:31 PM      OBJECTIVE:                                                    GENERAL: healthy, alert and no distress  SKIN: [place] Skin Type - II.  Neck, Trunk and Arms were examined. The dermatoscope was used to help  "evaluate pigmented lesions.  Skin Pertinent Findings:  Left shoulder : 4 mm x 3 mm in size well-healed scar with residual brown pigmentation.      ASSESSMENT:                                                      Encounter Diagnoses   Name Primary?     Dysplastic nevus of left upper extremity Yes     History of dysplastic nevus          PLAN:                                                    Patient Instructions   FUTURE APPOINTMENTS  Follow up in 14 days for Suture Removal, with the nurse at any [facility] clinic. If you go elsewhere, make sure to call ahead of time to get on their schedule.  Follow up in 6 months for a full-body skin cancer screening.    WOUND CARE INSTRUCTIONS  1. After 24 hours, change dressing daily.  2. Wash hands before every dressing change.  3. Wash the wound area with a mild soap, then rinse  4. Gently pat dry with a sterile gauze or Q-tip.  5. Apply Vaseline or Aquaphor only over entire wound. Do NOT use Neosporin - as many people react to neomycin.  6. Finally, cover with a bandage or sterile non-stick gauze with micropore paper tape.  7. Repeat once daily until wound has healed.    Do not let the wound dry out.  The wound will heal faster and with better cosmetic results if routinely kept moist with step #5. It is a false belief that a wound heals better when it is exposed to air and allowed to dry out.      Soap, water and shampoo will not hurt this area.    Do not go swimming or take baths, but showering is encouraged.    Limit use of the area where the procedure was done for a few days to allow for optimal healing.    If you experience bleeding:  Repeat steps #2-5 and hold firm pressure on the area for 10 minutes without checking to see if the bleeding has stopped. \"Checking\" pulls off the protective wound clot and restarts the bleeding all over again. Re-apply pressure for 10 minutes if necessary to stop bleeding.  Use additional sterile gauze and tape to maintain pressure once " bleeding has stopped.    Signs of Infection:  Infection can occur in any area where skin has been disrupted.  If you notice persistent redness, swelling, colored drainage, increasing pain, fever or other signs of infection, please call us at: [phone] and ask to have me or my colleague paged. We will call you back to discuss.    Pathology Results:  You will be notified, generally via letter or MyChart, in approximately 10 days. If there is anything we need to discuss or further treatment needed, I will call you to discuss it.    Skin tissue can be some of the most challenging tissue for pathologists to examine, therefore we may use a specialist outside the [facility] system to read certain submitted tissue samples. When submitted to these outside specialists, [employer] will notify you that your tissue sample result has returned. However, this only means that the tissue sample has been sent to the specialist. These results are not available in [facility], so I will contact you when I receive the final report.    PATIENT INFORMATION : WOUNDS  During the healing process you will notice a number of changes. All wounds develop a small halo of redness surrounding the wound.  This means healing is occurring. Severe itching with extensive redness usually indicates sensitivity to the ointment or bandage tape used to dress the wound.  You should call our office if this develops.      Swelling  and/or discoloration around your surgical site is common, particularly when performed around the eye.    All wounds normally drain.  The larger the wound the more drainage there will be.  After 7-10 days, you will notice the wound beginning to shrink and new skin will begin to grow.  The wound is healed when you can see skin has formed over the entire area.  A healed wound has a healthy, shiny look to the surface and is red to dark pink in color to normalize.  Wounds may take approximately 4-6 weeks to heal.  Larger wounds may take 6-8  "weeks. After the wound is healed you may discontinue dressing changes.    You may experience a sensation of tightness as your wound heals. This is normal and will gradually subside.    Your healed wound may be sensitive to temperature changes. This sensitivity improves with time, but if you re having a lot of discomfort, try to avoid temperature extremes.    Patients frequently experience itching after their wound appears to have healed because of the continue healing under the skin.  Plain Vaseline will help relieve the itching.    SUN PROTECTION INSTRUCTIONS  Sun damage can lead to skin cancer and premature aging of the skin.      The best way to protect from sun damage to your skin is to avoid the sun during peak hours (10 am - 2 pm) even on overcast days.      Use UPF sun-protective clothing, which while more expensive initially provides longer lasting coverage without having to worry about remembering to re-apply.  1. Wear a wide-brimmed hat and sunglasses.   2. Wear sun-protective clothing.  [employer] other [employer] make sun protective clothing that are stylish, comfortable and cool. [employer] and other [employer] make UV arm sleeves suitable for golfing, gardening and other activities.      Sunscreen instructions:  1. Use sunscreens with Zinc Oxide, Titanium Dioxide or Avobenzone to protect from UVA rays.  2. Use SPF 30-50+ to protect from UVB rays.  3. Re-apply every 2 hours even if water resistant.  4. Apply on your face every day even when cloudy and even in the winter. UVA \"aging rays\" penetrate window glass and are just as strong in the winter as in the summer.    Product Recommendations:    Good examples include: Dwaine Yadav, EltaMD, Solbar    Good daily moisturizers with SPF: Vanicream, CeraVe.    For sensitive skin, consider : [employer] Essential Defense Mineral Shield Broad Spectrum SPF 35      Never use tanning beds. Tanning beds are associated with much higher risks of skin " "cancer.    All tanning damages the skin. Aim for ivory skin year round and you will have less trouble with your skin in years to come. There is no merit in getting \"a base tan\" before a warm weather vacation, as any tanning indicates your body's response to sun damage.    Stop smoking. Smokers have higher rates of skin cancer and also have premature skin wrinkling.    FYI  You should use about 3 tablespoons of sunscreen to protect your whole body. Thus a typical eight ounce bottle of sunscreen should last 4 applications. Remember, that the SPF rating is compromised if you don t apply enough. Most people only apply 1/2 - 1/3 of the amount they need. Also don t forget areas such as your ears, feet, upper back and harder to reach places. Keep in mind that these amounts should be increased for larger body sizes.    Sunscreens with titanium dioxide and/or zinc oxide in the active ingredients are physical blockers as opposed to chemical blockers. Chemical-free sunscreens should not irritate the skin.    Spray-on sunscreens may be used for touch-up application only, not as a base layer. Also, use with caution around small children due to inhalation risk.    Avoid retinyl palmitate products.    Avoid combination products that include both sunscreen and insect repellant, as sunscreen should be applied every 2 hours, but insect repellant should not be applied as frequently.    SPF means sun protection factor, which is just the degree to which the sunscreen can protect against UVB rays. There is no rating system for UVA rays. SPF is calculated as the time skin will burn when sunscreen is applied vs. skin without sunscreen.    Water resistant sunscreens should be re-applied every 1-2 hours.    For more information:  http://www.skincancer.org/prevention/sun-protection/sunscreen/sunscreens-safe-and-effective        PROCEDURES:                                                    Name : Wide Excision  Indication : Wide excision of " tissue for pathology evaluation of dysplasia.  Location(s) : left shoulder.  Completed by : Deborah Corcoran MD  Photo Taken : no.  Anesthesia : Patient was anesthetized by infiltrating the area surrounding the lesion with 1% lidocaine.   epinephrine [id number] : Yes.  Buffered with bicarbonate : Yes.  Note : Prior to procedure we discussed expectations for healing, risk of infection, and scar formation. Discussed other treatment options available. Discussed the risk of pain, infection, scarring, hypo- or hyperpigmentation and recurrence or need for re-treatment. The benefits of treatment and alternative treatments were also discussed.    During this procedure, the universal protocol was utilized. The patient's identity was confirmed by no less than two patient identifiers, correct procedure was verified, correct site was verified and marked as applicable and a final pause was completed.    Sterile technique was used throughout the procedure. The skin was cleaned and prepped with Chloroprep. A 4 mm margin was marked out on each side of the lesion. Sterile drapes were laid out. Once adequate anesthesia was obtained, an elliptical incision was made with a #10 blade through the epidermis and dermis. The tissue specimen was placed in formalin and sent to pathology.    Direct pressure and monopolar cautery was applied for hemostasis. Edges were undermined with a Metzenbaum. Defect was approximated with 3-0 Vicryl and edges were approximated with 3-0 Prolene horizontal mattress and interrupted simple stitch. Minimal bleeding occurred. Dressing was applied and patient left in satisfactory condition.    Widest diameter : 1.2 cm.  Length : 4 cm.    Total number of stitches in closure of epidermis : 6    Primary provider and referring provider will be informed regarding the wound culture and tissue sample report when it returns.    Suture removal : 10-14 days.      The information in this document, created by the medical scribe  for me, accurately reflects the services I personally performed and the decisions made by me. I have reviewed and approved this document for accuracy prior to leaving the patient care area.  Deborah Corcoran MD November 29, 2017 2:31 PM  [facility] - [facility]    Again, thank you for allowing me to participate in the care of your patient.        Sincerely,        Ifeoma Corcoran MD

## 2017-11-29 NOTE — PATIENT INSTRUCTIONS
"FUTURE APPOINTMENTS  Follow up in 14 days for Suture Removal, with the nurse at any [facility]. If you go elsewhere, make sure to call ahead of time to get on their schedule.  Follow up in 6 months for a full-body skin cancer screening.    WOUND CARE INSTRUCTIONS  1. After 24 hours, change dressing daily.  2. Wash hands before every dressing change.  3. Wash the wound area with a mild soap, then rinse  4. Gently pat dry with a sterile gauze or Q-tip.  5. Apply Vaseline or Aquaphor only over entire wound. Do NOT use Neosporin - as many people react to neomycin.  6. Finally, cover with a bandage or sterile non-stick gauze with micropore paper tape.  7. Repeat once daily until wound has healed.    Do not let the wound dry out.  The wound will heal faster and with better cosmetic results if routinely kept moist with step #5. It is a false belief that a wound heals better when it is exposed to air and allowed to dry out.      Soap, water and shampoo will not hurt this area.    Do not go swimming or take baths, but showering is encouraged.    Limit use of the area where the procedure was done for a few days to allow for optimal healing.    If you experience bleeding:  Repeat steps #2-5 and hold firm pressure on the area for 10 minutes without checking to see if the bleeding has stopped. \"Checking\" pulls off the protective wound clot and restarts the bleeding all over again. Re-apply pressure for 10 minutes if necessary to stop bleeding.  Use additional sterile gauze and tape to maintain pressure once bleeding has stopped.    Signs of Infection:  Infection can occur in any area where skin has been disrupted.  If you notice persistent redness, swelling, colored drainage, increasing pain, fever or other signs of infection, please call us at: [phone] and ask to have me or my colleague paged. We will call you back to discuss.    Pathology Results:  You will be notified, generally via letter or MyChart, in " approximately 10 days. If there is anything we need to discuss or further treatment needed, I will call you to discuss it.    Skin tissue can be some of the most challenging tissue for pathologists to examine, therefore we may use a specialist outside the [employer] system to read certain submitted tissue samples. When submitted to these outside specialists, [employer] will notify you that your tissue sample result has returned. However, this only means that the tissue sample has been sent to the specialist. These results are not available in [employer], so I will contact you when I receive the final report.    PATIENT INFORMATION : WOUNDS  During the healing process you will notice a number of changes. All wounds develop a small halo of redness surrounding the wound.  This means healing is occurring. Severe itching with extensive redness usually indicates sensitivity to the ointment or bandage tape used to dress the wound.  You should call our office if this develops.      Swelling  and/or discoloration around your surgical site is common, particularly when performed around the eye.    All wounds normally drain.  The larger the wound the more drainage there will be.  After 7-10 days, you will notice the wound beginning to shrink and new skin will begin to grow.  The wound is healed when you can see skin has formed over the entire area.  A healed wound has a healthy, shiny look to the surface and is red to dark pink in color to normalize.  Wounds may take approximately 4-6 weeks to heal.  Larger wounds may take 6-8 weeks. After the wound is healed you may discontinue dressing changes.    You may experience a sensation of tightness as your wound heals. This is normal and will gradually subside.    Your healed wound may be sensitive to temperature changes. This sensitivity improves with time, but if you re having a lot of discomfort, try to avoid temperature extremes.    Patients frequently experience itching after their  "wound appears to have healed because of the continue healing under the skin.  Plain Vaseline will help relieve the itching.    SUN PROTECTION INSTRUCTIONS  Sun damage can lead to skin cancer and premature aging of the skin.      The best way to protect from sun damage to your skin is to avoid the sun during peak hours (10 am - 2 pm) even on overcast days.      Use UPF sun-protective clothing, which while more expensive initially provides longer lasting coverage without having to worry about remembering to re-apply.  1. Wear a wide-brimmed hat and sunglasses.   2. Wear sun-protective clothing.  [employer] and other [employer] make sun protective clothing that are stylish, comfortable and cool. [employer] and other [employer] make UV arm sleeves suitable for golfing, gardening and other activities.      Sunscreen instructions:  1. Use sunscreens with Zinc Oxide, Titanium Dioxide or Avobenzone to protect from UVA rays.  2. Use SPF 30-50+ to protect from UVB rays.  3. Re-apply every 2 hours even if water resistant.  4. Apply on your face every day even when cloudy and even in the winter. UVA \"aging rays\" penetrate window glass and are just as strong in the winter as in the summer.    Product Recommendations:    Good examples include: Blue Lizard, EltaMD, Solbar    Good daily moisturizers with SPF: Vanicream, CeraVe.    For sensitive skin, consider : SkinMedica Essential Defense Mineral Shield Broad Spectrum SPF 35      Never use tanning beds. Tanning beds are associated with much higher risks of skin cancer.    All tanning damages the skin. Aim for ivory skin year round and you will have less trouble with your skin in years to come. There is no merit in getting \"a base tan\" before a warm weather vacation, as any tanning indicates your body's response to sun damage.    Stop smoking. Smokers have higher rates of skin cancer and also have premature skin wrinkling.    FYI  You should use about 3 tablespoons of " sunscreen to protect your whole body. Thus a typical eight ounce bottle of sunscreen should last 4 applications. Remember, that the SPF rating is compromised if you don t apply enough. Most people only apply 1/2 - 1/3 of the amount they need. Also don t forget areas such as your ears, feet, upper back and harder to reach places. Keep in mind that these amounts should be increased for larger body sizes.    Sunscreens with titanium dioxide and/or zinc oxide in the active ingredients are physical blockers as opposed to chemical blockers. Chemical-free sunscreens should not irritate the skin.    Spray-on sunscreens may be used for touch-up application only, not as a base layer. Also, use with caution around small children due to inhalation risk.    Avoid retinyl palmitate products.    Avoid combination products that include both sunscreen and insect repellant, as sunscreen should be applied every 2 hours, but insect repellant should not be applied as frequently.    SPF means sun protection factor, which is just the degree to which the sunscreen can protect against UVB rays. There is no rating system for UVA rays. SPF is calculated as the time skin will burn when sunscreen is applied vs. skin without sunscreen.    Water resistant sunscreens should be re-applied every 1-2 hours.    For more information:  http://www.skincancer.org/prevention/sun-protection/sunscreen/sunscreens-safe-and-effective

## 2017-11-29 NOTE — MR AVS SNAPSHOT
"              After Visit Summary   11/29/2017    Harley Alvarado    MRN: [id number]           Patient Information     Date Of Birth          1955        Visit Information        Provider Department      11/29/2017 2:20 PM Ifeoma Corcoran MD [facility] - Primary Care Skin        Today's Diagnoses     Dysplastic nevus of left upper extremity    -  1    History of dysplastic nevus          Care Instructions    FUTURE APPOINTMENTS  Follow up in 14 days for Suture Removal, with the nurse at any [facility]. If you go elsewhere, make sure to call ahead of time to get on their schedule.  Follow up in 6 months for a full-body skin cancer screening.    WOUND CARE INSTRUCTIONS  1. After 24 hours, change dressing daily.  2. Wash hands before every dressing change.  3. Wash the wound area with a mild soap, then rinse  4. Gently pat dry with a sterile gauze or Q-tip.  5. Apply Vaseline or Aquaphor only over entire wound. Do NOT use Neosporin - as many people react to neomycin.  6. Finally, cover with a bandage or sterile non-stick gauze with micropore paper tape.  7. Repeat once daily until wound has healed.    Do not let the wound dry out.  The wound will heal faster and with better cosmetic results if routinely kept moist with step #5. It is a false belief that a wound heals better when it is exposed to air and allowed to dry out.      Soap, water and shampoo will not hurt this area.    Do not go swimming or take baths, but showering is encouraged.    Limit use of the area where the procedure was done for a few days to allow for optimal healing.    If you experience bleeding:  Repeat steps #2-5 and hold firm pressure on the area for 10 minutes without checking to see if the bleeding has stopped. \"Checking\" pulls off the protective wound clot and restarts the bleeding all over again. Re-apply pressure for 10 minutes if necessary to stop bleeding.  Use additional sterile gauze and tape to maintain " pressure once bleeding has stopped.    Signs of Infection:  Infection can occur in any area where skin has been disrupted.  If you notice persistent redness, swelling, colored drainage, increasing pain, fever or other signs of infection, please call us at: [phone] and ask to have me or my colleague paged. We will call you back to discuss.    Pathology Results:  You will be notified, generally via letter or MyChart, in approximately 10 days. If there is anything we need to discuss or further treatment needed, I will call you to discuss it.    Skin tissue can be some of the most challenging tissue for pathologists to examine, therefore we may use a specialist outside the [employer] system to read certain submitted tissue samples. When submitted to these outside specialists, [id number] will notify you that your tissue sample result has returned. However, this only means that the tissue sample has been sent to the specialist. These results are not available in [employer], so I will contact you when I receive the final report.    PATIENT INFORMATION : WOUNDS  During the healing process you will notice a number of changes. All wounds develop a small halo of redness surrounding the wound.  This means healing is occurring. Severe itching with extensive redness usually indicates sensitivity to the ointment or bandage tape used to dress the wound.  You should call our office if this develops.      Swelling  and/or discoloration around your surgical site is common, particularly when performed around the eye.    All wounds normally drain.  The larger the wound the more drainage there will be.  After 7-10 days, you will notice the wound beginning to shrink and new skin will begin to grow.  The wound is healed when you can see skin has formed over the entire area.  A healed wound has a healthy, shiny look to the surface and is red to dark pink in color to normalize.  Wounds may take approximately 4-6 weeks to heal.  Larger wounds  "may take 6-8 weeks. After the wound is healed you may discontinue dressing changes.    You may experience a sensation of tightness as your wound heals. This is normal and will gradually subside.    Your healed wound may be sensitive to temperature changes. This sensitivity improves with time, but if you re having a lot of discomfort, try to avoid temperature extremes.    Patients frequently experience itching after their wound appears to have healed because of the continue healing under the skin.  Plain Vaseline will help relieve the itching.    SUN PROTECTION INSTRUCTIONS  Sun damage can lead to skin cancer and premature aging of the skin.      The best way to protect from sun damage to your skin is to avoid the sun during peak hours (10 am - 2 pm) even on overcast days.      Use UPF sun-protective clothing, which while more expensive initially provides longer lasting coverage without having to worry about remembering to re-apply.  1. Wear a wide-brimmed hat and sunglasses.   2. Wear sun-protective clothing.  [facility] and other [employer] make sun protective clothing that are stylish, comfortable and cool. [employer] and other [employer] make UV arm sleeves suitable for golfing, gardening and other activities.      Sunscreen instructions:  1. Use sunscreens with Zinc Oxide, Titanium Dioxide or Avobenzone to protect from UVA rays.  2. Use SPF 30-50+ to protect from UVB rays.  3. Re-apply every 2 hours even if water resistant.  4. Apply on your face every day even when cloudy and even in the winter. UVA \"aging rays\" penetrate window glass and are just as strong in the winter as in the summer.    Product Recommendations:    Good examples include: Dwaine Yadav, EltaMD, Solbar    Good daily moisturizers with SPF: Vanicream, CeraVe.    For sensitive skin, consider : SkinMedica Essential Defense Mineral Shield Broad Spectrum SPF 35      Never use tanning beds. Tanning beds are associated with much higher risks " "of skin cancer.    All tanning damages the skin. Aim for ivory skin year round and you will have less trouble with your skin in years to come. There is no merit in getting \"a base tan\" before a warm weather vacation, as any tanning indicates your body's response to sun damage.    Stop smoking. Smokers have higher rates of skin cancer and also have premature skin wrinkling.    FYI  You should use about 3 tablespoons of sunscreen to protect your whole body. Thus a typical eight ounce bottle of sunscreen should last 4 applications. Remember, that the SPF rating is compromised if you don t apply enough. Most people only apply 1/2 - 1/3 of the amount they need. Also don t forget areas such as your ears, feet, upper back and harder to reach places. Keep in mind that these amounts should be increased for larger body sizes.    Sunscreens with titanium dioxide and/or zinc oxide in the active ingredients are physical blockers as opposed to chemical blockers. Chemical-free sunscreens should not irritate the skin.    Spray-on sunscreens may be used for touch-up application only, not as a base layer. Also, use with caution around small children due to inhalation risk.    Avoid retinyl palmitate products.    Avoid combination products that include both sunscreen and insect repellant, as sunscreen should be applied every 2 hours, but insect repellant should not be applied as frequently.    SPF means sun protection factor, which is just the degree to which the sunscreen can protect against UVB rays. There is no rating system for UVA rays. SPF is calculated as the time skin will burn when sunscreen is applied vs. skin without sunscreen.    Water resistant sunscreens should be re-applied every 1-2 hours.    For more information:  http://www.skincancer.org/prevention/sun-protection/sunscreen/sunscreens-safe-and-effective          Follow-ups after your visit        Your next 10 appointments already scheduled     Dec 04, 2017  2:00 PM " CST   Return Sleep Patient with Lyndon Castañeda MD   [facility] ([facility])    [address]  [place] [address]   [phone]            Dec 05, 2017 11:15 AM CST   Anticoagulation Visit with [facility]   [facility]    [address] [id number] [phone]            Dec 14, 2017   Procedure with Cirilo Alexander MD   [facility] ([facility])    [address]  [place] [address] [phone]           [facility] is located at [address] [facility]. [place]              Who to contact     If you have questions or need follow up information about today's clinic visit or your schedule please contact [facility] - PRIMARY CARE SKIN directly at [phone].  Normal or non-critical lab and imaging results will be communicated to you by [employer], letter or phone within 4 business days after the clinic has received the results. If you do not hear from us within 7 days, please contact the clinic through [employer] or phone. If you have a critical or abnormal lab result, we will notify you by phone as soon as possible.  Submit refill requests through [employer] or call your pharmacy and they will forward the refill request to us. Please allow 3 business days for your refill to be completed.          Additional Information About Your Visit        [employer] Information     [employer] gives you secure access to your electronic health record. If you see a primary care provider, you can also send messages to your care team and make appointments. If you have questions, please call your primary care clinic.  If you do not have a primary care provider, please call [phone] and they will assist you.        Care EveryWhere ID     This is your Care EveryWhere ID. This could be used by other organizations to  access your [place] medical records  [id number]         Blood Pressure from Last 3 Encounters:   10/24/17 106/70   10/06/17 114/78   10/02/17 113/73    Weight from Last 3 Encounters:   10/24/17 154 lb (69.9 kg)   10/06/17 154 lb (69.9 kg)   10/02/17 152 lb (68.9 kg)              Today, you had the following     No orders found for display       Primary Care Provider Office Phone # Fax #    Cory Waldemar Acosta PA-C [phone] [address] [facility] [address]        Equal Access to Services     [facility] : Hadii arlette art haddelroyo Lea, waaxda luqadaha, qaybta kaalmada adeheatheryada, essie quan. So [facility] [phone].    ATENCIÓN: Si habla español, tiene a dubon disposición servicios gratuitos de asistencia lingüística. [facility] [phone].    We comply with applicable federal civil rights laws and [place] laws. We do not discriminate on the basis of race, color, national origin, age, disability, sex, sexual orientation, or gender identity.            Thank you!     Thank you for choosing [facility] - PRIMARY CARE SKIN  for your care. Our goal is always to provide you with excellent care. Hearing back from our patients is one way we can continue to improve our services. Please take a few minutes to complete the written survey that you may receive in the mail after your visit with us. Thank you!             Your Updated Medication List - Protect others around you: Learn how to safely use, store and throw away your medicines at www.disposemymeds.org.          This list is accurate as of: 11/29/17  3:13 PM.  Always use your most recent med list.                   Brand Name Dispense Instructions for use Diagnosis    ketoconazole 2 % cream    NIZORAL    30 g    Apply topically 2 times daily    Seborrheic dermatitis       lisinopril 20 MG tablet    PRINIVIL/ZESTRIL    90 tablet    Take 1 tablet (20 mg) by mouth daily    Membranous glomerulonephritis,  Benign essential hypertension       olopatadine 0.1 % ophthalmic solution    PATANOL     INT 1 GTT IN OU BID        pravastatin 40 MG tablet    PRAVACHOL    90 tablet    TK 1 T PO QHS    Dyslipidemia       sulfamethoxazole-trimethoprim 800-160 MG per tablet    BACTRIM DS/SEPTRA DS    20 tablet    Take 1 tablet by mouth 2 times daily    Postoperative wound infection, initial encounter       valACYclovir 500 MG tablet    VALTREX     TAKE ONE TABLET PO DAILY FOR HERPES/SHINGLES WHILE ON CYCLOSPORIN        warfarin 5 MG tablet    COUMADIN

## 2017-11-29 NOTE — PROGRESS NOTES
[facility] - PRIMARY CARE SKIN    CC : Wide excision   SUBJECTIVE:                                                    Harley Alvarado is a 61 year old male who presents to clinic today for follow-up wide excision of a mild dysplastic nevus(i) on the left shoulder.    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    Tissue Pathology Report from 10/2/2017      Personal history of skin cancer : NO - history of mild dysplastic nevi  Family history of skin cancer : NO.     Sun Exposure History  Previous history of significant sun exposure: Outdoor hobbies include fishing.  Blistering sunburns : YES - once on entire body  Tanning beds : NO.  Sunscreen Use : YES, frequency : when outdoors for extended durations.  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    ROS : 14 point review of systems was negative except the symptoms listed above in the HPI.    This document serves as a record of the services and decisions personally performed and made by Deborah Corcoran MD. It was created on her behalf by Juan Garcia, a trained medical scribe.  The creation of this document is based on the scribe's personal observations and the provider's statements to the medical scribe.  Juan Garcia, November 29, 2017 2:31 PM      OBJECTIVE:                                                    GENERAL: healthy, alert and no distress  SKIN: [place] Skin Type - II.  Neck, Trunk and Arms were examined. The dermatoscope was used to help evaluate pigmented lesions.  Skin Pertinent Findings:  Left shoulder : 4 mm x 3 mm in size well-healed scar with residual brown pigmentation.      ASSESSMENT:                                                      Encounter Diagnoses   Name Primary?     Dysplastic  "nevus of left upper extremity Yes     History of dysplastic nevus          PLAN:                                                    Patient Instructions   FUTURE APPOINTMENTS  Follow up in 14 days for Suture Removal, with the nurse at any [facility] clinic. If you go elsewhere, make sure to call ahead of time to get on their schedule.  Follow up in 6 months for a full-body skin cancer screening.    WOUND CARE INSTRUCTIONS  1. After 24 hours, change dressing daily.  2. Wash hands before every dressing change.  3. Wash the wound area with a mild soap, then rinse  4. Gently pat dry with a sterile gauze or Q-tip.  5. Apply Vaseline or Aquaphor only over entire wound. Do NOT use Neosporin - as many people react to neomycin.  6. Finally, cover with a bandage or sterile non-stick gauze with micropore paper tape.  7. Repeat once daily until wound has healed.    Do not let the wound dry out.  The wound will heal faster and with better cosmetic results if routinely kept moist with step #5. It is a false belief that a wound heals better when it is exposed to air and allowed to dry out.      Soap, water and shampoo will not hurt this area.    Do not go swimming or take baths, but showering is encouraged.    Limit use of the area where the procedure was done for a few days to allow for optimal healing.    If you experience bleeding:  Repeat steps #2-5 and hold firm pressure on the area for 10 minutes without checking to see if the bleeding has stopped. \"Checking\" pulls off the protective wound clot and restarts the bleeding all over again. Re-apply pressure for 10 minutes if necessary to stop bleeding.  Use additional sterile gauze and tape to maintain pressure once bleeding has stopped.    Signs of Infection:  Infection can occur in any area where skin has been disrupted.  If you notice persistent redness, swelling, colored drainage, increasing pain, fever or other signs of infection, please call us at: [phone] and ask to " have me or my colleague paged. We will call you back to discuss.    Pathology Results:  You will be notified, generally via letter or MyChart, in approximately 10 days. If there is anything we need to discuss or further treatment needed, I will call you to discuss it.    Skin tissue can be some of the most challenging tissue for pathologists to examine, therefore we may use a specialist outside the [facility] system to read certain submitted tissue samples. When submitted to these outside specialists, [facility] will notify you that your tissue sample result has returned. However, this only means that the tissue sample has been sent to the specialist. These results are not available in [facility], so I will contact you when I receive the final report.    PATIENT INFORMATION : WOUNDS  During the healing process you will notice a number of changes. All wounds develop a small halo of redness surrounding the wound.  This means healing is occurring. Severe itching with extensive redness usually indicates sensitivity to the ointment or bandage tape used to dress the wound.  You should call our office if this develops.      Swelling  and/or discoloration around your surgical site is common, particularly when performed around the eye.    All wounds normally drain.  The larger the wound the more drainage there will be.  After 7-10 days, you will notice the wound beginning to shrink and new skin will begin to grow.  The wound is healed when you can see skin has formed over the entire area.  A healed wound has a healthy, shiny look to the surface and is red to dark pink in color to normalize.  Wounds may take approximately 4-6 weeks to heal.  Larger wounds may take 6-8 weeks. After the wound is healed you may discontinue dressing changes.    You may experience a sensation of tightness as your wound heals. This is normal and will gradually subside.    Your healed wound may be sensitive to temperature changes. This sensitivity improves  "with time, but if you re having a lot of discomfort, try to avoid temperature extremes.    Patients frequently experience itching after their wound appears to have healed because of the continue healing under the skin.  Plain Vaseline will help relieve the itching.    SUN PROTECTION INSTRUCTIONS  Sun damage can lead to skin cancer and premature aging of the skin.      The best way to protect from sun damage to your skin is to avoid the sun during peak hours (10 am - 2 pm) even on overcast days.      Use UPF sun-protective clothing, which while more expensive initially provides longer lasting coverage without having to worry about remembering to re-apply.  1. Wear a wide-brimmed hat and sunglasses.   2. Wear sun-protective clothing.  [employer] and other [employer] make sun protective clothing that are stylish, comfortable and cool. [employer] and other [employer] make UV arm sleeves suitable for golfing, gardening and other activities.      Sunscreen instructions:  1. Use sunscreens with Zinc Oxide, Titanium Dioxide or Avobenzone to protect from UVA rays.  2. Use SPF 30-50+ to protect from UVB rays.  3. Re-apply every 2 hours even if water resistant.  4. Apply on your face every day even when cloudy and even in the winter. UVA \"aging rays\" penetrate window glass and are just as strong in the winter as in the summer.    Product Recommendations:    Good examples include: Blue Vicenta, EltaMD, Solbar    Good daily moisturizers with SPF: Vanicream, CeraVe.    For sensitive skin, consider : SkinMedica Essential Defense Mineral Shield Broad Spectrum SPF 35      Never use tanning beds. Tanning beds are associated with much higher risks of skin cancer.    All tanning damages the skin. Aim for ivory skin year round and you will have less trouble with your skin in years to come. There is no merit in getting \"a base tan\" before a warm weather vacation, as any tanning indicates your body's response to sun " damage.    Stop smoking. Smokers have higher rates of skin cancer and also have premature skin wrinkling.    FYI  You should use about 3 tablespoons of sunscreen to protect your whole body. Thus a typical eight ounce bottle of sunscreen should last 4 applications. Remember, that the SPF rating is compromised if you don t apply enough. Most people only apply 1/2 - 1/3 of the amount they need. Also don t forget areas such as your ears, feet, upper back and harder to reach places. Keep in mind that these amounts should be increased for larger body sizes.    Sunscreens with titanium dioxide and/or zinc oxide in the active ingredients are physical blockers as opposed to chemical blockers. Chemical-free sunscreens should not irritate the skin.    Spray-on sunscreens may be used for touch-up application only, not as a base layer. Also, use with caution around small children due to inhalation risk.    Avoid retinyl palmitate products.    Avoid combination products that include both sunscreen and insect repellant, as sunscreen should be applied every 2 hours, but insect repellant should not be applied as frequently.    SPF means sun protection factor, which is just the degree to which the sunscreen can protect against UVB rays. There is no rating system for UVA rays. SPF is calculated as the time skin will burn when sunscreen is applied vs. skin without sunscreen.    Water resistant sunscreens should be re-applied every 1-2 hours.    For more information:  http://www.skincancer.org/prevention/sun-protection/sunscreen/sunscreens-safe-and-effective        PROCEDURES:                                                    Name : Wide Excision  Indication : Wide excision of tissue for pathology evaluation of dysplasia.  Location(s) : left shoulder.  Completed by : Deborah Corcoran MD  Photo Taken : no.  Anesthesia : Patient was anesthetized by infiltrating the area surrounding the lesion with 1% lidocaine.   epinephrine [id number] :  Yes.  Buffered with bicarbonate : Yes.  Note : Prior to procedure we discussed expectations for healing, risk of infection, and scar formation. Discussed other treatment options available. Discussed the risk of pain, infection, scarring, hypo- or hyperpigmentation and recurrence or need for re-treatment. The benefits of treatment and alternative treatments were also discussed.    During this procedure, the universal protocol was utilized. The patient's identity was confirmed by no less than two patient identifiers, correct procedure was verified, correct site was verified and marked as applicable and a final pause was completed.    Sterile technique was used throughout the procedure. The skin was cleaned and prepped with Chloroprep. A 4 mm margin was marked out on each side of the lesion. Sterile drapes were laid out. Once adequate anesthesia was obtained, an elliptical incision was made with a #10 blade through the epidermis and dermis. The tissue specimen was placed in formalin and sent to pathology.    Direct pressure and monopolar cautery was applied for hemostasis. Edges were undermined with a Metzenbaum. Defect was approximated with 3-0 Vicryl and edges were approximated with 3-0 Prolene horizontal mattress and interrupted simple stitch. Minimal bleeding occurred. Dressing was applied and patient left in satisfactory condition.    Widest diameter : 1.2 cm.  Length : 4 cm.    Total number of stitches in closure of epidermis : 6    Primary provider and referring provider will be informed regarding the wound culture and tissue sample report when it returns.    Suture removal : 10-14 days.      The information in this document, created by the medical scribe for me, accurately reflects the services I personally performed and the decisions made by me. I have reviewed and approved this document for accuracy prior to leaving the patient care area.  Deborah Corcoran MD November 29, 2017 2:31 PM  [facility]  CARE SKIN

## 2017-11-30 ENCOUNTER — DOCUMENTATION ONLY (OUTPATIENT)
Dept: SLEEP MEDICINE | Facility: CLINIC | Age: 62
End: 2017-11-30

## 2017-12-01 ENCOUNTER — TELEPHONE (OUTPATIENT)
Dept: SLEEP MEDICINE | Facility: CLINIC | Age: 62
End: 2017-12-01

## 2017-12-01 NOTE — PROGRESS NOTES
ADDENDUM:                                                    December 1, 2017 2:17 PM  Pathology report results:

## 2017-12-01 NOTE — TELEPHONE ENCOUNTER
Left message with Harley that HST was not valid test.  Notifying Dr Castañeda of this asking orders.  Explained to Harley he need not come in for his 12/4/17 appointment at  for the results, unless he wants to discuss something with Dr Castañeda.  Route to Dr Castañeda for his review.

## 2017-12-04 ENCOUNTER — TELEPHONE (OUTPATIENT)
Dept: FAMILY MEDICINE | Facility: CLINIC | Age: 62
End: 2017-12-04

## 2017-12-04 NOTE — TELEPHONE ENCOUNTER
Path results 11/29/17: margins clear.    Patient notified of test results and PCP's message, no further questions.  Edwige Kumar RN  [facility]  [phone]

## 2017-12-04 NOTE — TELEPHONE ENCOUNTER
The writer updated Mr. Alvarado the HST which was invalid as it only recorded less than 3 hours and he showed understanding. He lost the nasal flow at 2:17 am up to 4:55 am.  Will re-do HST and the follow up.

## 2017-12-05 ENCOUNTER — ANTICOAGULATION THERAPY VISIT (OUTPATIENT)
Dept: NURSING | Facility: CLINIC | Age: 62
End: 2017-12-05
Payer: COMMERCIAL

## 2017-12-05 DIAGNOSIS — I26.99 PULMONARY EMBOLISM (H): ICD-10-CM

## 2017-12-05 DIAGNOSIS — Z79.01 LONG-TERM (CURRENT) USE OF ANTICOAGULANTS: ICD-10-CM

## 2017-12-05 LAB — INR POINT OF CARE: 1.9 (ref 0.86–1.14)

## 2017-12-05 PROCEDURE — 36416 COLLJ CAPILLARY BLOOD SPEC: CPT

## 2017-12-05 PROCEDURE — 85610 PROTHROMBIN TIME: CPT | Mod: QW

## 2017-12-05 PROCEDURE — 99207 ZZC NO CHARGE NURSE ONLY: CPT

## 2017-12-05 NOTE — MR AVS SNAPSHOT
Harley Alvarado   12/5/2017 11:15 AM   Anticoagulation Therapy Visit    Description:  61 year old male   Provider:  [facility]   Department:  [facility] Nurse           INR as of 12/5/2017     Today's INR 1.9!      Anticoagulation Summary as of 12/5/2017     INR goal 2.0-3.0   Today's INR 1.9!   Full instructions 12/5: 10 mg; Otherwise 5 mg on Sun; 7.5 mg all other days   Next INR check 1/2/2018    Indications   Long-term (current) use of anticoagulants [Z79.01] [Z79.01]  Pulmonary embolism (H) [I26.99]         Contact Numbers     Clinic Number:         December 2017 Details    Sun Mon Tue Wed Thu Fri Sat          1               2                 3               4               5      10 mg   See details      6      7.5 mg         7      7.5 mg         8      7.5 mg         9      7.5 mg           10      5 mg         11      7.5 mg         12      7.5 mg         13      7.5 mg         14      7.5 mg         15      7.5 mg         16      7.5 mg           17      5 mg         18      7.5 mg         19      7.5 mg         20      7.5 mg         21      7.5 mg         22      7.5 mg         23      7.5 mg           24      5 mg         25      7.5 mg         26      7.5 mg         27      7.5 mg         28      7.5 mg         29      7.5 mg         30      7.5 mg           31      5 mg                Date Details   12/05 This INR check               How to take your warfarin dose     To take:  5 mg Take 1 of the 5 mg tablets.    To take:  7.5 mg Take 1.5 of the 5 mg tablets.    To take:  10 mg Take 2 of the 5 mg tablets.           January 2018 Details    Sun Mon Tue Wed Thu Fri Sat      1      7.5 mg         2            3               4               5               6                 7               8               9               10               11               12               13                 14               15               16               17               18               19               20                  21               22               23               24               25               26               27                 28               29               30               31                   Date Details   No additional details    Date of next INR:  1/2/2018         How to take your warfarin dose     To take:  7.5 mg Take 1.5 of the 5 mg tablets.

## 2017-12-05 NOTE — PROGRESS NOTES
ANTICOAGULATION FOLLOW-UP CLINIC VISIT    Patient Name:  Harley Alvarado  Date:  12/5/2017  Contact Type:  Face to Face    SUBJECTIVE:     Patient Findings     Positives Missed doses    Comments Pt will make up missed 2.5 mg dose today and continue with regular dose.  Will have Azul follow up with PCP about colonoscopy hold/bridging for patient and call with instructions and RTC           OBJECTIVE    INR Protime   Date Value Ref Range Status   12/05/2017 1.9 (A) 0.86 - 1.14 Final       ASSESSMENT / PLAN  No question data found.  Anticoagulation Summary as of 12/5/2017     INR goal 2.0-3.0   Today's INR 1.9!   Maintenance plan 5 mg (5 mg x 1) on Sun; 7.5 mg (5 mg x 1.5) all other days   Full instructions 12/5: 10 mg; Otherwise 5 mg on Sun; 7.5 mg all other days   Weekly total 50 mg   Plan last modified Azul Garber RN (10/10/2017)   Next INR check 1/2/2018   Target end date Indefinite    Indications   Long-term (current) use of anticoagulants [Z79.01] [Z79.01]  Pulmonary embolism (H) [I26.99]         Anticoagulation Episode Summary     INR check location     Preferred lab     Send INR reminders to [facility]    Comments       Anticoagulation Care Providers     Provider Role Specialty Phone number    Cory Acosta PA-C Responsible Physician Assistant [phone]            See the Encounter Report to view Anticoagulation Flowsheet and Dosing Calendar (Go to Encounters tab in chart review, and find the Anticoagulation Therapy Visit)    Dosage adjustment made based on physician directed care plan.    Deanna Sharma RN

## 2017-12-05 NOTE — TELEPHONE ENCOUNTER
Pt informed of message below.    Please follow up with pt regarding colonoscopy hold and recheck.  Not sure if this was discussed already with PCP or not    Viky Sharma RN, BSN

## 2017-12-07 ENCOUNTER — TELEPHONE (OUTPATIENT)
Dept: NURSING | Facility: CLINIC | Age: 62
End: 2017-12-07

## 2017-12-07 NOTE — TELEPHONE ENCOUNTER
Damian Liang,     Patient is fairly moderate risk given history of clot >12 months ago. I do not feel patient needs to bridge.     -Hill Acosta, PAC

## 2017-12-07 NOTE — TELEPHONE ENCOUNTER
Patient is scheduled for colonoscopy, 12/14/17.    I will advise patient to hold his Warfarin x 5 days prior to procedure.  Do you want patient bridged with Lovenox?  Patient has history of PE (2011) and Nephropathy.    ChadsVasc = 2  HasBled = 1    Thank you,  Azul Garber RN

## 2017-12-11 ENCOUNTER — RECORDS - HEALTHEAST (OUTPATIENT)
Dept: ADMINISTRATIVE | Facility: OTHER | Age: 62
End: 2017-12-11

## 2017-12-13 ENCOUNTER — ALLIED HEALTH/NURSE VISIT (OUTPATIENT)
Dept: NURSING | Facility: CLINIC | Age: 62
End: 2017-12-13
Payer: COMMERCIAL

## 2017-12-13 DIAGNOSIS — Z48.02 ENCOUNTER FOR REMOVAL OF SUTURES: Primary | ICD-10-CM

## 2017-12-13 PROCEDURE — 99207 ZZC NO CHARGE NURSE ONLY: CPT

## 2017-12-13 NOTE — PROGRESS NOTES
Harley Alvarado is a 62 year old male who presents for suture removal  Suture removal:     Date sutures applied: 11/29/17         Where (setting) in which they applied:clinic    Description:  Type: sutures #6  Location: left shoulder    History:    Cause of laceration: surgical excision    Accompanying Signs & Symptoms:  Redness: mild and expected at margins  Warmth: no  Drainage: no  Still bleeding: no  Fevers: no    Last tetanus shot: 9/29/17  The patient has had the sutures in place for 14 days he wound is healing well with no signs of infection  Pain scale 0  (0/10)  All six sutures were easily removed today.      Routine wound care discussed.    Skin regains strength slowly. After suture removal, the wound has only regained about 5%-10% of its strength. Protect the wound from injury in the next month. Injured tissue also requires additional protection from sun's damaging ultraviolet rays for the next several months. The use of sunscreen during this period of healing is well advised for those areas that are exposed.  If further questions/concerns or if symptoms do not improve, worsen or new symptoms develop, call your PCP or [facility] as soon as possible.    Guideline used:  RN protocol and clinical experience  Nirmal Duque RN

## 2017-12-13 NOTE — MR AVS SNAPSHOT
After Visit Summary   12/13/2017    Harley Alvarado    MRN: [id number]           Patient Information     Date Of Birth          1955        Visit Information        Provider Department      12/13/2017 10:30 AM CR RN [facility]        Today's Diagnoses     Encounter for removal of sutures    -  1       Follow-ups after your visit        Your next 10 appointments already scheduled     Dec 18, 2017  3:45 PM CST   Anticoagulation Visit with [facility]   [facility] ([facility])    [address] [place] [id number] [phone]              Who to contact     If you have questions or need follow up information about today's clinic visit or your schedule please contact [facility] directly at [phone].  Normal or non-critical lab and imaging results will be communicated to you by MyChart, letter or phone within 4 business days after the clinic has received the results. If you do not hear from us within 7 days, please contact the clinic through MyChart or phone. If you have a critical or abnormal lab result, we will notify you by phone as soon as possible.  Submit refill requests through [employer] or call your pharmacy and they will forward the refill request to us. Please allow 3 business days for your refill to be completed.          Additional Information About Your Visit        MyChart Information     [employer] gives you secure access to your electronic health record. If you see a primary care provider, you can also send messages to your care team and make appointments. If you have questions, please call your primary care clinic.  If you do not have a primary care provider, please call [phone] and they will assist you.        Care EveryWhere ID     This is your Care EveryWhere ID. This could be used by other organizations to access your [place] medical records  [id number]          Blood Pressure from Last 3 Encounters:   10/24/17 106/70   10/06/17 114/78   10/02/17 113/73    Weight from Last 3 Encounters:   10/24/17 154 lb (69.9 kg)   10/06/17 154 lb (69.9 kg)   10/02/17 152 lb (68.9 kg)              Today, you had the following     No orders found for display       Primary Care Provider Office Phone # Fax #    Cory Wlademar Acosta PA-C [phone] [address] [facility] [address]        Equal Access to Services     [facility]: Hadii aad ku hadasho Soomaali, waaxda luqadaha, qaybta kaalmada adeheatheryajania, essie larkin . So [facility] [phone].    ATENCIÓN: Si habla español, tiene a dubon disposición servicios gratuitos de asistencia lingüística. [facility] [phone].    We comply with applicable federal civil rights laws and [place] laws. We do not discriminate on the basis of race, color, national origin, age, disability, sex, sexual orientation, or gender identity.            Thank you!     Thank you for choosing [facility]  for your care. Our goal is always to provide you with excellent care. Hearing back from our patients is one way we can continue to improve our services. Please take a few minutes to complete the written survey that you may receive in the mail after your visit with us. Thank you!             Your Updated Medication List - Protect others around you: Learn how to safely use, store and throw away your medicines at www.disposemymeds.org.          This list is accurate as of: 12/13/17 10:59 AM.  Always use your most recent med list.                   Brand Name Dispense Instructions for use Diagnosis    ketoconazole 2 % cream    NIZORAL    30 g    Apply topically 2 times daily    Seborrheic dermatitis       lisinopril 20 MG tablet    PRINIVIL/ZESTRIL    90 tablet    Take 1 tablet (20 mg) by mouth daily    Membranous glomerulonephritis, Benign essential hypertension       olopatadine 0.1 % ophthalmic  solution    PATANOL     INT 1 GTT IN OU BID        pravastatin 40 MG tablet    PRAVACHOL    90 tablet    TK 1 T PO QHS    Dyslipidemia       sulfamethoxazole-trimethoprim 800-160 MG per tablet    BACTRIM DS/SEPTRA DS    20 tablet    Take 1 tablet by mouth 2 times daily    Postoperative wound infection, initial encounter       valACYclovir 500 MG tablet    VALTREX     TAKE ONE TABLET PO DAILY FOR HERPES/SHINGLES WHILE ON CYCLOSPORIN        warfarin 5 MG tablet    COUMADIN

## 2017-12-19 ENCOUNTER — RECORDS - HEALTHEAST (OUTPATIENT)
Dept: ADMINISTRATIVE | Facility: OTHER | Age: 62
End: 2017-12-19

## 2017-12-19 ENCOUNTER — ANTICOAGULATION THERAPY VISIT (OUTPATIENT)
Dept: NURSING | Facility: CLINIC | Age: 62
End: 2017-12-19
Payer: COMMERCIAL

## 2017-12-19 DIAGNOSIS — Z79.01 LONG-TERM (CURRENT) USE OF ANTICOAGULANTS: ICD-10-CM

## 2017-12-19 DIAGNOSIS — I26.99 PULMONARY EMBOLISM (H): ICD-10-CM

## 2017-12-19 LAB — INR POINT OF CARE: 2.1 (ref 0.86–1.14)

## 2017-12-19 PROCEDURE — 85610 PROTHROMBIN TIME: CPT | Mod: QW

## 2017-12-19 PROCEDURE — 99207 ZZC NO CHARGE NURSE ONLY: CPT

## 2017-12-19 PROCEDURE — 36416 COLLJ CAPILLARY BLOOD SPEC: CPT

## 2017-12-19 NOTE — PROGRESS NOTES
ANTICOAGULATION FOLLOW-UP CLINIC VISIT    Patient Name:  Harley Alvarado  Date:  12/19/2017  Contact Type:  Face to Face    SUBJECTIVE:     Patient Findings     Positives Intentional hold of therapy (Pt held x 2 days last week due to preparing for colonoscopy.  Pt decided NOT to proceed with the colonoscopy, he may reschedule next year.)           OBJECTIVE    INR Protime   Date Value Ref Range Status   12/19/2017 2.1 (A) 0.86 - 1.14 Final       ASSESSMENT / PLAN  INR assessment THER    Recheck INR In: 4 WEEKS    INR Location Clinic      Anticoagulation Summary as of 12/19/2017     INR goal 2.0-3.0   Today's INR 2.1   Maintenance plan 5 mg (5 mg x 1) on Sun; 7.5 mg (5 mg x 1.5) all other days   Full instructions 5 mg on Sun; 7.5 mg all other days   Weekly total 50 mg   No change documented Azul Garber RN   Plan last modified Azul Garber RN (10/10/2017)   Next INR check 1/16/2018   Target end date Indefinite    Indications   Long-term (current) use of anticoagulants [Z79.01] [Z79.01]  Pulmonary embolism (H) [I26.99]         Anticoagulation Episode Summary     INR check location     Preferred lab     Send INR reminders to [facility]    Comments       Anticoagulation Care Providers     Provider Role Specialty Phone number    Cory Acosta PA-C Responsible Physician Assistant [phone]            See the Encounter Report to view Anticoagulation Flowsheet and Dosing Calendar (Go to Encounters tab in chart review, and find the Anticoagulation Therapy Visit)        Azul Garber RN

## 2017-12-19 NOTE — MR AVS SNAPSHOT
Harley Alvarado   12/19/2017 2:15 PM   Anticoagulation Therapy Visit    Description:  62 year old male   Provider:  [facility]   Department:  [facility] Nurse           INR as of 12/19/2017     Today's INR 2.1      Anticoagulation Summary as of 12/19/2017     INR goal 2.0-3.0   Today's INR 2.1   Full instructions 5 mg on Sun; 7.5 mg all other days   Next INR check 1/16/2018    Indications   Long-term (current) use of anticoagulants [Z79.01] [Z79.01]  Pulmonary embolism (H) [I26.99]         Your next Anticoagulation Clinic appointment(s)     Jan 16, 2018  3:15 PM CST   [facility]   [facility]    [address] [place] [id number] [phone]              Contact Numbers     Clinic Number:         December 2017 Details    Sun Mon Tue Wed Thu Fri Sat          1               2                 3               4               5               6               7               8               9                 10               11               12               13               14               15               16                 17               18               19      7.5 mg   See details      20      7.5 mg         21      7.5 mg         22      7.5 mg         23      7.5 mg           24      5 mg         25      7.5 mg         26      7.5 mg         27      7.5 mg         28      7.5 mg         29      7.5 mg         30      7.5 mg           31      5 mg                Date Details   12/19 This INR check               How to take your warfarin dose     To take:  5 mg Take 1 of the 5 mg tablets.    To take:  7.5 mg Take 1.5 of the 5 mg tablets.           January 2018 Details    Sun Mon Tue Wed Thu Fri Sat      1      7.5 mg         2      7.5 mg         3      7.5 mg         4      7.5 mg         5      7.5 mg         6      7.5 mg           7      5 mg         8      7.5 mg         9      7.5 mg          10      7.5 mg         11      7.5 mg         12      7.5 mg         13      7.5 mg           14      5 mg         15      7.5 mg         16            17               18               19               20                 21               22               23               24               25               26               27                 28               29               30               31                   Date Details   No additional details    Date of next INR:  1/16/2018         How to take your warfarin dose     To take:  5 mg Take 1 of the 5 mg tablets.    To take:  7.5 mg Take 1.5 of the 5 mg tablets.

## 2017-12-24 DIAGNOSIS — I10 BENIGN ESSENTIAL HYPERTENSION: ICD-10-CM

## 2017-12-24 DIAGNOSIS — N05.2 MEMBRANOUS GLOMERULONEPHRITIS: ICD-10-CM

## 2017-12-24 DIAGNOSIS — E78.5 DYSLIPIDEMIA: ICD-10-CM

## 2017-12-27 RX ORDER — LISINOPRIL 20 MG/1
TABLET ORAL
Qty: 90 TABLET | Refills: 2 | Status: SHIPPED | OUTPATIENT
Start: 2017-12-27 | End: 2019-11-22

## 2017-12-27 RX ORDER — PRAVASTATIN SODIUM 40 MG
TABLET ORAL
Qty: 90 TABLET | Refills: 2 | Status: SHIPPED | OUTPATIENT
Start: 2017-12-27 | End: 2019-11-22

## 2017-12-27 NOTE — TELEPHONE ENCOUNTER
Requested Prescriptions   Pending Prescriptions Disp Refills     pravastatin (PRAVACHOL) 40 MG tablet [Pharmacy Med Name: PRAVASTATIN 40MG TABLETS] 90 tablet 0    Last Written Prescription Date:  9/29/17  Last Fill Quantity: 90,  # refills: 0   [place] [facility] or [facility] prescribing provider:  11/29/2017   Future Office Visit:      Sig: TAKE 1 TABLET BY MOUTH EVERY NIGHT AT BEDTIME    Statins Protocol Passed    12/24/2017 10:10 PM       Passed - LDL on file in past 12 months    Recent Labs   Lab Test  10/02/17   0759   LDL  82          Passed - No abnormal creatine kinase in past 12 months    No lab results found.         Passed - Recent or future visit with authorizing provider    Patient had office visit in the last year or has a visit in the next 30 days with authorizing provider.  See chart review.          Passed - Patient is age 18 or older   ________________________________________________________________________________       lisinopril (PRINIVIL/ZESTRIL) 20 MG tablet [Pharmacy Med Name: LISINOPRIL 20MG TABLETS] 90 tablet 0    Last Written Prescription Date:  9/29/17  Last Fill Quantity: 90,  # refills: 0   Last [place] [facility] [facility] or [facility] prescribing provider:  11/29/2017   Future Office Visit:      Sig: TAKE 1 TABLET(20 MG) BY MOUTH DAILY    ACE Inhibitors (Including Combos) Protocol Failed    12/24/2017 10:10 PM       Failed - Normal serum creatinine on file in past 12 months    Recent Labs   Lab Test  10/02/17   0759   CR  1.38*          Passed - Blood pressure under 140/90    BP Readings from Last 3 Encounters:   10/24/17 106/70   10/06/17 114/78   10/02/17 113/73          Passed - Recent or future visit with authorizing provider's specialty    Patient had office visit in the last year or has a visit in the next 30 days with authorizing provider.  See chart review.          Passed - Patient is age 18 or older       Passed - Normal serum potassium on file in past 12 months     Recent Labs   Lab Test  10/02/17   0759   POTASSIUM  4.2

## 2017-12-27 NOTE — TELEPHONE ENCOUNTER
[facility] results note: Kidney function is stable. Liver function, cholesterol, blood sugar, and electrolytes are all normal.   Prescriptions approved per [facility] [place] Refill Protocol.  Nirmal Duque RN

## 2018-01-02 ENCOUNTER — TELEPHONE (OUTPATIENT)
Dept: SLEEP MEDICINE | Facility: CLINIC | Age: 63
End: 2018-01-02

## 2018-01-02 NOTE — TELEPHONE ENCOUNTER
Left 2nd message to please call to reschedule  of hst.  Asked that he call us back if he wishes to not do the testing also.  Left nurseline to call .

## 2018-01-05 ENCOUNTER — TELEPHONE (OUTPATIENT)
Dept: FAMILY MEDICINE | Facility: CLINIC | Age: 63
End: 2018-01-05

## 2018-01-05 NOTE — LETTER
[address] [address] [phone]  January 5, 2018    Harley Alvarado  [address] [facility] [address]    Dear Harley,    I care about your health and have reviewed your health plan. I have reviewed your medical conditions, medication list, and lab results and am making recommendations based on this review, to better manage your health.    You are in particular need of attention regarding:  -Colon Cancer Screening    I am recommending that you:  {recommendations:-schedule a COLONOSCOPY to look for colon cancer (due every 10 years or 5 years in higher risk situations.)   Colon cancer is now the second leading cause of death in the [place] for both men and women and there are over 130,000 new cases and 50,000 deaths per year from colon cancer.  Colonoscopies can prevent 90-95% of these deaths.  Problem lesions can be removed before they ever become cancer.  This test is not only looking for cancer, but also getting rid of precancerious lesions.  If you do not wish to do a colonoscopy or cannot afford to do one, at this time, there is another option. It is called a FIT test or Fecal Immunochemical Occult Blood Test (take home stool sample kit).  It does not replace the colonoscopy for colorectal cancer screening, but it can detect hidden bleeding in the lower colon.  It does need to be repeated every year and if a positive result is obtained, you would be referred for a colonoscopy.  If you have completed either one of these tests at another facility, please have the records sent to our clinic so that we can best coordinate your care.    Please call us at [phone] (or use [employer]) to address the above recommendations.     Thank you for trusting [facility] and we appreciate the opportunity to serve you.  We look forward to supporting your healthcare needs in the future.    Healthy RegardsCory  Dave MORALES

## 2018-01-05 NOTE — TELEPHONE ENCOUNTER
Panel Management Review      Patient has the following on his problem list:     Depression / Dysthymia review    Measure:  Needs PHQ-9 score of 4 or less during index window.  Administer PHQ-9 and if score is 5 or more, send encounter to provider for next steps.    If PHQ-9 recheck is 5 or more, route to provider for next steps.    Patient is due for:  None    Hypertension   Last three blood pressure readings:  BP Readings from Last 3 Encounters:   10/24/17 106/70   10/06/17 114/78   10/02/17 113/73     Blood pressure: Passed    HTN Guidelines:  Age 18-59 BP range:  Less than 140/90  Age 60-85 with Diabetes:  Less than 140/90  Age 60-85 without Diabetes:  less than 150/90          Composite cancer screening  Chart review shows that this patient is due/due soon for the following Colonoscopy  Summary:    Patient is due/failing the following:   COLONOSCOPY    Action needed:   Patient needs referral/order: colonoscopy or FIT test    Type of outreach:    Sent letter.    Questions for provider review:    None                                                                                                                                    MORIAH Corea         Chart routed to Care Team .

## 2018-01-22 ENCOUNTER — ANTICOAGULATION THERAPY VISIT (OUTPATIENT)
Dept: NURSING | Facility: CLINIC | Age: 63
End: 2018-01-22
Payer: COMMERCIAL

## 2018-01-22 DIAGNOSIS — Z79.01 LONG-TERM (CURRENT) USE OF ANTICOAGULANTS: ICD-10-CM

## 2018-01-22 DIAGNOSIS — I26.99 PULMONARY EMBOLISM (H): ICD-10-CM

## 2018-01-22 LAB — INR POINT OF CARE: 2.9 (ref 0.86–1.14)

## 2018-01-22 PROCEDURE — 85610 PROTHROMBIN TIME: CPT | Mod: QW

## 2018-01-22 PROCEDURE — 99207 ZZC NO CHARGE NURSE ONLY: CPT

## 2018-01-22 PROCEDURE — 36416 COLLJ CAPILLARY BLOOD SPEC: CPT

## 2018-01-22 NOTE — MR AVS SNAPSHOT
Harley Alvarado   1/22/2018 11:00 AM   Anticoagulation Therapy Visit    Description:  62 year old male   Provider:  [facility]   Department:  [facility] Nurse           INR as of 1/22/2018     Today's INR 2.9      Anticoagulation Summary as of 1/22/2018     INR goal 2.0-3.0   Today's INR 2.9   Full instructions 5 mg on Sun; 7.5 mg all other days   Next INR check 2/19/2018    Indications   Long-term (current) use of anticoagulants [Z79.01] [Z79.01]  Pulmonary embolism (H) [I26.99]         Contact Numbers     Clinic Number:         January 2018 Details    Sun Mon Tue Wed Thu Fri Sat      1               2               3               4               5               6                 7               8               9               10               11               12               13                 14               15               16               17               18               19               20                 21               22      7.5 mg   See details      23      7.5 mg         24      7.5 mg         25      7.5 mg         26      7.5 mg         27      7.5 mg           28      5 mg         29      7.5 mg         30      7.5 mg         31      7.5 mg             Date Details   01/22 This INR check               How to take your warfarin dose     To take:  5 mg Take 1 of the 5 mg tablets.    To take:  7.5 mg Take 1.5 of the 5 mg tablets.           February 2018 Details    Sun Mon Tue Wed Thu Fri Sat         1      7.5 mg         2      7.5 mg         3      7.5 mg           4      5 mg         5      7.5 mg         6      7.5 mg         7      7.5 mg         8      7.5 mg         9      7.5 mg         10      7.5 mg           11      5 mg         12      7.5 mg         13      7.5 mg         14      7.5 mg         15      7.5 mg         16      7.5 mg         17      7.5 mg           18      5 mg         19            20               21               22               23               24                  25               26               27               28                   Date Details   No additional details    Date of next INR:  2/19/2018         How to take your warfarin dose     To take:  5 mg Take 1 of the 5 mg tablets.    To take:  7.5 mg Take 1.5 of the 5 mg tablets.

## 2018-01-22 NOTE — PROGRESS NOTES
ANTICOAGULATION FOLLOW-UP CLINIC VISIT    Patient Name:  Harley Alvarado  Date:  1/22/2018  Contact Type:  Face to Face    SUBJECTIVE:     Patient Findings     Positives No Problem Findings           OBJECTIVE    INR Protime   Date Value Ref Range Status   01/22/2018 2.9 (A) 0.86 - 1.14 Final       ASSESSMENT / PLAN  INR assessment THER    Recheck INR In: 4 WEEKS    INR Location Clinic      Anticoagulation Summary as of 1/22/2018     INR goal 2.0-3.0   Today's INR 2.9   Maintenance plan 5 mg (5 mg x 1) on Sun; 7.5 mg (5 mg x 1.5) all other days   Full instructions 5 mg on Sun; 7.5 mg all other days   Weekly total 50 mg   No change documented Azul Garber RN   Plan last modified Azul Garber RN (10/10/2017)   Next INR check 2/19/2018   Target end date Indefinite    Indications   Long-term (current) use of anticoagulants [Z79.01] [Z79.01]  Pulmonary embolism (H) [I26.99]         Anticoagulation Episode Summary     INR check location     Preferred lab     Send INR reminders to [facility]    Comments       Anticoagulation Care Providers     Provider Role Specialty Phone number    Cory Acosta PA-C Responsible Physician Assistant [phone]            See the Encounter Report to view Anticoagulation Flowsheet and Dosing Calendar (Go to Encounters tab in chart review, and find the Anticoagulation Therapy Visit)        Azul Garber RN

## 2018-03-13 ENCOUNTER — ANTICOAGULATION THERAPY VISIT (OUTPATIENT)
Dept: NURSING | Facility: CLINIC | Age: 63
End: 2018-03-13
Payer: COMMERCIAL

## 2018-03-13 DIAGNOSIS — I26.99 PULMONARY EMBOLISM (H): ICD-10-CM

## 2018-03-13 DIAGNOSIS — Z79.01 LONG-TERM (CURRENT) USE OF ANTICOAGULANTS: ICD-10-CM

## 2018-03-13 LAB — INR POINT OF CARE: 2.8 (ref 0.86–1.14)

## 2018-03-13 PROCEDURE — 99207 ZZC NO CHARGE NURSE ONLY: CPT

## 2018-03-13 PROCEDURE — 85610 PROTHROMBIN TIME: CPT | Mod: QW

## 2018-03-13 PROCEDURE — 36416 COLLJ CAPILLARY BLOOD SPEC: CPT

## 2018-03-13 RX ORDER — WARFARIN SODIUM 5 MG/1
TABLET ORAL
Qty: 120 TABLET | Refills: 1 | Status: SHIPPED | OUTPATIENT
Start: 2018-03-13 | End: 2018-09-19

## 2018-03-13 NOTE — PROGRESS NOTES
ANTICOAGULATION FOLLOW-UP CLINIC VISIT    Patient Name:  Harley Alvarado  Date:  3/13/2018  Contact Type:  Face to Face    SUBJECTIVE:     Patient Findings     Positives No Problem Findings           OBJECTIVE    INR Protime   Date Value Ref Range Status   03/13/2018 2.8 (A) 0.86 - 1.14 Final       ASSESSMENT / PLAN  INR assessment THER    Recheck INR In: 4 WEEKS    INR Location Clinic      Anticoagulation Summary as of 3/13/2018     INR goal 2.0-3.0   Today's INR 2.8   Maintenance plan 5 mg (5 mg x 1) on Sun; 7.5 mg (5 mg x 1.5) all other days   Full instructions 5 mg on Sun; 7.5 mg all other days   Weekly total 50 mg   No change documented Azul Garber RN   Plan last modified Azul Garber RN (10/10/2017)   Next INR check 4/10/2018   Target end date Indefinite    Indications   Long-term (current) use of anticoagulants [Z79.01] [Z79.01]  Pulmonary embolism (H) [I26.99]         Anticoagulation Episode Summary     INR check location     Preferred lab     Send INR reminders to [facility]    Comments       Anticoagulation Care Providers     Provider Role Specialty Phone number    Cory Acosta PA-C Responsible Physician Assistant [phone]            See the Encounter Report to view Anticoagulation Flowsheet and Dosing Calendar (Go to Encounters tab in chart review, and find the Anticoagulation Therapy Visit)        Azul Garber RN

## 2018-03-29 NOTE — MR AVS SNAPSHOT
Harley Alvarado   3/13/2018 1:00 PM   Anticoagulation Therapy Visit    Description:  62 year old male   Provider[facility]  [facility]   Department:  Cr Nurse           INR as of 3/13/2018     Today's INR 2.8      Anticoagulation Summary as of 3/13/2018     INR goal 2.0-3.0   Today's INR 2.8   Full instructions 5 mg on Sun; 7.5 mg all other days   Next INR check 4/10/2018    Indications   Long-term (current) use of anticoagulants [Z79.01] [Z79.01]  Pulmonary embolism (H) [I26.99]         Your next Anticoagulation Clinic appointment(s)     Apr 10, 2018  2:00 PM CDT   [facility]    [address] [place] [address] [phone]              Contact Numbers     Clinic Number:         March 2018 Details    Sun Mon Tue Wed Thu Fri Sat         1               2               3                 4               5               6               7               8               9               10                 11               12               13      7.5 mg   See details      14      7.5 mg         15      7.5 mg         16      7.5 mg         17      7.5 mg           18      5 mg         19      7.5 mg         20      7.5 mg         21      7.5 mg         22      7.5 mg         23      7.5 mg         24      7.5 mg           25      5 mg         26      7.5 mg         27      7.5 mg         28      7.5 mg         29      7.5 mg         30      7.5 mg         31      7.5 mg          Date Details   03/13 This INR check               How to take your warfarin dose     To take:  5 mg Take 1 of the 5 mg tablets.    To take:  7.5 mg Take 1.5 of the 5 mg tablets.           April 2018 Details    Sun Mon Tue Wed Thu Fri Sat     1      5 mg         2      7.5 mg         3      7.5 mg         4      7.5 mg         5      7.5 mg         6      7.5 mg         7      7.5 mg           8      5 mg           Medications:  Continuous Infusions:   heparin (porcine) in D5W 17 Units/kg/hr (03/29/18 0500)     Scheduled Meds:   acetaminophen  1,000 mg Oral Q8H    amitriptyline  50 mg Oral QHS    ampicillin-sulbactim (UNASYN) IVPB  3 g Intravenous Q6H    aspirin  81 mg Oral Daily    atorvastatin  40 mg Oral QHS    folic acid-vit B6-vit B12 2.5-25-2 mg  1 tablet Per OG tube Daily    gabapentin  800 mg Oral QHS    lidocaine   Topical (Top) QID    metoprolol succinate  25 mg Oral Daily    nicotine  1 patch Transdermal Daily    pregabalin  100 mg Oral TID    senna-docusate 8.6-50 mg  1 tablet Oral BID    sodium chloride 0.9%  250 mL Intravenous Once    thiamine  100 mg Oral Daily    ticagrelor  90 mg Oral BID    vancomycin (VANCOCIN) IVPB  1,250 mg Intravenous Q8H     PRN Meds:sodium chloride, cyclobenzaprine, fentaNYL, heparin (PORCINE), heparin (PORCINE), lorazepam, morphine, morphine, morphine, nitroGLYCERIN, oxyCODONE, oxyCODONE, oxyCODONE, polyethylene glycol, sodium chloride 0.9%, zolpidem     Objective:     Vital Signs (Most Recent):  Temp: 98.6 °F (37 °C) (03/29/18 0300)  Pulse: 110 (03/29/18 0645)  Resp: 20 (03/29/18 0645)  BP: 124/76 (03/29/18 0500)  SpO2: 97 % (03/29/18 0645) Vital Signs (24h Range):  Temp:  [98.6 °F (37 °C)-99.2 °F (37.3 °C)] 98.6 °F (37 °C)  Pulse:  [105-128] 110  Resp:  [11-30] 20  SpO2:  [80 %-100 %] 97 %  BP: ()/(52-81) 124/76       Date 03/29/18 0700 - 03/30/18 0659   Shift 4270-1817 0791-4037 9936-2148 24 Hour Total   I  N  T  A  K  E   Shift Total  (mL/kg)       O  U  T  P  U  T   Urine  (mL/kg/hr) 150   150    Shift Total  (mL/kg) 150  (2.2)   150  (2.2)   Weight (kg) 67.8 67.8 67.8 67.8       Physical Exam   Constitutional: He appears well-nourished. He appears lethargic. No distress.   HENT:   Head: Normocephalic and atraumatic.   Eyes: Pupils are equal, round, and reactive to light.   Cardiovascular: Normal rate and regular rhythm.    Pulses:       Femoral pulses are   9      7.5 mg         10            11               12               13               14                 15               16               17               18               19               20               21                 22               23               24               25               26               27               28                 29               30                     Date Details   No additional details    Date of next INR:  4/10/2018         How to take your warfarin dose     To take:  5 mg Take 1 of the 5 mg tablets.    To take:  7.5 mg Take 1.5 of the 5 mg tablets.            2+ on the right side, and 2+ on the left side.  Left: strong biphasic PT   Pulmonary/Chest: Effort normal.   Abdominal: Soft. He exhibits no distension. There is no tenderness.   Musculoskeletal:   R AKA stump dressed  Left lower extremity: dorsal ulcer, entire forefoot dusky edematous ischemia to mid forefoot   Neurological: He appears lethargic.   Left motor: minimal dorsi/plantar flexion left foot   Skin: He is not diaphoretic.   Nursing note and vitals reviewed.      Significant Labs:  BMP:   Recent Labs  Lab 03/29/18 0219   GLU 97      K 4.1      CO2 27   BUN 12   CREATININE 0.8   CALCIUM 8.9   MG 2.4     CBC:   Recent Labs  Lab 03/29/18 0219   WBC 20.53*   RBC 2.60*   HGB 8.2*   HCT 24.3*   *   MCV 94   MCH 31.5*   MCHC 33.7       Significant Diagnostics:  None

## 2018-04-10 ENCOUNTER — ANTICOAGULATION THERAPY VISIT (OUTPATIENT)
Dept: NURSING | Facility: CLINIC | Age: 63
End: 2018-04-10
Payer: COMMERCIAL

## 2018-04-10 DIAGNOSIS — Z79.01 LONG-TERM (CURRENT) USE OF ANTICOAGULANTS: ICD-10-CM

## 2018-04-10 DIAGNOSIS — I26.99 PULMONARY EMBOLISM (H): ICD-10-CM

## 2018-04-10 LAB — INR POINT OF CARE: 2.5 (ref 0.86–1.14)

## 2018-04-10 PROCEDURE — 85610 PROTHROMBIN TIME: CPT | Mod: QW

## 2018-04-10 PROCEDURE — 99207 ZZC NO CHARGE NURSE ONLY: CPT

## 2018-04-10 PROCEDURE — 36416 COLLJ CAPILLARY BLOOD SPEC: CPT

## 2018-04-10 NOTE — MR AVS SNAPSHOT
Harley Alvarado   4/10/2018 2:00 PM   Anticoagulation Therapy Visit    Description:  62 year old male   Provider[facility]  [facility]   Department:  Cr Nurse           INR as of 4/10/2018     Today's INR 2.5      Anticoagulation Summary as of 4/10/2018     INR goal 2.0-3.0   Today's INR 2.5   Full instructions 5 mg on Sun; 7.5 mg all other days   Next INR check 5/8/2018    Indications   Long-term (current) use of anticoagulants [Z79.01] [Z79.01]  Pulmonary embolism (H) [I26.99]         Your next Anticoagulation Clinic appointment(s)     May 08, 2018  2:00 PM CDT   [facility]   [facility]    [address] [place] [address] [phone]              Contact Numbers     Clinic Number:         April 2018 Details    Sun Mon Tue Wed Thu Fri Sat     1               2               3               4               5               6               7                 8               9               10      7.5 mg   See details      11      7.5 mg         12      7.5 mg         13      7.5 mg         14      7.5 mg           15      5 mg         16      7.5 mg         17      7.5 mg         18      7.5 mg         19      7.5 mg         20      7.5 mg         21      7.5 mg           22      5 mg         23      7.5 mg         24      7.5 mg         25      7.5 mg         26      7.5 mg         27      7.5 mg         28      7.5 mg           29      5 mg         30      7.5 mg               Date Details   04/10 This INR check               How to take your warfarin dose     To take:  5 mg Take 1 of the 5 mg tablets.    To take:  7.5 mg Take 1.5 of the 5 mg tablets.           May 2018 Details    Sun Mon Tue Wed Thu Fri Sat       1      7.5 mg         2      7.5 mg         3      7.5 mg         4      7.5 mg         5      7.5 mg           6      5 mg         7      7.5 mg         8            9                10               11               12                 13               14               15               16               17               18               19                 20               21               22               23               24               25               26                 27               28               29               30               31                  Date Details   No additional details    Date of next INR:  5/8/2018         How to take your warfarin dose     To take:  5 mg Take 1 of the 5 mg tablets.    To take:  7.5 mg Take 1.5 of the 5 mg tablets.

## 2018-04-10 NOTE — PROGRESS NOTES
ANTICOAGULATION FOLLOW-UP CLINIC VISIT    Patient Name:  Harley Alvarado  Date:  4/10/2018  Contact Type:  Face to Face    SUBJECTIVE:     Patient Findings     Positives No Problem Findings           OBJECTIVE    INR Protime   Date Value Ref Range Status   04/10/2018 2.5 (A) 0.86 - 1.14 Final       ASSESSMENT / PLAN  INR assessment THER    Recheck INR In: 4 WEEKS    INR Location Clinic      Anticoagulation Summary as of 4/10/2018     INR goal 2.0-3.0   Today's INR 2.5   Maintenance plan 5 mg (5 mg x 1) on Sun; 7.5 mg (5 mg x 1.5) all other days   Full instructions 5 mg on Sun; 7.5 mg all other days   Weekly total 50 mg   No change documented Azul Garber RN   Plan last modified Azul Garber RN (10/10/2017)   Next INR check 5/8/2018   Target end date Indefinite    Indications   Long-term (current) use of anticoagulants [Z79.01] [Z79.01]  Pulmonary embolism (H) [I26.99]         Anticoagulation Episode Summary     INR check location     Preferred lab     Send INR reminders to [facility]    Comments       Anticoagulation Care Providers     Provider Role Specialty Phone number    Cory Acosta PA-C Responsible Physician Assistant [phone]            See the Encounter Report to view Anticoagulation Flowsheet and Dosing Calendar (Go to Encounters tab in chart review, and find the Anticoagulation Therapy Visit)        Azul Garber RN

## 2018-04-19 ENCOUNTER — OFFICE VISIT (OUTPATIENT)
Dept: FAMILY MEDICINE | Facility: CLINIC | Age: 63
End: 2018-04-19
Payer: COMMERCIAL

## 2018-04-19 VITALS
HEIGHT: 66 IN | BODY MASS INDEX: 25.39 KG/M2 | HEART RATE: 64 BPM | RESPIRATION RATE: 18 BRPM | SYSTOLIC BLOOD PRESSURE: 123 MMHG | WEIGHT: 158 LBS | TEMPERATURE: 98.4 F | DIASTOLIC BLOOD PRESSURE: 78 MMHG

## 2018-04-19 DIAGNOSIS — M75.41 IMPINGEMENT SYNDROME, SHOULDER, RIGHT: Primary | ICD-10-CM

## 2018-04-19 PROCEDURE — 99213 OFFICE O/P EST LOW 20 MIN: CPT | Mod: 25 | Performed by: PHYSICIAN ASSISTANT

## 2018-04-19 PROCEDURE — 20610 DRAIN/INJ JOINT/BURSA W/O US: CPT | Performed by: PHYSICIAN ASSISTANT

## 2018-04-19 RX ORDER — TRIAMCINOLONE ACETONIDE 40 MG/ML
40 INJECTION, SUSPENSION INTRA-ARTICULAR; INTRAMUSCULAR ONCE
Qty: 1 ML | Refills: 0 | OUTPATIENT
Start: 2018-04-19 | End: 2018-04-19

## 2018-04-19 NOTE — PROGRESS NOTES
SUBJECTIVE:   Harley Alvarado is a 62 year old male who presents to clinic today for the following health issues:      Joint Pain    Onset: x 2 months    Description:   Location: right arm  Character: shooting pain when extending arm    Intensity: moderate    Progression of Symptoms: better    Accompanying Signs & Symptoms:  Other symptoms: none    History:   Previous similar pain: no       Precipitating factors:   Trauma or overuse: possible overuse from shoveling snow    Alleviating factors:  Improved by: nothing    Therapies Tried and outcome: none      Problem list and histories reviewed & adjusted, as indicated.  Additional history: as documented    Patient Active Problem List   Diagnosis     Allergic conjunctivitis     Dyslipidemia     GERD (gastroesophageal reflux disease)     Latent tuberculosis     Major depression, recurrent (H)     Membranous glomerulonephritis     Nephropathy     Pulmonary embolism (H)     Benign essential hypertension     Dysplastic nevi     Chronic anticoagulation     Long-term (current) use of anticoagulants [Z79.01]     History of dysplastic nevus     Past Surgical History:   Procedure Laterality Date     HERNIA REPAIR  2015       Social History   Substance Use Topics     Smoking status: Former Smoker     Smokeless tobacco: Never Used     Alcohol use No     History reviewed. No pertinent family history.      Current Outpatient Prescriptions   Medication Sig Dispense Refill     ketoconazole (NIZORAL) 2 % cream Apply topically 2 times daily 30 g 1     lisinopril (PRINIVIL/ZESTRIL) 20 MG tablet TAKE 1 TABLET(20 MG) BY MOUTH DAILY 90 tablet 2     pravastatin (PRAVACHOL) 40 MG tablet TAKE 1 TABLET BY MOUTH EVERY NIGHT AT BEDTIME 90 tablet 2     triamcinolone acetonide (KENALOG-40) 40 MG/ML injection 1 mL (40 mg) by INTRA-ARTICULAR route once for 1 dose 1 mL 0     warfarin (COUMADIN) 5 MG tablet Take 5mg every Sun / Take 7.5mg all other days OR as instructed by INR clinic 120 tablet 1  "    No Known Allergies  BP Readings from Last 3 Encounters:   04/19/18 123/78   10/24/17 106/70   10/06/17 114/78    Wt Readings from Last 3 Encounters:   04/19/18 158 lb (71.7 kg)   10/24/17 154 lb (69.9 kg)   10/06/17 154 lb (69.9 kg)                    Reviewed and updated as needed this visit by clinical staff  Tobacco  Allergies  Meds  Problems  Med Hx  Surg Hx  Fam Hx  Soc Hx        Reviewed and updated as needed this visit by Provider  Allergies  Meds  Problems         ROS:  Constitutional, msk, neuro, skin, cardiovascular, pulmonary, gi and gu systems are negative, except as otherwise noted.    OBJECTIVE:     /78 (BP Location: Right arm, Patient Position: Chair, Cuff Size: Adult Large)  Pulse 64  Temp 98.4  F (36.9  C) (Oral)  Resp 18  Ht 5' 6\" (1.676 m)  Wt 158 lb (71.7 kg)  BMI 25.5 kg/m2  Body mass index is 25.5 kg/(m^2).  GENERAL APPEARANCE: healthy, alert and no distress  ORTHO:   SHOULDER Exam-Right   Inspection: no swelling, no bruising, no discoloration, no obvious deformity, no asymmetry, no glenohumeral joint anterior bulge, no distal clavicle elevation, no muscle atrophy, no scapular winging   Tenderness of: SC joint- no, clavicle(prox-mid)- no, clavicle-(mid-distal)- no, AC joint- no, acromion- no, anterior capsule- no, prox bicep tendon- no, greater tuberosity- no, prox humerus- no, supraspinatous- no, infraspinatous- no, superior trapezious- no, rhomboids- no   Range of Motion: Active- forward flexion- normal with pain, abduction- normal with pain, external rotation- normal, internal rotation- normal  Range of Motion: Passive- forward flexion- normal with pain, abduction- normal with pain, external rotation- normal, internal rotation- normal   Strength: forward flexion- 5/5, abduction- 5/5, internal rotation- 5/5, external rotation- 5/5 and bicep- full   Special tests: Neers- POSITIVE, Huang(supraspinatous)- negative, Positive painful arc- POSITIVE, cross arm adduction- " negative, Speeds- negative and empty can negative. Lift off negative           Diagnostic Test Results:  none     ASSESSMENT/PLAN:     (M75.41) Impingement syndrome, shoulder, right  (primary encounter diagnosis)  Comment: felt evident on exam and history. Likely secondary to over use from shoveling. Frozen shoulder is possible, but ROM is not as restricted at this stage in the course as would be expected. Given course length of 2 months as well as limited due to warfarin use (no nsaids), injection was discussed with therapy and ice. Patient agrees. Follow up in 1 month if no improvement. Sooner if worsening.     The procedure was explained to the patient in detail, all questions were answered. An informed consent was obtained. The patient was positioned, the shoulder was draped appropriately. Anatomical landmarks were identified. The skin was prepped with betadine in the usual fashion. Using strict sterile technique a 25 gauge, 1 inch needle was inserted using the lateral approach below the subacromial process. After negative aspiration, 4 cc's (anesthetic) with 40 mg triamcinolone was injected without resistance. Needle was removed-sterile bandage was applied. The patient was observed in the office for 10 minutes without complications. Patient noted 40 (% of improvement) after the procedure. Post injection instructions, including s/s's of complications were discussed. The patient will return prn.     Plan: triamcinolone acetonide (KENALOG-40) 40 MG/ML         injection, DRAIN/INJECT LARGE JOINT/BURSA        -Medication use and side effects discussed with the patient. Patient is in complete understanding and agreement with plan.         Follow up: as above     Cory Acosta PA-C  [facility]

## 2018-04-19 NOTE — MR AVS SNAPSHOT
After Visit Summary   4/19/2018    Harley Alvarado    MRN: [id number]           Patient Information     Date Of Birth          1955        Visit Information        Provider Department      4/19/2018 10:00 AM Cory Acosta PA-C [facility]        Today's Diagnoses     Impingement syndrome, shoulder, right    -  1      Care Instructions      Shoulder Impingement Syndrome  The rotator cuff is a group of muscles and tendons that surround the shoulder joint. These muscles and tendons hold the arm in its joint. They help the shoulder move. The rotator cuff muscles and tendons can become irritated from repeated rubbing against the shoulder bone. This is called shoulder impingement syndrome or rotator cuff tendonitis.     If your case is mild, you may only need to rest the shoulder and then do certain exercises to strengthen the muscles. You can also take anti-inflammatory medicines. Steroid injections into the shoulder can ease inflammation. But you can have only a limited number of these. If the condition gets worse, your shoulder muscles may become thin and weak. This can lead to a rotator cuff tear.  Symptoms of shoulder impingement syndrome may include:    Shoulder pain that gets worse when you raise your arm overhead    Weakness of the shoulder muscles when you use your arm overhead    Popping and clicking when you move your shoulder    Shoulder pain that wakes you up at night, especially when you sleep on the affected shoulder    Sudden pain in your shoulder when you lift or reach  Home care  Follow these tips to take care of yourself at home:    Avoid activities that make your pain worse. These include raising your arms overhead, repeating the same motion over and over, or lifting heavy objects.    Don t hold your arm in one position for a long time. Keep it moving.    Put an ice pack on the sore area for 20 minutes every 1 to 2 hours for the first day. You can make an  ice pack by putting ice cubes in a plastic bag. Wrap the bag in a towel before putting it on your shoulder. A frozen bag of peas or something similar can also be used as an ice pack. Use the ice packs 3 to 4 times a day for the next 2 days. Continue using the ice to relieve of pain and swelling as needed.    You may take acetaminophen or ibuprofen to control pain, unless another medicine was prescribed. If prednisone was prescribed, don t take anti-inflammatory medicines. If you have chronic liver or kidney disease or ever had a stomach ulcer or gastrointestinal bleeding, talk with your doctor before using these medicines.    After your symptoms ease, you may get physical therapy or start a home exercise program. This can strengthen your shoulder muscles and help your range of motion. Talk with your doctor about what is best for your condition.  Follow-up care  Follow up with your healthcare provider, or as advised.  When to seek medical advice  Call your healthcare provider right away if any of these occur:    Shoulder pain that gets worse and wakes you up at night    Your shoulder or arm swells    Numbness, tingling, or pain that travels down the arm to the hand    Loss of shoulder strength    Fever or chills  Date Last Reviewed: 8/1/2016 2000-2017 [employer]. [address] [place] [address]. All rights reserved. This information is not intended as a substitute for professional medical care. Always follow your healthcare professional's instructions.                Follow-ups after your visit        Additional Services     [facility] PT, HAND, AND CHIROPRACTIC REFERRAL       **This order will print in the [facility] Scheduling Office**    Physical Therapy, Hand Therapy and Chiropractic Care are available through:    *[facility]  *[facility]  *[facility]    Call one number to schedule at any of the above locations: [phone].    Your provider  has referred you to: Physical Therapy at [facility] or [facility]    Indication/Reason for Referral: right shoulder pain  Onset of Illness: 2 months  Therapy Orders: Evaluate and Treat  Special Programs: None  Special Request: None    Kaity Briceno      Additional Comments for the Therapist or Chiropractor:     Please be aware that coverage of these services is subject to the terms and limitations of your health insurance plan.  Call member services at your health plan with any benefit or coverage questions.      Please bring the following to your appointment:    *Your personal calendar for scheduling future appointments  *Comfortable clothing                  Your next 10 appointments already scheduled     May 08, 2018  2:00 PM CDT   [facility]    [address] [place] [id number] [phone]              Who to contact     If you have questions or need follow up information about today's clinic visit or your schedule please contact [facility] directly at [phone].  Normal or non-critical lab and imaging results will be communicated to you by [employer], letter or phone within 4 business days after the clinic has received the results. If you do not hear from us within 7 days, please contact the clinic through Cant[employer] or phone. If you have a critical or abnormal lab result, we will notify you by phone as soon as possible.  Submit refill requests through [employer] or call your pharmacy and they will forward the refill request to us. Please allow 3 business days for your refill to be completed.          Additional Information About Your Visit        Cant[employer]hart Information     [employer] gives you secure access to your electronic health record. If you see a primary care provider, you can also send messages to your care team and make appointments. If you have questions, please call your primary  "care clinic.  If you do not have a primary care provider, please call [phone] and they will assist you.        Care EveryWhere ID     This is your Care EveryWhere ID. This could be used by other organizations to access your [facility] medical records  [id number]        Your Vitals Were     Pulse Temperature Respirations Height BMI (Body Mass Index)       64 98.4  F (36.9  C) (Oral) 18 5' 6\" (1.676 m) 25.5 kg/m2        Blood Pressure from Last 3 Encounters:   04/19/18 123/78   10/24/17 106/70   10/06/17 114/78    Weight from Last 3 Encounters:   04/19/18 158 lb (71.7 kg)   10/24/17 154 lb (69.9 kg)   10/06/17 154 lb (69.9 kg)              We Performed the Following     DRAIN/INJECT LARGE JOINT/BURSA     [facility] PT, HAND, AND CHIROPRACTIC REFERRAL          Today's Medication Changes          These changes are accurate as of 4/19/18 11:07 AM.  If you have any questions, ask your nurse or doctor.               Start taking these medicines.        Dose/Directions    triamcinolone acetonide 40 MG/ML injection   Commonly known as:  KENALOG-40   Used for:  Impingement syndrome, shoulder, right   Started by:  Cory Acosta PA-C        Dose:  40 mg   1 mL (40 mg) by INTRA-ARTICULAR route once for 1 dose   Quantity:  1 mL   Refills:  0            Where to get your medicines      Some of these will need a paper prescription and others can be bought over the counter.  Ask your nurse if you have questions.     You don't need a prescription for these medications     triamcinolone acetonide 40 MG/ML injection                Primary Care Provider Office Phone # Fax #    Cory Acosta PA-C [phone] [address] [facility] [id number]        Equal Access to Services     JAMES LEE AH: Gerber Tate, waannalisa arora, qaybta kaalmada essie diaz. So [facility] [phone].    ATENCIÓN: Si habla español, tiene a dubon disposición servicios " mala de asistencia lingüística. Aide quiroga [phone].    We comply with applicable federal civil rights laws and [place] laws. We do not discriminate on the basis of race, color, national origin, age, disability, sex, sexual orientation, or gender identity.            Thank you!     Thank you for choosing [facility]  for your care. Our goal is always to provide you with excellent care. Hearing back from our patients is one way we can continue to improve our services. Please take a few minutes to complete the written survey that you may receive in the mail after your visit with us. Thank you!             Your Updated Medication List - Protect others around you: Learn how to safely use, store and throw away your medicines at www.disposemymeds.org.          This list is accurate as of 4/19/18 11:07 AM.  Always use your most recent med list.                   Brand Name Dispense Instructions for use Diagnosis    ketoconazole 2 % cream    NIZORAL    30 g    Apply topically 2 times daily    Seborrheic dermatitis       lisinopril 20 MG tablet    PRINIVIL/ZESTRIL    90 tablet    TAKE 1 TABLET(20 MG) BY MOUTH DAILY    Membranous glomerulonephritis, Benign essential hypertension       pravastatin 40 MG tablet    PRAVACHOL    90 tablet    TAKE 1 TABLET BY MOUTH EVERY NIGHT AT BEDTIME    Dyslipidemia       triamcinolone acetonide 40 MG/ML injection    KENALOG-40    1 mL    1 mL (40 mg) by INTRA-ARTICULAR route once for 1 dose    Impingement syndrome, shoulder, right       warfarin 5 MG tablet    COUMADIN    120 tablet    Take 5mg every Sun / Take 7.5mg all other days OR as instructed by INR clinic    Long-term (current) use of anticoagulants

## 2018-04-19 NOTE — PATIENT INSTRUCTIONS
Shoulder Impingement Syndrome  The rotator cuff is a group of muscles and tendons that surround the shoulder joint. These muscles and tendons hold the arm in its joint. They help the shoulder move. The rotator cuff muscles and tendons can become irritated from repeated rubbing against the shoulder bone. This is called shoulder impingement syndrome or rotator cuff tendonitis.     If your case is mild, you may only need to rest the shoulder and then do certain exercises to strengthen the muscles. You can also take anti-inflammatory medicines. Steroid injections into the shoulder can ease inflammation. But you can have only a limited number of these. If the condition gets worse, your shoulder muscles may become thin and weak. This can lead to a rotator cuff tear.  Symptoms of shoulder impingement syndrome may include:    Shoulder pain that gets worse when you raise your arm overhead    Weakness of the shoulder muscles when you use your arm overhead    Popping and clicking when you move your shoulder    Shoulder pain that wakes you up at night, especially when you sleep on the affected shoulder    Sudden pain in your shoulder when you lift or reach  Home care  Follow these tips to take care of yourself at home:    Avoid activities that make your pain worse. These include raising your arms overhead, repeating the same motion over and over, or lifting heavy objects.    Don t hold your arm in one position for a long time. Keep it moving.    Put an ice pack on the sore area for 20 minutes every 1 to 2 hours for the first day. You can make an ice pack by putting ice cubes in a plastic bag. Wrap the bag in a towel before putting it on your shoulder. A frozen bag of peas or something similar can also be used as an ice pack. Use the ice packs 3 to 4 times a day for the next 2 days. Continue using the ice to relieve of pain and swelling as needed.    You may take acetaminophen or ibuprofen to control pain, unless another  medicine was prescribed. If prednisone was prescribed, don t take anti-inflammatory medicines. If you have chronic liver or kidney disease or ever had a stomach ulcer or gastrointestinal bleeding, talk with your doctor before using these medicines.    After your symptoms ease, you may get physical therapy or start a home exercise program. This can strengthen your shoulder muscles and help your range of motion. Talk with your doctor about what is best for your condition.  Follow-up care  Follow up with your healthcare provider, or as advised.  When to seek medical advice  Call your healthcare provider right away if any of these occur:    Shoulder pain that gets worse and wakes you up at night    Your shoulder or arm swells    Numbness, tingling, or pain that travels down the arm to the hand    Loss of shoulder strength    Fever or chills  Date Last Reviewed: 8/1/2016 2000-2017 [employer]. [address] [place] [address], [place] [address]. All rights reserved. This information is not intended as a substitute for professional medical care. Always follow your healthcare professional's instructions.

## 2018-04-23 ENCOUNTER — THERAPY VISIT (OUTPATIENT)
Dept: PHYSICAL THERAPY | Facility: CLINIC | Age: 63
End: 2018-04-23
Payer: COMMERCIAL

## 2018-04-23 DIAGNOSIS — M25.511 CHRONIC RIGHT SHOULDER PAIN: Primary | ICD-10-CM

## 2018-04-23 DIAGNOSIS — G89.29 CHRONIC RIGHT SHOULDER PAIN: Primary | ICD-10-CM

## 2018-04-23 PROCEDURE — 97161 PT EVAL LOW COMPLEX 20 MIN: CPT | Mod: GP | Performed by: PHYSICAL THERAPIST

## 2018-04-23 PROCEDURE — 97110 THERAPEUTIC EXERCISES: CPT | Mod: GP | Performed by: PHYSICAL THERAPIST

## 2018-04-23 NOTE — PROGRESS NOTES
[facility] Initial Evaluation  Subjective:  Patient is a 62 year old male presenting with rehab left shoulder hpi. No  was used.   Harley Alvarado is a 62 year old male with a right shoulder condition.  Condition occurred with:  Lifting (shoveling snow).  Condition occurred: at home.  This is a chronic condition  Pt reports having R shoulder pain that has been present for greater than 2 months after shoveling snow.  MD appt on 4/19/17..    Patient reports pain:  Upper arm and in the joint.  Radiates to:  Upper arm.  Pain is described as aching  and reported as 4/10.  Associated symptoms:  Loss of motion/stiffness and loss of strength. Pain is worse during the day.  Symptoms are exacerbated by using arm overhead, using arm behind back and using arm at shoulder level and relieved by rest.  Since onset symptoms are gradually improving (after injection on 4/19/18 MD appt.).    Previous treatment includes other (injection on 4/19/18).  There was significant improvement following previous treatment.  General health as reported by patient is good.  Pertinent medical history includes:  High blood pressure and kidney disease.  Medical allergies: no.  Other surgeries include:  None reported.  Current medications:  Heparin/coumadin, steroids and high blood pressure medication.  Current occupation is   .  Patient is working in normal job without restrictions.  Primary job tasks include:  Prolonged sitting and driving.    Barriers include:  None as reported by the patient.    Red flags:  None as reported by the patient.                        Objective:  Standing Alignment:      Shoulder/UE:  Rounded shoulders                  Flexibility/Screens:   Positive screens:  ShoulderNegative screens: Cervical                              Shoulder Evaluation:  ROM:  AROM:  normal                            PROM:  normal                            Pain: ERP with combined Ext/IR and  eccentric flexion    Strength:    Flexion: Left:4/5    Pain: -    Right: 4/5      Pain:  +    Abduction:  Left: 4/5   Pain:-    Right: 4/5      Pain:+    Internal Rotation:  Left:4/5      Pain:-    Right: 4/5      Pain:-  External Rotation:   Left:4/5      Pain:-   Right:4/5      Pain:-/+            Stability Testing:  normal      Special Tests:      Left shoulder negative for the following special tests:  Impingement; Bursal and Rotator cuff tear  Right shoulder positive for the following special tests:Impingement  Right shoulder negative for the following special tests:Bursal and Rotator cuff tear  Palpation:  normal      Mobility Tests:  normal                                                 General     ROS    Assessment/Plan:    Patient is a 62 year old male with right side shoulder complaints.    Patient has the following significant findings with corresponding treatment plan.                Diagnosis 1:  R shoulder pain  Pain -  self management, education, directional preference exercise and home program  Decreased strength - therapeutic exercise and therapeutic activities  Impaired muscle performance - neuro re-education  Decreased function - therapeutic activities    Therapy Evaluation Codes:   1) History comprised of:   Personal factors that impact the plan of care:      None.    Comorbidity factors that impact the plan of care are:      High blood pressure.     Medications impacting care: High blood pressure, Heparin/coumadin and Steroids.  2) Examination of Body Systems comprised of:   Body structures and functions that impact the plan of care:      Shoulder.   Activity limitations that impact the plan of care are:      Dressing and Lifting.  3) Clinical presentation characteristics are:   Stable/Uncomplicated.  4) Decision-Making    Low complexity using standardized patient assessment instrument and/or measureable assessment of functional outcome.  Cumulative Therapy Evaluation is: Low  complexity.    Previous and current functional limitations:  (See Goal Flow Sheet for this information)    Short term and Long term goals: (See Goal Flow Sheet for this information)     Communication ability:  Patient appears to be able to clearly communicate and understand verbal and written communication and follow directions correctly.  Treatment Explanation - The following has been discussed with the patient:   RX ordered/plan of care  Anticipated outcomes  Possible risks and side effects  This patient would benefit from PT intervention to resume normal activities.   Rehab potential is good.    Frequency:  1 X week, once daily  Duration:  for 6 weeks  Discharge Plan:  Achieve all LTG.  Independent in home treatment program.  Reach maximal therapeutic benefit.    Please refer to the daily flowsheet for treatment today, total treatment time and time spent performing 1:1 timed codes.

## 2018-04-23 NOTE — MR AVS SNAPSHOT
After Visit Summary   4/23/2018    Harley Alvarado    MRN: [id number]           Patient Information     Date Of Birth          1955        Visit Information        Provider Department      4/23/2018 2:10 PM Harley Meadows PT [facility] Physical Therapy        Today's Diagnoses     Chronic right shoulder pain    -  1       Follow-ups after your visit        Your next 10 appointments already scheduled     May 08, 2018  2:00 PM CDT   Anticoagulation Visit with  ANTICOAGULATION CLINIC   [facility]    [address] [place] [id number] [phone]              Who to contact     If you have questions or need follow up information about today's clinic visit or your schedule please contact [facility] PHYSICAL THERAPY directly at [phone].  Normal or non-critical lab and imaging results will be communicated to you by WellGenhart, letter or phone within 4 business days after the clinic has received the results. If you do not hear from us within 7 days, please contact the clinic through WellGenhart or phone. If you have a critical or abnormal lab result, we will notify you by phone as soon as possible.  Submit refill requests through Kidzloop or call your pharmacy and they will forward the refill request to us. Please allow 3 business days for your refill to be completed.          Additional Information About Your Visit        MyChart Information     Kidzloop gives you secure access to your electronic health record. If you see a primary care provider, you can also send messages to your care team and make appointments. If you have questions, please call your primary care clinic.  If you do not have a primary care provider, please call [phone] and they will assist you.        Care EveryWhere ID     This is your Care EveryWhere ID. This could be used by other  organizations to access your [facility] medical records  [id number]         Blood Pressure from Last 3 Encounters:   04/19/18 123/78   10/24/17 106/70   10/06/17 114/78    Weight from Last 3 Encounters:   04/19/18 71.7 kg (158 lb)   10/24/17 69.9 kg (154 lb)   10/06/17 69.9 kg (154 lb)              We Performed the Following     HC PT EVAL, LOW COMPLEXITY     JOE INITIAL EVAL REPORT     THERAPEUTIC EXERCISES        Primary Care Provider Office Phone # Fax #    Cory Waldemar Acosta PA-C [phone] [address] [facility] [address]        Equal Access to Services     TITUS LEE : Hadii arlette berkowitz Soayden, wachepeda ulysses, qaybta kaalmada adeheatheryajania, essie quan. So [facility] [phone].    ATENCIÓN: Si habla español, tiene a dubon disposición servicios gratuitos de asistencia lingüística. Llame al [phone].    We comply with applicable federal civil rights laws and [place] laws. We do not discriminate on the basis of race, color, national origin, age, disability, sex, sexual orientation, or gender identity.            Thank you!     Thank you for choosing [facility] PHYSICAL THERAP[facility]  for your care. Our goal is always to provide you with excellent care. Hearing back from our patients is one way we can continue to improve our services. Please take a few minutes to complete the written survey that you may receive in the mail after your visit with us. Thank you!             Your Updated Medication List - Protect others around you: Learn how to safely use, store and throw away your medicines at www.disposemymeds.org.          This list is accurate as of 4/23/18  2:48 PM.  Always use your most recent med list.                   Brand Name Dispense Instructions for use Diagnosis    ketoconazole 2 % cream    NIZORAL    30 g    Apply topically 2 times daily    Seborrheic dermatitis       lisinopril 20 MG tablet     PRINIVIL/ZESTRIL    90 tablet    TAKE 1 TABLET(20 MG) BY MOUTH DAILY    Membranous glomerulonephritis, Benign essential hypertension       pravastatin 40 MG tablet    PRAVACHOL    90 tablet    TAKE 1 TABLET BY MOUTH EVERY NIGHT AT BEDTIME    Dyslipidemia       warfarin 5 MG tablet    COUMADIN    120 tablet    Take 5mg every Sun / Take 7.5mg all other days OR as instructed by INR clinic    Long-term (current) use of anticoagulants

## 2018-05-08 ENCOUNTER — ANTICOAGULATION THERAPY VISIT (OUTPATIENT)
Dept: NURSING | Facility: CLINIC | Age: 63
End: 2018-05-08
Payer: COMMERCIAL

## 2018-05-08 DIAGNOSIS — Z79.01 LONG-TERM (CURRENT) USE OF ANTICOAGULANTS: ICD-10-CM

## 2018-05-08 DIAGNOSIS — I26.99 PULMONARY EMBOLISM (H): ICD-10-CM

## 2018-05-08 LAB — INR POINT OF CARE: 2.6 (ref 0.86–1.14)

## 2018-05-08 PROCEDURE — 99207 ZZC NO CHARGE NURSE ONLY: CPT

## 2018-05-08 PROCEDURE — 85610 PROTHROMBIN TIME: CPT | Mod: QW

## 2018-05-08 PROCEDURE — 36416 COLLJ CAPILLARY BLOOD SPEC: CPT

## 2018-05-08 NOTE — PROGRESS NOTES
ANTICOAGULATION FOLLOW-UP CLINIC VISIT    Patient Name:  Harley Alvarado  Date:  5/8/2018  Contact Type:  Face to Face    SUBJECTIVE:     Patient Findings     Positives No Problem Findings           OBJECTIVE    INR Protime   Date Value Ref Range Status   05/08/2018 2.6 (A) 0.86 - 1.14 Final       ASSESSMENT / PLAN  INR assessment THER    Recheck INR In: 4 WEEKS    INR Location Clinic      Anticoagulation Summary as of 5/8/2018     INR goal 2.0-3.0   Today's INR 2.6   Maintenance plan 5 mg (5 mg x 1) on Sun; 7.5 mg (5 mg x 1.5) all other days   Full instructions 5 mg on Sun; 7.5 mg all other days   Weekly total 50 mg   No change documented Azul Garber RN   Plan last modified Azul Garber RN (10/10/2017)   Next INR check 6/4/2018   Target end date Indefinite    Indications   Long-term (current) use of anticoagulants [Z79.01] [Z79.01]  Pulmonary embolism (H) [I26.99]         Anticoagulation Episode Summary     INR check location     Preferred lab     Send INR reminders to [facility] CLINIC    Comments       Anticoagulation Care Providers     Provider Role Specialty Phone number    Cory Acosta PA-C Responsible Physician Assistant [phone]            See the Encounter Report to view Anticoagulation Flowsheet and Dosing Calendar (Go to Encounters tab in chart review, and find the Anticoagulation Therapy Visit)        Azul Garber RN

## 2018-05-08 NOTE — MR AVS SNAPSHOT
Harley Alvarado   5/8/2018 2:00 PM   Anticoagulation Therapy Visit    Description:  62 year old male   Provider:  CR ANTICOAGULATION CLINIC   Department:  Cr Nurse           INR as of 5/8/2018     Today's INR 2.6      Anticoagulation Summary as of 5/8/2018     INR goal 2.0-3.0   Today's INR 2.6   Full instructions 5 mg on Sun; 7.5 mg all other days   Next INR check 6/4/2018    Indications   Long-term (current) use of anticoagulants [Z79.01] [Z79.01]  Pulmonary embolism (H) [I26.99]         Your next Anticoagulation Clinic appointment(s)     Jun 04, 2018  1:00 PM CDT   Anticoagulation Visit with [facility] ANTICOAGULATION CLINIC   [facility]    [address] [place] [id number] [phone]              Contact Numbers     Clinic Number:         May 2018 Details    Sun Mon Tue Wed Thu Fri Sat       1               2               3               4               5                 6               7               8      7.5 mg   See details      9      7.5 mg         10      7.5 mg         11      7.5 mg         12      7.5 mg           13      5 mg         14      7.5 mg         15      7.5 mg         16      7.5 mg         17      7.5 mg         18      7.5 mg         19      7.5 mg           20      5 mg         21      7.5 mg         22      7.5 mg         23      7.5 mg         24      7.5 mg         25      7.5 mg         26      7.5 mg           27      5 mg         28      7.5 mg         29      7.5 mg         30      7.5 mg         31      7.5 mg            Date Details   05/08 This INR check               How to take your warfarin dose     To take:  5 mg Take 1 of the 5 mg tablets.    To take:  7.5 mg Take 1.5 of the 5 mg tablets.           June 2018 Details    Sun Mon Tue Wed Thu Fri Sat          1      7.5 mg         2      7.5 mg           3      5 mg         4            5               6               7               8               9                  10               11               12               13               14               15               16                 17               18               19               20               21               22               23                 24               25               26               27               28               29               30                Date Details   No additional details    Date of next INR:  6/4/2018         How to take your warfarin dose     To take:  5 mg Take 1 of the 5 mg tablets.    To take:  7.5 mg Take 1.5 of the 5 mg tablets.

## 2018-05-30 ENCOUNTER — OFFICE VISIT (OUTPATIENT)
Dept: FAMILY MEDICINE | Facility: CLINIC | Age: 63
End: 2018-05-30
Payer: COMMERCIAL

## 2018-05-30 VITALS
SYSTOLIC BLOOD PRESSURE: 90 MMHG | HEIGHT: 66 IN | TEMPERATURE: 98 F | DIASTOLIC BLOOD PRESSURE: 58 MMHG | HEART RATE: 72 BPM | WEIGHT: 157 LBS | OXYGEN SATURATION: 97 % | BODY MASS INDEX: 25.23 KG/M2 | RESPIRATION RATE: 12 BRPM

## 2018-05-30 DIAGNOSIS — D22.9 SUSPICIOUS NEVUS: Primary | ICD-10-CM

## 2018-05-30 PROCEDURE — 88305 TISSUE EXAM BY PATHOLOGIST: CPT | Performed by: FAMILY MEDICINE

## 2018-05-30 PROCEDURE — 11100 HC BIOPSY SKIN/SUBQ/MUC MEM, SINGLE LESION: CPT | Performed by: FAMILY MEDICINE

## 2018-05-30 ASSESSMENT — ANXIETY QUESTIONNAIRES
2. NOT BEING ABLE TO STOP OR CONTROL WORRYING: NOT AT ALL
7. FEELING AFRAID AS IF SOMETHING AWFUL MIGHT HAPPEN: NOT AT ALL
6. BECOMING EASILY ANNOYED OR IRRITABLE: NOT AT ALL
5. BEING SO RESTLESS THAT IT IS HARD TO SIT STILL: NOT AT ALL
3. WORRYING TOO MUCH ABOUT DIFFERENT THINGS: NOT AT ALL
GAD7 TOTAL SCORE: 0
1. FEELING NERVOUS, ANXIOUS, OR ON EDGE: NOT AT ALL
IF YOU CHECKED OFF ANY PROBLEMS ON THIS QUESTIONNAIRE, HOW DIFFICULT HAVE THESE PROBLEMS MADE IT FOR YOU TO DO YOUR WORK, TAKE CARE OF THINGS AT HOME, OR GET ALONG WITH OTHER PEOPLE: NOT DIFFICULT AT ALL

## 2018-05-30 ASSESSMENT — PATIENT HEALTH QUESTIONNAIRE - PHQ9: 5. POOR APPETITE OR OVEREATING: NOT AT ALL

## 2018-05-30 NOTE — PROGRESS NOTES
"  SUBJECTIVE:   Harley Alvarado is a 62 year old male who presents to clinic today for the following health issues:    Derm Concern      Duration: 5 days    Description (location/character/radiation): red spot on rt arm that appeared    Intensity:  mild    Accompanying signs and symptoms: negative for itching or pain, does not recall any injury to area    History (similar episodes/previous evaluation): None    Precipitating or alleviating factors: None    Therapies tried and outcome: None       Problem list and histories reviewed & adjusted, as indicated.  Additional history: as documented    Patient Active Problem List   Diagnosis     Allergic conjunctivitis     Dyslipidemia     GERD (gastroesophageal reflux disease)     Latent tuberculosis     Major depression, recurrent (H)     Membranous glomerulonephritis     Nephropathy     Pulmonary embolism (H)     Benign essential hypertension     Dysplastic nevi     Chronic anticoagulation     Long-term (current) use of anticoagulants [Z79.01]     History of dysplastic nevus     Chronic right shoulder pain     Past Surgical History:   Procedure Laterality Date     HERNIA REPAIR  2015       Social History   Substance Use Topics     Smoking status: Former Smoker     Smokeless tobacco: Never Used     Alcohol use No     History reviewed. No pertinent family history.        Reviewed and updated as needed this visit by clinical staff       Reviewed and updated as needed this visit by Provider         ROS:  Constitutional, HEENT, cardiovascular, pulmonary, gi and gu systems are negative, except as otherwise noted.    OBJECTIVE:     BP 90/58 (BP Location: Right arm, Patient Position: Chair, Cuff Size: Adult Regular)  Pulse 72  Temp 98  F (36.7  C) (Oral)  Resp 12  Ht 5' 6\" (1.676 m)  Wt 157 lb (71.2 kg)  SpO2 97%  BMI 25.34 kg/m2  Body mass index is 25.34 kg/(m^2).  GENERAL: healthy, alert and no distress  SKIN: Right forearm with a slightly vascular appearing " hyperpigmented lesion measuring 5-6mm in diameter, does not mya, irregular borders    PROCEDURE: Verbal consent obtained.  Skin anesthetized with 2% lidocaine with epi and cleansed with betadine.  2mm punch biopsy tool was used to sample a small area of the lesion.  Bleeding was controlled with pressure.  Dressed with a Band Aid.  Patient tolerated procedure well     ASSESSMENT/PLAN:     1. Suspicious nevus  - Punch biopsy done in office today since lesion is so acute and is growing  - Will base further treatment on pathology results   - Surgical pathology exam  - BIOPSY SKIN/SUBQ/MUC MEM, SINGLE LESION    Follow up in 1 week for biopsy results    Corie Mccann DO  [facility]

## 2018-05-30 NOTE — MR AVS SNAPSHOT
After Visit Summary   5/30/2018    Harley Alvarado    MRN: [id number]           Patient Information     Date Of Birth          1955        Visit Information        Provider Department      5/30/2018 1:40 PM Corie Mccann, DO [facility]        Today's Diagnoses     Suspicious nevus    -  1       Follow-ups after your visit        Follow-up notes from your care team     Return in about 1 week (around 6/6/2018).      Your next 10 appointments already scheduled     Jun 04, 2018  1:00 PM CDT   Anticoagulation Visit with CR ANTICOAGULATION CLINIC   [facility]    [address] [place] [id number] [phone]              Who to contact     If you have questions or need follow up information about today's clinic visit or your schedule please contact [facility] directly at [phone].  Normal or non-critical lab and imaging results will be communicated to you by MyChart, letter or phone within 4 business days after the clinic has received the results. If you do not hear from us within 7 days, please contact the clinic through MyChart or phone. If you have a critical or abnormal lab result, we will notify you by phone as soon as possible.  Submit refill requests through Chicory or call your pharmacy and they will forward the refill request to us. Please allow 3 business days for your refill to be completed.          Additional Information About Your Visit        MyChart Information     Chicory gives you secure access to your electronic health record. If you see a primary care provider, you can also send messages to your care team and make appointments. If you have questions, please call your primary care clinic.  If you do not have a primary care provider, please call [phone] and they will assist you.        Care EveryWhere ID     This is your Care EveryWhere ID. This could be used by  "other organizations to access your [place] medical records  [id number]        Your Vitals Were     Pulse Temperature Respirations Height Pulse Oximetry BMI (Body Mass Index)    72 98  F (36.7  C) (Oral) 12 5' 6\" (1.676 m) 97% 25.34 kg/m2       Blood Pressure from Last 3 Encounters:   05/30/18 90/58   04/19/18 123/78   10/24/17 106/70    Weight from Last 3 Encounters:   05/30/18 157 lb (71.2 kg)   04/19/18 158 lb (71.7 kg)   10/24/17 154 lb (69.9 kg)              We Performed the Following     BIOPSY SKIN/SUBQ/MUC MEM, SINGLE LESION     Surgical pathology exam          Today's Medication Changes          These changes are accurate as of 5/30/18  2:40 PM.  If you have any questions, ask your nurse or doctor.               Stop taking these medicines if you haven't already. Please contact your care team if you have questions.     ketoconazole 2 % cream   Commonly known as:  NIZORAL   Stopped by:  Corie Mccann,                     Primary Care Provider Office Phone # Fax #    Cory Waldemar Acosta PA-C [phone] [address] [facility] [address]        Equal Access to Services     TITUS LEE AH: Hadii arlette art hadasho Sopoali, waaxda luqadaha, qaybta kaalmada adeegyada, essie quan. So [facility] [phone].    ATENCIÓN: Si habla español, tiene a dubon disposición servicios gratuitos de asistencia lingüística. Llhollis al [phone].    We comply with applicable federal civil rights laws and [place] laws. We do not discriminate on the basis of race, color, national origin, age, disability, sex, sexual orientation, or gender identity.            Thank you!     Thank you for choosing [facility]  for your care. Our goal is always to provide you with excellent care. Hearing back from our patients is one way we can continue to improve our services. Please take a few minutes to complete the written survey that you may receive in the mail after " your visit with us. Thank you!             Your Updated Medication List - Protect others around you: Learn how to safely use, store and throw away your medicines at www.disposemymeds.org.          This list is accurate as of 5/30/18  2:40 PM.  Always use your most recent med list.                   Brand Name Dispense Instructions for use Diagnosis    lisinopril 20 MG tablet    PRINIVIL/ZESTRIL    90 tablet    TAKE 1 TABLET(20 MG) BY MOUTH DAILY    Membranous glomerulonephritis, Benign essential hypertension       pravastatin 40 MG tablet    PRAVACHOL    90 tablet    TAKE 1 TABLET BY MOUTH EVERY NIGHT AT BEDTIME    Dyslipidemia       warfarin 5 MG tablet    COUMADIN    120 tablet    Take 5mg every Sun / Take 7.5mg all other days OR as instructed by INR clinic    Long-term (current) use of anticoagulants

## 2018-05-31 ASSESSMENT — ANXIETY QUESTIONNAIRES: GAD7 TOTAL SCORE: 0

## 2018-05-31 ASSESSMENT — PATIENT HEALTH QUESTIONNAIRE - PHQ9: SUM OF ALL RESPONSES TO PHQ QUESTIONS 1-9: 0

## 2018-06-04 ENCOUNTER — ANTICOAGULATION THERAPY VISIT (OUTPATIENT)
Dept: NURSING | Facility: CLINIC | Age: 63
End: 2018-06-04
Payer: COMMERCIAL

## 2018-06-04 DIAGNOSIS — I26.99 PULMONARY EMBOLISM (H): ICD-10-CM

## 2018-06-04 DIAGNOSIS — Z79.01 LONG-TERM (CURRENT) USE OF ANTICOAGULANTS: ICD-10-CM

## 2018-06-04 LAB
COPATH REPORT: NORMAL
INR POINT OF CARE: 2.3 (ref 0.86–1.14)

## 2018-06-04 PROCEDURE — 99207 ZZC NO CHARGE NURSE ONLY: CPT

## 2018-06-04 PROCEDURE — 36416 COLLJ CAPILLARY BLOOD SPEC: CPT

## 2018-06-04 PROCEDURE — 85610 PROTHROMBIN TIME: CPT | Mod: QW

## 2018-06-04 NOTE — MR AVS SNAPSHOT
Harley Alvarado   6/4/2018 1:00 PM   Anticoagulation Therapy Visit    Description:  62 year old male   Provider:  CR ANTICOAGULATION CLINIC   Department:  Cr Nurse           INR as of 6/4/2018     Today's INR 2.3      Anticoagulation Summary as of 6/4/2018     INR goal 2.0-3.0   Today's INR 2.3   Full warfarin instructions 5 mg on Sun; 7.5 mg all other days   Next INR check 7/2/2018    Indications   Long-term (current) use of anticoagulants [Z79.01] [Z79.01]  Pulmonary embolism (H) [I26.99]         Your next Anticoagulation Clinic appointment(s)     Jul 02, 2018  1:00 PM CDT   Anticoagulation Visit with CR ANTICOAGULATION CLINIC   [facility]    [address] [place] [id number] [phone]              Contact Numbers     Clinic Number:         June 2018 Details    Sun Mon Tue Wed Thu Fri Sat          1               2                 3               4      7.5 mg   See details      5      7.5 mg         6      7.5 mg         7      7.5 mg         8      7.5 mg         9      7.5 mg           10      5 mg         11      7.5 mg         12      7.5 mg         13      7.5 mg         14      7.5 mg         15      7.5 mg         16      7.5 mg           17      5 mg         18      7.5 mg         19      7.5 mg         20      7.5 mg         21      7.5 mg         22      7.5 mg         23      7.5 mg           24      5 mg         25      7.5 mg         26      7.5 mg         27      7.5 mg         28      7.5 mg         29      7.5 mg         30      7.5 mg          Date Details   06/04 This INR check               How to take your warfarin dose     To take:  5 mg Take 1 of the 5 mg tablets.    To take:  7.5 mg Take 1.5 of the 5 mg tablets.           July 2018 Details    Sun Mon Tue Wed Thu Fri Sat     1      5 mg         2            3               4               5               6               7                 8               9                10               11               12               13               14                 15               16               17               18               19               20               21                 22               23               24               25               26               27               28                 29               30               31                    Date Details   No additional details    Date of next INR:  7/2/2018         How to take your warfarin dose     To take:  5 mg Take 1 of the 5 mg tablets.    To take:  7.5 mg Take 1.5 of the 5 mg tablets.

## 2018-06-04 NOTE — PROGRESS NOTES
ANTICOAGULATION FOLLOW-UP CLINIC VISIT    Patient Name:  Harley Alvarado  Date:  6/4/2018  Contact Type:  Face to Face    SUBJECTIVE:     Patient Findings     Positives No Problem Findings           OBJECTIVE    INR Protime   Date Value Ref Range Status   06/04/2018 2.3 (A) 0.86 - 1.14 Final       ASSESSMENT / PLAN  INR assessment THER    Recheck INR In: 4 WEEKS    INR Location Clinic      Anticoagulation Summary as of 6/4/2018     INR goal 2.0-3.0   Today's INR 2.3   Warfarin maintenance plan 5 mg (5 mg x 1) on Sun; 7.5 mg (5 mg x 1.5) all other days   Full warfarin instructions 5 mg on Sun; 7.5 mg all other days   Weekly warfarin total 50 mg   No change documented Azul Garber RN   Plan last modified Azul Garber RN (10/10/2017)   Next INR check 7/2/2018   Target end date Indefinite    Indications   Long-term (current) use of anticoagulants [Z79.01] [Z79.01]  Pulmonary embolism (H) [I26.99]         Anticoagulation Episode Summary     INR check location     Preferred lab     Send INR reminders to [facility]    Comments       Anticoagulation Care Providers     Provider Role Specialty Phone number    Cory Acosta PA-C Responsible Physician Assistant [phone]            See the Encounter Report to view Anticoagulation Flowsheet and Dosing Calendar (Go to Encounters tab in chart review, and find the Anticoagulation Therapy Visit)        Azul Garber RN

## 2018-06-25 ENCOUNTER — ANTICOAGULATION THERAPY VISIT (OUTPATIENT)
Dept: ANTICOAGULATION | Facility: CLINIC | Age: 63
End: 2018-06-25
Payer: COMMERCIAL

## 2018-06-25 DIAGNOSIS — Z79.01 LONG-TERM (CURRENT) USE OF ANTICOAGULANTS: ICD-10-CM

## 2018-06-25 LAB — INR POINT OF CARE: 2.2 (ref 0.86–1.14)

## 2018-06-25 PROCEDURE — 85610 PROTHROMBIN TIME: CPT | Mod: QW

## 2018-06-25 PROCEDURE — 36416 COLLJ CAPILLARY BLOOD SPEC: CPT

## 2018-06-25 PROCEDURE — 99207 ZZC NO CHARGE NURSE ONLY: CPT

## 2018-06-25 NOTE — MR AVS SNAPSHOT
Harley Alvarado   6/25/2018 8:30 AM   Anticoagulation Therapy Visit    Description:  62 year old male   Provider:  [facility]   Department:  [facility]           INR as of 6/25/2018     Today's INR 2.2      Anticoagulation Summary as of 6/25/2018     INR goal 2.0-3.0   Today's INR 2.2   Full warfarin instructions 5 mg on Sun; 7.5 mg all other days   Next INR check 9/20/2018    Indications   Long-term (current) use of anticoagulants [Z79.01] [Z79.01]  Pulmonary embolism (H) [I26.99]         Contact Numbers     [facility] Phone Numbers:  Anticoagulation Clinic Appointments : [phone]  Anticoagulation Nurse: [phone]         June 2018 Details    Sun Mon Tue Wed Thu Fri Sat          1               2                 3               4               5               6               7               8               9                 10               11               12               13               14               15               16                 17               18               19               20               21               22               23                 24               25      7.5 mg   See details      26      7.5 mg         27      7.5 mg         28      7.5 mg         29      7.5 mg         30      7.5 mg          Date Details   06/25 This INR check               How to take your warfarin dose     To take:  7.5 mg Take 1.5 of the 5 mg tablets.           July 2018 Details    Sun Mon Tue Wed Thu Fri Sat     1      5 mg         2      7.5 mg         3      7.5 mg         4      7.5 mg         5      7.5 mg         6      7.5 mg         7      7.5 mg           8      5 mg         9      7.5 mg         10      7.5 mg         11      7.5 mg         12      7.5 mg         13      7.5 mg         14      7.5 mg           15      5 mg         16      7.5 mg         17      7.5 mg         18      7.5 mg         19      7.5 mg         20      7.5 mg         21      7.5 mg            22      5 mg         23      7.5 mg         24      7.5 mg         25      7.5 mg         26      7.5 mg         27      7.5 mg         28      7.5 mg           29      5 mg         30      7.5 mg         31      7.5 mg              Date Details   No additional details            How to take your warfarin dose     To take:  5 mg Take 1 of the 5 mg tablets.    To take:  7.5 mg Take 1.5 of the 5 mg tablets.           August 2018 Details    Sun Mon Tue Wed Thu Fri Sat        1      7.5 mg         2      7.5 mg         3      7.5 mg         4      7.5 mg           5      5 mg         6      7.5 mg         7      7.5 mg         8      7.5 mg         9      7.5 mg         10      7.5 mg         11      7.5 mg           12      5 mg         13      7.5 mg         14      7.5 mg         15      7.5 mg         16      7.5 mg         17      7.5 mg         18      7.5 mg           19      5 mg         20      7.5 mg         21      7.5 mg         22      7.5 mg         23      7.5 mg         24      7.5 mg         25      7.5 mg           26      5 mg         27      7.5 mg         28      7.5 mg         29      7.5 mg         30      7.5 mg         31      7.5 mg           Date Details   No additional details            How to take your warfarin dose     To take:  5 mg Take 1 of the 5 mg tablets.    To take:  7.5 mg Take 1.5 of the 5 mg tablets.           September 2018 Details    Sun Mon Tue Wed Thu Fri Sat           1      7.5 mg           2      5 mg         3      7.5 mg         4      7.5 mg         5      7.5 mg         6      7.5 mg         7      7.5 mg         8      7.5 mg           9      5 mg         10      7.5 mg         11      7.5 mg         12      7.5 mg         13      7.5 mg         14      7.5 mg         15      7.5 mg           16      5 mg         17      7.5 mg         18      7.5 mg         19      7.5 mg         20            21               22                 23               24                25               26               27               28               29                 30                      Date Details   No additional details    Date of next INR:  9/20/2018         How to take your warfarin dose     To take:  5 mg Take 1 of the 5 mg tablets.    To take:  7.5 mg Take 1.5 of the 5 mg tablets.

## 2018-06-25 NOTE — PROGRESS NOTES
ANTICOAGULATION FOLLOW-UP CLINIC VISIT    Patient Name:  Harley Alvarado  Date:  6/25/2018  Contact Type:  Face to Face    SUBJECTIVE:     Patient Findings     Positives No Problem Findings    Comments Pt reports leaving for [place] 6/26/18 and will return 9/19/18, pt will schedule INR appt when he returns.            OBJECTIVE    INR Protime   Date Value Ref Range Status   06/25/2018 2.2 (A) 0.86 - 1.14 Final       ASSESSMENT / PLAN  INR assessment THER    Recheck INR In: 12 WEEKS    INR Location Clinic      Anticoagulation Summary as of 6/25/2018     INR goal 2.0-3.0   Today's INR 2.2   Warfarin maintenance plan 5 mg (5 mg x 1) on Sun; 7.5 mg (5 mg x 1.5) all other days   Full warfarin instructions 5 mg on Sun; 7.5 mg all other days   Weekly warfarin total 50 mg   No change documented Chelsea Madrid RN   Plan last modified Azul Garber RN (10/10/2017)   Next INR check 9/20/2018   Target end date Indefinite    Indications   Long-term (current) use of anticoagulants [Z79.01] [Z79.01]  Pulmonary embolism (H) [I26.99]         Anticoagulation Episode Summary     INR check location     Preferred lab     Send INR reminders to [facility]    Comments       Anticoagulation Care Providers     Provider Role Specialty Phone number    Cory Acosta PA-C Responsible Physician Assistant [phone]            See the Encounter Report to view Anticoagulation Flowsheet and Dosing Calendar (Go to Encounters tab in chart review, and find the Anticoagulation Therapy Visit)    Dosage adjustment made based on physician directed care plan.    Chelsea Madrid, RN

## 2018-08-06 ENCOUNTER — TELEPHONE (OUTPATIENT)
Dept: FAMILY MEDICINE | Facility: CLINIC | Age: 63
End: 2018-08-06

## 2018-08-06 NOTE — LETTER
[address] [address] [phone]  August 21, 2018    Harley Alvarado  [address] [facility] [address]    Dear Harley,    I care about your health and have reviewed your health plan. I have reviewed your medical conditions, medication list, and lab results and am making recommendations based on this review, to better manage your health.    You are in particular need of attention regarding:  -Colon Cancer Screening    I am recommending that you:  -schedule a COLONOSCOPY to look for colon cancer (due every 10 years or 5 years in higher risk situations.)   Colon cancer is now the second leading cause of death in the [place] for both men and women and there are over 130,000 new cases and 50,000 deaths per year from colon cancer.  Colonoscopies can prevent 90-95% of these deaths.  Problem lesions can be removed before they ever become cancer.  This test is not only looking for cancer, but also getting rid of precancerious lesions.  If you do not wish to do a colonoscopy or cannot afford to do one, at this time, there is another option. It is called a FIT test or Fecal Immunochemical Occult Blood Test (take home stool sample kit).  It does not replace the colonoscopy for colorectal cancer screening, but it can detect hidden bleeding in the lower colon.  It does need to be repeated every year and if a positive result is obtained, you would be referred for a colonoscopy.  If you have completed either one of these tests at another facility, please have the records sent to our clinic so that we can best coordinate your care.      Please call us at [phone] (or use [employer]) to address the above recommendations.     Thank you for trusting [facility] and we appreciate the opportunity to serve you.  We look forward to supporting your healthcare needs in the future.    Healthy Regards,    Cory Acosta PA-C

## 2018-08-06 NOTE — TELEPHONE ENCOUNTER
Panel Management Review      Patient has the following on his problem list: None      Composite cancer screening  Chart review shows that this patient is due/due soon for the following Colonoscopy  Summary:    Patient is due/failing the following:   COLONOSCOPY    Action needed:   Patient needs referral/order: colonoscopy or FIT test    Type of outreach:    routed to panel pool    Questions for provider review:    None                                                                                                                                    MORIAH Corea       Chart routed to Care Team .

## 2018-08-14 NOTE — TELEPHONE ENCOUNTER
Type of outreach:  Phone, left message for patient to call back.    Libia Espinosa MA on 8/14/2018 at 1:45 PM

## 2018-09-19 ENCOUNTER — ANTICOAGULATION THERAPY VISIT (OUTPATIENT)
Dept: NURSING | Facility: CLINIC | Age: 63
End: 2018-09-19
Payer: COMMERCIAL

## 2018-09-19 DIAGNOSIS — Z79.01 LONG-TERM (CURRENT) USE OF ANTICOAGULANTS: ICD-10-CM

## 2018-09-19 DIAGNOSIS — I26.99 PULMONARY EMBOLISM (H): ICD-10-CM

## 2018-09-19 LAB — INR POINT OF CARE: 1.5 (ref 0.86–1.14)

## 2018-09-19 PROCEDURE — 36416 COLLJ CAPILLARY BLOOD SPEC: CPT

## 2018-09-19 PROCEDURE — 85610 PROTHROMBIN TIME: CPT | Mod: QW

## 2018-09-19 PROCEDURE — 99207 ZZC NO CHARGE NURSE ONLY: CPT

## 2018-09-19 NOTE — TELEPHONE ENCOUNTER
"Requested Prescriptions   Pending Prescriptions Disp Refills     warfarin (COUMADIN) 5 MG tablet [Pharmacy Med Name: WARFARIN SOD 5MG TABLETS (PEACH)]    Last Written Prescription Date:  3/13/18  Last Fill Quantity: 120 tablet,  # refills: 1   Last office visit: 5/30/2018 with prescribing provider:  Ramy   Future Office Visit:     120 tablet 0     Sig: TAKE 1 TABLET BY MOUTH EVERY SUNDAY AND 1 AND 1/2 TABLETS BY MOUTH ALL OTHER DAYS OR AS DIRECTED BY INR CLINIC    Vitamin K Antagonists Failed    9/19/2018 11:15 AM       Failed - INR is within goal in the past 6 weeks    Confirm INR is within goal in the past 6 weeks.     Recent Labs   Lab Test 09/19/18   INR  1.5*                      Passed - Recent (12 mo) or future (30 days) visit within the authorizing provider's specialty    Patient had office visit in the last 12 months or has a visit in the next 30 days with authorizing provider or within the authorizing provider's specialty.  See \"Patient Info\" tab in inbasket, or \"Choose Columns\" in Meds & Orders section of the refill encounter.           Passed - Patient is 18 years of age or older          "

## 2018-09-19 NOTE — MR AVS SNAPSHOT
Harley Alvarado   9/19/2018 10:30 AM   Anticoagulation Therapy Visit    Description:  62 year old male   Provider:  [facility]   Department:  [facility] Nurse           INR as of 9/19/2018     Today's INR 1.5!      Anticoagulation Summary as of 9/19/2018     INR goal 2.0-3.0   Today's INR 1.5!   Full warfarin instructions 5 mg on Sun; 7.5 mg all other days   Next INR check 9/27/2018    Indications   Long-term (current) use of anticoagulants [Z79.01] [Z79.01]  Pulmonary embolism (H) [I26.99]         Your next Anticoagulation Clinic appointment(s)     Sep 27, 2018  3:15 PM CDT   Anticoagulation Visit with  ANTICOAGULATION CLINIC   [facility]    [address] [place] [address] [phone]              Contact Numbers     [facility]  Please call [phone] with any problems or questions regarding your therapy, or to cancel or reschedule your appointment.          September 2018 Details    Sun Mon Tue Wed Thu Fri Sat           1                 2               3               4               5               6               7               8                 9               10               11               12               13               14               15                 16               17               18               19      7.5 mg   See details      20      7.5 mg         21      7.5 mg         22      7.5 mg           23      5 mg         24      7.5 mg         25      7.5 mg         26      7.5 mg         27            28               29                 30                      Date Details   09/19 This INR check       Date of next INR:  9/27/2018         How to take your warfarin dose     To take:  5 mg Take 1 of the 5 mg tablets.    To take:  7.5 mg Take 1.5 of the 5 mg tablets.

## 2018-09-19 NOTE — PROGRESS NOTES
"  ANTICOAGULATION FOLLOW-UP CLINIC VISIT    Patient Name:  Harley Alvarado  Date:  9/19/2018  Contact Type:  Face to Face    SUBJECTIVE:     Patient Findings     Comments Pt was in [place] for almost 3 months.  He miscalculated how much warfarin to bring with him so he saw a doctor in [place] who prescribed and dose him with an \"equivalent\" to warfarin.  He just returned from [place] yesterday and will  his warfarin.  He also had a change in his diet as he was tenting.    He took Acenocoumarol 1mg tablet alternating taking 3 tablets one day and then 2 tablets.  Pt will  his warfarin and take for a full week with a f/u on Thursday in [facility].  Pt denied any signs of a clot           OBJECTIVE    INR Protime   Date Value Ref Range Status   09/19/2018 1.5 (A) 0.86 - 1.14 Final       ASSESSMENT / PLAN  No question data found.  Anticoagulation Summary as of 9/19/2018     INR goal 2.0-3.0   Today's INR 1.5!   Warfarin maintenance plan 5 mg (5 mg x 1) on Sun; 7.5 mg (5 mg x 1.5) all other days   Full warfarin instructions 5 mg on Sun; 7.5 mg all other days   Weekly warfarin total 50 mg   No change documented Deanna Sharma RN   Plan last modified Azul Garber RN (10/10/2017)   Next INR check 9/27/2018   Target end date Indefinite    Indications   Long-term (current) use of anticoagulants [Z79.01] [Z79.01]  Pulmonary embolism (H) [I26.99]         Anticoagulation Episode Summary     INR check location     Preferred lab     Send INR reminders to [facility]    Comments       Anticoagulation Care Providers     Provider Role Specialty Phone number    Cory Acosta PA-C Responsible Physician Assistant [phone]            See the Encounter Report to view Anticoagulation Flowsheet and Dosing Calendar (Go to Encounters tab in chart review, and find the Anticoagulation Therapy Visit)    Dosage adjustment made based on physician directed care plan.    Deanna Sharma RN               "

## 2018-09-20 RX ORDER — WARFARIN SODIUM 5 MG/1
TABLET ORAL
Qty: 120 TABLET | Refills: 1 | Status: SHIPPED | OUTPATIENT
Start: 2018-09-20 | End: 2019-02-12

## 2018-09-20 NOTE — TELEPHONE ENCOUNTER
Last INR: 09/19/18=1.5 at [facility]  Next INR: 09/27/18    Prescription approved per [facility] Refill Protocol.    Azul Garber RN

## 2018-09-21 NOTE — PATIENT INSTRUCTIONS
09/21/18--Patient called [facility] INR clinic stating he didn't feel comfortable NOT increasing his Warfarin dose after the 1.5 INR reading on 09/19/18 at .  Patient states he would feel more comfortable increasing his Warfarin dose today and tomorrow (09/21,22) to 10mg each day and checking INR on 09/27/18.  Patient does eat ample amounts of dark greens so I informed patient that increasing his Warfarin x 2 days is acceptable.    Azul Garber RN

## 2018-10-02 ENCOUNTER — ANTICOAGULATION THERAPY VISIT (OUTPATIENT)
Dept: NURSING | Facility: CLINIC | Age: 63
End: 2018-10-02
Payer: COMMERCIAL

## 2018-10-02 DIAGNOSIS — I26.99 PULMONARY EMBOLISM (H): ICD-10-CM

## 2018-10-02 LAB — INR POINT OF CARE: 3.8 (ref 0.86–1.14)

## 2018-10-02 PROCEDURE — 36416 COLLJ CAPILLARY BLOOD SPEC: CPT

## 2018-10-02 PROCEDURE — 85610 PROTHROMBIN TIME: CPT | Mod: QW

## 2018-10-02 NOTE — PROGRESS NOTES
ANTICOAGULATION FOLLOW-UP CLINIC VISIT    Patient Name:  Harley Alvarado  Date:  10/2/2018  Contact Type:  Face to Face    SUBJECTIVE:     Patient Findings     Positives Change in diet/appetite (Less greens, he was hesitant about eating greens due to recent sub-therapeutic INR.  I encouraged consistency with greens)           OBJECTIVE    INR Protime   Date Value Ref Range Status   10/02/2018 3.8 (A) 0.86 - 1.14 Final       ASSESSMENT / PLAN  INR assessment SUPRA    Recheck INR In: 2 WEEKS    INR Location Clinic      Anticoagulation Summary as of 10/2/2018     INR goal 2.0-3.0   Today's INR 3.8!   Warfarin maintenance plan 5 mg (5 mg x 1) on Sun; 7.5 mg (5 mg x 1.5) all other days   Full warfarin instructions 10/2: 2.5 mg; Otherwise 5 mg on Sun; 7.5 mg all other days   Weekly warfarin total 50 mg   Plan last modified Azul Garber RN (10/10/2017)   Next INR check 10/16/2018   Target end date Indefinite    Indications   Long-term (current) use of anticoagulants [Z79.01] [Z79.01]  Pulmonary embolism (H) [I26.99]         Anticoagulation Episode Summary     INR check location     Preferred lab     Send INR reminders to [facility]    Comments       Anticoagulation Care Providers     Provider Role Specialty Phone number    Cory Acosta PA-C Responsible Physician Assistant [phone]            See the Encounter Report to view Anticoagulation Flowsheet and Dosing Calendar (Go to Encounters tab in chart review, and find the Anticoagulation Therapy Visit)        Azul Garber RN

## 2018-10-02 NOTE — MR AVS SNAPSHOT
Harley Alvarado   10/2/2018 3:00 PM   Anticoagulation Therapy Visit    Description:  62 year old male   Provider:  [facility]   Department:  [facility] Nurse           INR as of 10/2/2018     Today's INR 3.8!      Anticoagulation Summary as of 10/2/2018     INR goal 2.0-3.0   Today's INR 3.8!   Full warfarin instructions 10/2: 2.5 mg; Otherwise 5 mg on Sun; 7.5 mg all other days   Next INR check 10/16/2018    Indications   Long-term (current) use of anticoagulants [Z79.01] [Z79.01]  Pulmonary embolism (H) [I26.99]         Your next Anticoagulation Clinic appointment(s)     Oct 16, 2018 11:15 AM CDT   Anticoagulation Visit with [facility]   [facility]    [address] [place] [address]   [phone]              Contact Numbers     Clinic Number:         October 2018 Details    Sun Mon Tue Wed Thu Fri Sat      1               2      2.5 mg   See details      3      7.5 mg         4      7.5 mg         5      7.5 mg         6      7.5 mg           7      5 mg         8      7.5 mg         9      7.5 mg         10      7.5 mg         11      7.5 mg         12      7.5 mg         13      7.5 mg           14      5 mg         15      7.5 mg         16            17               18               19               20                 21               22               23               24               25               26               27                 28               29               30               31                   Date Details   10/02 This INR check   Take 2.5 mg (5 mg tablets x 0.5)   Eat greens       Date of next INR:  10/16/2018         How to take your warfarin dose     To take:  2.5 mg Take 0.5 of a 5 mg tablet.    To take:  5 mg Take 1 of the 5 mg tablets.    To take:  7.5 mg Take 1.5 of the 5 mg tablets.

## 2018-10-05 ENCOUNTER — TELEPHONE (OUTPATIENT)
Dept: NURSING | Facility: CLINIC | Age: 63
End: 2018-10-05

## 2018-10-05 DIAGNOSIS — I26.99 OTHER PULMONARY EMBOLISM WITHOUT ACUTE COR PULMONALE, UNSPECIFIED CHRONICITY (H): Primary | ICD-10-CM

## 2018-10-05 NOTE — TELEPHONE ENCOUNTER
Has the patient previously taken warfarin? yes  If yes, for what indication? Pulmonary Embolism (2011)    Does the patient have any of the following indications for a higher range of 2.5-3.5:    Mitral position mechanical valve? no    Michelle-Shiley, Ball and Cage or Monoleaflet valve (regardless of position) no    Other (if yes, please explain) no      New insurance and medicare reimbursement rules require that all of our INR patients have an INR Clinic referral order in Epic, and that it be renewed annually.     We have entered this initial order for you and your signature is required once you review it.       You may close this encounter once signed.     Thank you,     Azul Garber RN

## 2018-10-16 ENCOUNTER — ANTICOAGULATION THERAPY VISIT (OUTPATIENT)
Dept: NURSING | Facility: CLINIC | Age: 63
End: 2018-10-16
Payer: COMMERCIAL

## 2018-10-16 DIAGNOSIS — I26.99 OTHER PULMONARY EMBOLISM WITHOUT ACUTE COR PULMONALE, UNSPECIFIED CHRONICITY (H): ICD-10-CM

## 2018-10-16 LAB — INR POINT OF CARE: 3.9 (ref 0.86–1.14)

## 2018-10-16 PROCEDURE — 36416 COLLJ CAPILLARY BLOOD SPEC: CPT

## 2018-10-16 PROCEDURE — 85610 PROTHROMBIN TIME: CPT | Mod: QW

## 2018-10-16 NOTE — MR AVS SNAPSHOT
Harley Alvarado   10/16/2018 11:15 AM   Anticoagulation Therapy Visit    Description:  62 year old male   Provider:  [facility]   Department:  [facility] Nurse           INR as of 10/16/2018     Today's INR 3.9!      Anticoagulation Summary as of 10/16/2018     INR goal 2.0-3.0   Today's INR 3.9!   Full warfarin instructions 10/16: Hold; Otherwise 5 mg on Sun; 7.5 mg all other days   Next INR check 10/19/2018    Indications   Long-term (current) use of anticoagulants [Z79.01] [Z79.01]  Pulmonary embolism (H) [I26.99]         Your next Anticoagulation Clinic appointment(s)     Oct 19, 2018 10:45 AM CDT   Anticoagulation Visit with [facility]   [facility]    [address] [place] [address]   [phone]              Contact Numbers     Clinic Number:         October 2018 Details    Sun Mon Tue Wed Thu Fri Sat      1               2               3               4               5               6                 7               8               9               10               11               12               13                 14               15               16      Hold   See details      17      7.5 mg         18      7.5 mg         19            20                 21               22               23               24               25               26               27                 28               29               30               31                   Date Details   10/16 This INR check       Date of next INR:  10/19/2018         How to take your warfarin dose     To take:  7.5 mg Take 1.5 of the 5 mg tablets.    Hold Do not take your warfarin dose. See the Details table to the right for additional instructions.

## 2018-10-16 NOTE — PROGRESS NOTES
ANTICOAGULATION FOLLOW-UP CLINIC VISIT    Patient Name:  Harley Alvarado  Date:  10/16/2018  Contact Type:  Face to Face    SUBJECTIVE:     Patient Findings     Positives Unexplained INR or factor level change    Comments 2nd supra INR, patient will hold Warfarin today and recheck INR in 3 days.           OBJECTIVE    INR Protime   Date Value Ref Range Status   10/16/2018 3.9 (A) 0.86 - 1.14 Final       ASSESSMENT / PLAN  INR assessment SUPRA    Recheck INR In: 3 DAYS    INR Location Clinic      Anticoagulation Summary as of 10/16/2018     INR goal 2.0-3.0   Today's INR 3.9!   Warfarin maintenance plan 5 mg (5 mg x 1) on Sun; 7.5 mg (5 mg x 1.5) all other days   Full warfarin instructions 10/16: Hold; Otherwise 5 mg on Sun; 7.5 mg all other days   Weekly warfarin total 50 mg   Plan last modified Azul Garber RN (10/10/2017)   Next INR check 10/19/2018   Target end date Indefinite    Indications   Long-term (current) use of anticoagulants [Z79.01] [Z79.01]  Pulmonary embolism (H) [I26.99]         Anticoagulation Episode Summary     INR check location     Preferred lab     Send INR reminders to [facility]    Comments       Anticoagulation Care Providers     Provider Role Specialty Phone number    Cory Acosta PA-C Responsible Physician Assistant [phone]            See the Encounter Report to view Anticoagulation Flowsheet and Dosing Calendar (Go to Encounters tab in chart review, and find the Anticoagulation Therapy Visit)        Azul Garber, RN

## 2018-10-19 ENCOUNTER — ANTICOAGULATION THERAPY VISIT (OUTPATIENT)
Dept: NURSING | Facility: CLINIC | Age: 63
End: 2018-10-19
Payer: COMMERCIAL

## 2018-10-19 DIAGNOSIS — I26.99 OTHER PULMONARY EMBOLISM WITHOUT ACUTE COR PULMONALE, UNSPECIFIED CHRONICITY (H): ICD-10-CM

## 2018-10-19 LAB — INR POINT OF CARE: 2.5 (ref 0.86–1.14)

## 2018-10-19 PROCEDURE — 85610 PROTHROMBIN TIME: CPT | Mod: QW

## 2018-10-19 PROCEDURE — 36416 COLLJ CAPILLARY BLOOD SPEC: CPT

## 2018-10-19 PROCEDURE — 99207 ZZC NO CHARGE NURSE ONLY: CPT

## 2018-10-19 NOTE — PROGRESS NOTES
ANTICOAGULATION FOLLOW-UP CLINIC VISIT    Patient Name:  Harley Alvarado  Date:  10/19/2018  Contact Type:  Face to Face    SUBJECTIVE:     Patient Findings     Positives Intentional hold of therapy (Held on 10/16 due to supra INR)    Comments Patient not interested at all in changing his Warfarin maintenance dose, he will continue with eating greens every other day and retest INR in 2 weeks.           OBJECTIVE    INR Protime   Date Value Ref Range Status   10/19/2018 2.5 (A) 0.86 - 1.14 Final       ASSESSMENT / PLAN  INR assessment THER    Recheck INR In: 2 WEEKS    INR Location Clinic      Anticoagulation Summary as of 10/19/2018     INR goal 2.0-3.0   Today's INR 2.5   Warfarin maintenance plan 5 mg (5 mg x 1) on Sun; 7.5 mg (5 mg x 1.5) all other days   Full warfarin instructions 5 mg on Sun; 7.5 mg all other days   Weekly warfarin total 50 mg   No change documented Azul Garber RN   Plan last modified Azul Garber RN (10/10/2017)   Next INR check 11/2/2018   Target end date Indefinite    Indications   Long-term (current) use of anticoagulants [Z79.01] [Z79.01]  Pulmonary embolism (H) [I26.99]         Anticoagulation Episode Summary     INR check location     Preferred lab     Send INR reminders to [facility]    Comments       Anticoagulation Care Providers     Provider Role Specialty Phone number    Cory Acosta PA-C Responsible Physician Assistant [phone]            See the Encounter Report to view Anticoagulation Flowsheet and Dosing Calendar (Go to Encounters tab in chart review, and find the Anticoagulation Therapy Visit)        Azul Garber RN

## 2018-10-19 NOTE — MR AVS SNAPSHOT
Harley Alvarado   10/19/2018 10:45 AM   Anticoagulation Therapy Visit    Description:  62 year old male   Provider:  [facility]   Department:  Cr Nurse           INR as of 10/19/2018     Today's INR 2.5      Anticoagulation Summary as of 10/19/2018     INR goal 2.0-3.0   Today's INR 2.5   Full warfarin instructions 5 mg on Sun; 7.5 mg all other days   Next INR check 11/2/2018    Indications   Long-term (current) use of anticoagulants [Z79.01] [Z79.01]  Pulmonary embolism (H) [I26.99]         Your next Anticoagulation Clinic appointment(s)     Nov 02, 2018 11:15 AM CDT   [facility]tico[facility] Visit with [facility]   [facility]    [address] [place] [id number] [phone]              Contact Numbers     Clinic Number:         October 2018 Details    Sun Mon Tue Wed Thu Fri Sat      1               2               3               4               5               6                 7               8               9               10               11               12               13                 14               15               16               17               18               19      7.5 mg   See details      20      7.5 mg           21      5 mg         22      7.5 mg         23      7.5 mg         24      7.5 mg         25      7.5 mg         26      7.5 mg         27      7.5 mg           28      5 mg         29      7.5 mg         30      7.5 mg         31      7.5 mg             Date Details   10/19 This INR check               How to take your warfarin dose     To take:  5 mg Take 1 of the 5 mg tablets.    To take:  7.5 mg Take 1.5 of the 5 mg tablets.           November 2018 Details    Sun Mon Tue Wed Thu Fri Sat         1      7.5 mg         2            3                 4               5               6               7               8               9               10                 11                12               13               14               15               16               17                 18               19               20               21               22               23               24                 25               26               27               28               29               30                 Date Details   No additional details    Date of next INR:  11/2/2018         How to take your warfarin dose     To take:  7.5 mg Take 1.5 of the 5 mg tablets.

## 2018-10-29 ENCOUNTER — TELEPHONE (OUTPATIENT)
Dept: FAMILY MEDICINE | Facility: CLINIC | Age: 63
End: 2018-10-29

## 2018-10-29 NOTE — TELEPHONE ENCOUNTER
Panel Management Review      Patient has the following on his problem list: None      Composite cancer screening  Chart review shows that this patient is due/due soon for the following Colonoscopy  Summary:    Patient is due/failing the following:   COLONOSCOPY    Action needed:   Patient needs referral/order: colonoscopy    Type of outreach:    Routed to panel pool    Questions for provider review:    None                                                                                                                                    MORIAH Corea       Chart routed to Care Team .

## 2018-11-02 ENCOUNTER — ANTICOAGULATION THERAPY VISIT (OUTPATIENT)
Dept: NURSING | Facility: CLINIC | Age: 63
End: 2018-11-02
Payer: COMMERCIAL

## 2018-11-02 DIAGNOSIS — I26.99 OTHER PULMONARY EMBOLISM WITHOUT ACUTE COR PULMONALE, UNSPECIFIED CHRONICITY (H): ICD-10-CM

## 2018-11-02 LAB — INR POINT OF CARE: 3.5 (ref 0.86–1.14)

## 2018-11-02 PROCEDURE — 85610 PROTHROMBIN TIME: CPT | Mod: QW

## 2018-11-02 PROCEDURE — 36416 COLLJ CAPILLARY BLOOD SPEC: CPT

## 2018-11-02 PROCEDURE — 99207 ZZC NO CHARGE NURSE ONLY: CPT

## 2018-11-02 NOTE — MR AVS SNAPSHOT
Harley Alvarado   11/2/2018 11:15 AM   Anticoagulation Therapy Visit    Description:  62 year old male   Provider:  [facility]   Department:  Cr Nurse           INR as of 11/2/2018     Today's INR 3.5!      Anticoagulation Summary as of 11/2/2018     INR goal 2.0-3.0   Today's INR 3.5!   Full warfarin instructions 5 mg on Sun, Thu; 7.5 mg all other days   Next INR check 11/15/2018    Indications   Long-term (current) use of anticoagulants [Z79.01] [Z79.01]  Pulmonary embolism (H) [I26.99]         Your next Anticoagulation Clinic appointment(s)     Nov 15, 2018 12:15 PM CST   Anticoagulation Visit with [facility]   [facility]    [address] [place] [id number] [phone]              Contact Numbers     Clinic Number:         November 2018 Details    Sun Mon Tue Wed Thu Fri Sat         1               2      7.5 mg   See details      3      7.5 mg           4      5 mg         5      7.5 mg         6      7.5 mg         7      7.5 mg         8      5 mg         9      7.5 mg         10      7.5 mg           11      5 mg         12      7.5 mg         13      7.5 mg         14      7.5 mg         15            16               17                 18               19               20               21               22               23               24                 25               26               27               28               29               30                 Date Details   11/02 This INR check   Eat greens       Date of next INR:  11/15/2018         How to take your warfarin dose     To take:  5 mg Take 1 of the 5 mg tablets.    To take:  7.5 mg Take 1.5 of the 5 mg tablets.

## 2018-11-02 NOTE — PROGRESS NOTES
ANTICOAGULATION FOLLOW-UP CLINIC VISIT    Patient Name:  Harley Alvarado  Date:  11/2/2018  Contact Type:  Face to Face    SUBJECTIVE:     Patient Findings     Positives Unexplained INR or factor level change    Comments 3rd supra INR, patient was agreeable to decreasing his weekly Warfarin dose this time.           OBJECTIVE    INR Protime   Date Value Ref Range Status   11/02/2018 3.5 (A) 0.86 - 1.14 Final       ASSESSMENT / PLAN  INR assessment SUPRA    Recheck INR In: 2 WEEKS    INR Location Clinic      Anticoagulation Summary as of 11/2/2018     INR goal 2.0-3.0   Today's INR 3.5!   Warfarin maintenance plan 5 mg (5 mg x 1) on Sun, Thu; 7.5 mg (5 mg x 1.5) all other days   Full warfarin instructions 5 mg on Sun, Thu; 7.5 mg all other days   Weekly warfarin total 47.5 mg   Plan last modified Azul Garber RN (11/2/2018)   Next INR check 11/15/2018   Target end date Indefinite    Indications   Long-term (current) use of anticoagulants [Z79.01] [Z79.01]  Pulmonary embolism (H) [I26.99]         Anticoagulation Episode Summary     INR check location     Preferred lab     Send INR reminders to [facility]    Comments       Anticoagulation Care Providers     Provider Role Specialty Phone number    Cory Acosta PA-C Responsible Physician Assistant [phone]            See the Encounter Report to view Anticoagulation Flowsheet and Dosing Calendar (Go to Encounters tab in chart review, and find the Anticoagulation Therapy Visit)        Azul Garber RN

## 2018-11-20 ENCOUNTER — ANTICOAGULATION THERAPY VISIT (OUTPATIENT)
Dept: NURSING | Facility: CLINIC | Age: 63
End: 2018-11-20
Payer: COMMERCIAL

## 2018-11-20 DIAGNOSIS — I26.99 OTHER PULMONARY EMBOLISM WITHOUT ACUTE COR PULMONALE, UNSPECIFIED CHRONICITY (H): ICD-10-CM

## 2018-11-20 LAB — INR POINT OF CARE: 2.4 (ref 0.86–1.14)

## 2018-11-20 PROCEDURE — 99207 ZZC NO CHARGE NURSE ONLY: CPT

## 2018-11-20 PROCEDURE — 36416 COLLJ CAPILLARY BLOOD SPEC: CPT

## 2018-11-20 PROCEDURE — 85610 PROTHROMBIN TIME: CPT | Mod: QW

## 2018-11-20 NOTE — MR AVS SNAPSHOT
Harley Alvarado   11/20/2018 8:30 AM   Anticoagulation Therapy Visit    Description:  62 year old male   Provider:  [facility]   Department:  Cr Nurse           INR as of 11/20/2018     Today's INR 2.4      Anticoagulation Summary as of 11/20/2018     INR goal 2.0-3.0   Today's INR 2.4   Full warfarin instructions 5 mg on Sun, Thu; 7.5 mg all other days   Next INR check 12/17/2018    Indications   Long-term (current) use of anticoagulants [Z79.01] [Z79.01]  Pulmonary embolism (H) [I26.99]         Your next Anticoagulation Clinic appointment(s)     Dec 17, 2018 10:00 AM CST   Anticoagulation Visit with [facility]   [facility]    [address] [place] [id number] [phone]              Contact Numbers     Clinic Number:         November 2018 Details    Sun Mon Tue Wed Thu Fri Sat         1               2               3                 4               5               6               7               8               9               10                 11               12               13               14               15               16               17                 18               19               20      7.5 mg   See details      21      7.5 mg         22      5 mg         23      7.5 mg         24      7.5 mg           25      5 mg         26      7.5 mg         27      7.5 mg         28      7.5 mg         29      5 mg         30      7.5 mg           Date Details   11/20 This INR check               How to take your warfarin dose     To take:  5 mg Take 1 of the 5 mg tablets.    To take:  7.5 mg Take 1.5 of the 5 mg tablets.           December 2018 Details    Sun Mon Tue Wed Thu Fri Sat           1      7.5 mg           2      5 mg         3      7.5 mg         4      7.5 mg         5      7.5 mg         6      5 mg         7      7.5 mg         8      7.5 mg           9      5 mg         10      7.5  mg         11      7.5 mg         12      7.5 mg         13      5 mg         14      7.5 mg         15      7.5 mg           16      5 mg         17            18               19               20               21               22                 23               24               25               26               27               28               29                 30               31                     Date Details   No additional details    Date of next INR:  12/17/2018         How to take your warfarin dose     To take:  5 mg Take 1 of the 5 mg tablets.    To take:  7.5 mg Take 1.5 of the 5 mg tablets.

## 2018-11-20 NOTE — PROGRESS NOTES
ANTICOAGULATION FOLLOW-UP CLINIC VISIT    Patient Name:  Harley Alvarado  Date:  11/20/2018  Contact Type:  Face to Face    SUBJECTIVE:     Patient Findings     Positives No Problem Findings           OBJECTIVE    INR Protime   Date Value Ref Range Status   11/20/2018 2.4 (A) 0.86 - 1.14 Final       ASSESSMENT / PLAN  INR assessment THER    Recheck INR In: 4 WEEKS    INR Location Clinic      Anticoagulation Summary as of 11/20/2018     INR goal 2.0-3.0   Today's INR 2.4   Warfarin maintenance plan 5 mg (5 mg x 1) on Sun, Thu; 7.5 mg (5 mg x 1.5) all other days   Full warfarin instructions 5 mg on Sun, Thu; 7.5 mg all other days   Weekly warfarin total 47.5 mg   No change documented Azul Garber RN   Plan last modified Azul Garber RN (11/2/2018)   Next INR check 12/17/2018   Target end date Indefinite    Indications   Long-term (current) use of anticoagulants [Z79.01] [Z79.01]  Pulmonary embolism (H) [I26.99]         Anticoagulation Episode Summary     INR check location     Preferred lab     Send INR reminders to [facility]    Comments       Anticoagulation Care Providers     Provider Role Specialty Phone number    Cory Acosta PA-C Responsible Physician Assistant [phone]            See the Encounter Report to view Anticoagulation Flowsheet and Dosing Calendar (Go to Encounters tab in chart review, and find the Anticoagulation Therapy Visit)        Azul Garber RN

## 2018-12-17 ENCOUNTER — ANTICOAGULATION THERAPY VISIT (OUTPATIENT)
Dept: NURSING | Facility: CLINIC | Age: 63
End: 2018-12-17
Payer: COMMERCIAL

## 2018-12-17 DIAGNOSIS — I26.99 OTHER PULMONARY EMBOLISM WITHOUT ACUTE COR PULMONALE, UNSPECIFIED CHRONICITY (H): ICD-10-CM

## 2018-12-17 LAB — INR POINT OF CARE: 2.5 (ref 0.86–1.14)

## 2018-12-17 PROCEDURE — 36416 COLLJ CAPILLARY BLOOD SPEC: CPT

## 2018-12-17 PROCEDURE — 85610 PROTHROMBIN TIME: CPT | Mod: QW

## 2018-12-17 PROCEDURE — 99207 ZZC NO CHARGE NURSE ONLY: CPT

## 2018-12-17 NOTE — PROGRESS NOTES
ANTICOAGULATION FOLLOW-UP CLINIC VISIT    Patient Name:  Harley Alvarado  Date:  2018  Contact Type:  Face to Face    SUBJECTIVE:     Patient Findings     Positives:   No Problem Findings           OBJECTIVE    INR Protime   Date Value Ref Range Status   2018 2.5 (A) 0.86 - 1.14 Final       ASSESSMENT / PLAN  INR assessment THER    Recheck INR In: 4 WEEKS    INR Location Clinic      Anticoagulation Summary  As of 2018    INR goal:   2.0-3.0   TTR:   68.1 % (1.2 y)   INR used for dosin.5 (2018)   Warfarin maintenance plan:   5 mg (5 mg x 1) every Sun, Thu; 7.5 mg (5 mg x 1.5) all other days   Full warfarin instructions:   5 mg every Sun, Thu; 7.5 mg all other days   Weekly warfarin total:   47.5 mg   No change documented:   Azul Garber RN   Plan last modified:   Azul Garber RN (2018)   Next INR check:   2019   Target end date:   Indefinite    Indications    Long-term (current) use of anticoagulants [Z79.01] [Z79.01]  Pulmonary embolism (H) [I26.99]             Anticoagulation Episode Summary     INR check location:       Preferred lab:       Send INR reminders to:   [facility]    Comments:         Anticoagulation Care Providers     Provider Role Specialty Phone number    Cory Acosta PA-C Responsible Physician Assistant [phone]            See the Encounter Report to view Anticoagulation Flowsheet and Dosing Calendar (Go to Encounters tab in chart review, and find the Anticoagulation Therapy Visit)        Azul Garber RN

## 2019-01-12 ENCOUNTER — ANCILLARY PROCEDURE (OUTPATIENT)
Dept: GENERAL RADIOLOGY | Facility: CLINIC | Age: 64
End: 2019-01-12
Payer: COMMERCIAL

## 2019-01-12 ENCOUNTER — OFFICE VISIT (OUTPATIENT)
Dept: URGENT CARE | Facility: URGENT CARE | Age: 64
End: 2019-01-12
Payer: COMMERCIAL

## 2019-01-12 VITALS
TEMPERATURE: 97.6 F | WEIGHT: 154.2 LBS | SYSTOLIC BLOOD PRESSURE: 102 MMHG | OXYGEN SATURATION: 98 % | DIASTOLIC BLOOD PRESSURE: 76 MMHG | BODY MASS INDEX: 24.89 KG/M2 | HEART RATE: 70 BPM

## 2019-01-12 DIAGNOSIS — M79.671 RIGHT FOOT PAIN: ICD-10-CM

## 2019-01-12 DIAGNOSIS — M79.671 RIGHT FOOT PAIN: Primary | ICD-10-CM

## 2019-01-12 PROCEDURE — 99214 OFFICE O/P EST MOD 30 MIN: CPT | Performed by: FAMILY MEDICINE

## 2019-01-12 PROCEDURE — 73630 X-RAY EXAM OF FOOT: CPT | Mod: RT

## 2019-01-12 NOTE — PROGRESS NOTES
SUBJECTIVE:  Harley Alvarado is a 63 year old male who sustained a right foot injury several days ago. Mechanism of injury: mmay have slipped on the ice'. Immediate symptoms: delayed pain, delayed swelling. Symptoms have been sudden since that time. Prior history of related problems: no prior problems with this area in the past.    He is not exactly specific on how bad is his pain.    OBJECTIVE:  Vital signs as noted above.  Appearance: in no apparent distress.  Foot/ankle exam: soft tissue swelling and tenderness over the base of 5th metatarsal.  He is walking with a limp secondary to the pain.  X-ray: no fracture or dislocation noted.    ASSESSMENT:  foot strain, sprain and contusion  Kidney disease; because of this he cannot take a nonsteroidal medication    PLAN:  Tylenol 1000 mg per day  Ice area  Rest  area  Consider boot or a stiff shoe .  This should help with the decrease of pressure in the area    I did go over the x-ray with him and tell him that it was negative and there is no evidence of fractures.    Did discuss that he has kidney disease he cannot use a nonsteroidal medication.  I did suggest 2000 mg he was uncomfortable this but he will use 1000 mg of Tylenol over this next week.    Tells me he will attempt to ice it, and he will give it some rest and not walking on it is much as the previously did.    In addition I did discuss with him if he continues to have pain for greater than 1 week's time if he is indeed icing the area taking his Tylenol and trying to rest it that he may need to be seen again.    We discussed that in times that there are those who have a fracture that is not seen the first time it x-rayed and he may need it x-rayed again.

## 2019-01-14 ENCOUNTER — ANTICOAGULATION THERAPY VISIT (OUTPATIENT)
Dept: NURSING | Facility: CLINIC | Age: 64
End: 2019-01-14
Payer: COMMERCIAL

## 2019-01-14 DIAGNOSIS — I26.99 OTHER PULMONARY EMBOLISM WITHOUT ACUTE COR PULMONALE, UNSPECIFIED CHRONICITY (H): ICD-10-CM

## 2019-01-14 LAB — INR POINT OF CARE: 3.1 (ref 0.86–1.14)

## 2019-01-14 PROCEDURE — 36416 COLLJ CAPILLARY BLOOD SPEC: CPT

## 2019-01-14 PROCEDURE — 85610 PROTHROMBIN TIME: CPT | Mod: QW

## 2019-01-14 PROCEDURE — 99207 ZZC NO CHARGE NURSE ONLY: CPT

## 2019-01-14 NOTE — PROGRESS NOTES
ANTICOAGULATION FOLLOW-UP CLINIC VISIT    Patient Name:  Harley Alvarado  Date:  1/14/2019  Contact Type:  Face to Face    SUBJECTIVE:     Patient Findings     Positives:   No Problem Findings, Unexplained INR or factor level change           OBJECTIVE    INR Protime   Date Value Ref Range Status   01/14/2019 3.1 (A) 0.86 - 1.14 Final       ASSESSMENT / PLAN  INR assessment THER    Recheck INR In: 4 WEEKS    INR Location Clinic      Anticoagulation Summary  As of 1/14/2019    INR goal:   2.0-3.0   TTR:   69.1 % (1.2 y)   INR used for dosing:   3.1! (1/14/2019)   Warfarin maintenance plan:   5 mg (5 mg x 1) every Sun, Thu; 7.5 mg (5 mg x 1.5) all other days   Full warfarin instructions:   5 mg every Sun, Thu; 7.5 mg all other days   Weekly warfarin total:   47.5 mg   No change documented:   Azul Garber RN   Plan last modified:   Azul Garber RN (11/2/2018)   Next INR check:   2/12/2019   Target end date:   Indefinite    Indications    Long-term (current) use of anticoagulants [Z79.01] [Z79.01]  Pulmonary embolism (H) [I26.99]             Anticoagulation Episode Summary     INR check location:       Preferred lab:       Send INR reminders to:   [facility]    Comments:         Anticoagulation Care Providers     Provider Role Specialty Phone number    Cory Acsota PA-C Responsible Physician Assistant [phone]            See the Encounter Report to view Anticoagulation Flowsheet and Dosing Calendar (Go to Encounters tab in chart review, and find the Anticoagulation Therapy Visit)        Azul Garber RN

## 2019-01-28 ENCOUNTER — TELEPHONE (OUTPATIENT)
Dept: FAMILY MEDICINE | Facility: CLINIC | Age: 64
End: 2019-01-28

## 2019-01-28 NOTE — TELEPHONE ENCOUNTER
Type of outreach:  Phone, spoke to patient.  Pt wants to clear a colonoscopy with his Nephrologist.  He will let us know.  Shari Schoenberger, [facility]

## 2019-02-07 ENCOUNTER — TELEPHONE (OUTPATIENT)
Dept: FAMILY MEDICINE | Facility: CLINIC | Age: 64
End: 2019-02-07

## 2019-02-07 NOTE — TELEPHONE ENCOUNTER
Patient called INR clinic to inform me that he has been drinking a green smoothie everyday x 1 week that contains spinach and kale.  Patient inquired if he could come in before 02/12 for his INR check.   I offered patient 02/08 or 02/11 but he declined stating he will just keep his appointment for 02/12.   Patient verbalized understanding that his INR may be sub-therapeutic due to the smoothies.    Azul Garber RN

## 2019-02-12 ENCOUNTER — ANTICOAGULATION THERAPY VISIT (OUTPATIENT)
Dept: NURSING | Facility: CLINIC | Age: 64
End: 2019-02-12
Payer: COMMERCIAL

## 2019-02-12 DIAGNOSIS — Z79.01 LONG TERM CURRENT USE OF ANTICOAGULANTS WITH INR GOAL OF 2.0-3.0: ICD-10-CM

## 2019-02-12 DIAGNOSIS — I26.99 OTHER PULMONARY EMBOLISM WITHOUT ACUTE COR PULMONALE, UNSPECIFIED CHRONICITY (H): ICD-10-CM

## 2019-02-12 LAB — INR POINT OF CARE: 3.1 (ref 0.86–1.14)

## 2019-02-12 PROCEDURE — 36416 COLLJ CAPILLARY BLOOD SPEC: CPT

## 2019-02-12 PROCEDURE — 99207 ZZC NO CHARGE NURSE ONLY: CPT

## 2019-02-12 PROCEDURE — 85610 PROTHROMBIN TIME: CPT | Mod: QW

## 2019-02-12 RX ORDER — WARFARIN SODIUM 5 MG/1
TABLET ORAL
Qty: 126 TABLET | Refills: 1 | Status: SHIPPED | OUTPATIENT
Start: 2019-02-12 | End: 2019-04-22

## 2019-02-12 NOTE — PROGRESS NOTES
ANTICOAGULATION FOLLOW-UP CLINIC VISIT    Patient Name:  Harley Alvarado  Date:  2/12/2019  Contact Type:  Face to Face    SUBJECTIVE:     Patient Findings     Positives:   Extra doses (Pt states he increased his Warfarin dose to 7.5mg everyday about 2 weeks ago due to his increased greens.), Change in diet/appetite (Pt drinking a smoothie almost everyday containing kale and spinach.)    Comments:   2nd INR result at 3.1; however, patient prefers to continue taking 7.5mg Warfarin everyday due to drinking green vegetable smoothie.           OBJECTIVE    INR Protime   Date Value Ref Range Status   02/12/2019 3.1 (A) 0.86 - 1.14 Final       ASSESSMENT / PLAN  INR assessment THER    Recheck INR In: 4 WEEKS    INR Location Clinic      Anticoagulation Summary  As of 2/12/2019    INR goal:   2.0-3.0   TTR:   64.9 % (1.3 y)   INR used for dosing:   3.1! (2/12/2019)   Warfarin maintenance plan:   7.5 mg (5 mg x 1.5) every day   Full warfarin instructions:   7.5 mg every day   Weekly warfarin total:   52.5 mg   Plan last modified:   Azul Garber RN (2/12/2019)   Next INR check:   3/12/2019   Target end date:   Indefinite    Indications    Long-term (current) use of anticoagulants [Z79.01] [Z79.01]  Pulmonary embolism (H) [I26.99]             Anticoagulation Episode Summary     INR check location:       Preferred lab:       Send INR reminders to:   [facility]    Comments:         Anticoagulation Care Providers     Provider Role Specialty Phone number    Cory Acosta PA-C Responsible Physician Assistant [phone]            See the Encounter Report to view Anticoagulation Flowsheet and Dosing Calendar (Go to Encounters tab in chart review, and find the Anticoagulation Therapy Visit)        Azul Garber RN

## 2019-03-12 ENCOUNTER — ANTICOAGULATION THERAPY VISIT (OUTPATIENT)
Dept: NURSING | Facility: CLINIC | Age: 64
End: 2019-03-12
Payer: COMMERCIAL

## 2019-03-12 DIAGNOSIS — I26.99 OTHER PULMONARY EMBOLISM WITHOUT ACUTE COR PULMONALE, UNSPECIFIED CHRONICITY (H): ICD-10-CM

## 2019-03-12 LAB — INR POINT OF CARE: 2.5 (ref 0.86–1.14)

## 2019-03-12 PROCEDURE — 99207 ZZC NO CHARGE NURSE ONLY: CPT

## 2019-03-12 PROCEDURE — 36416 COLLJ CAPILLARY BLOOD SPEC: CPT

## 2019-03-12 PROCEDURE — 85610 PROTHROMBIN TIME: CPT | Mod: QW

## 2019-03-12 NOTE — PROGRESS NOTES
ANTICOAGULATION FOLLOW-UP CLINIC VISIT    Patient Name:  Harley Alvarado  Date:  3/12/2019  Contact Type:  Face to Face    SUBJECTIVE:     Patient Findings     Positives:   Change in health (Retinal tear, had laser surgery on L eye on 19), Change in medications (Pt will be having chemotherapy infusion on 19 for proteinuria.  Pt states he gets the infusion every 9 months or so and states his kidneys function at 50%.)           OBJECTIVE    INR Protime   Date Value Ref Range Status   2019 2.5 (A) 0.86 - 1.14 Final       ASSESSMENT / PLAN  INR assessment THER    Recheck INR In: 2 WEEKS    INR Location Clinic      Anticoagulation Summary  As of 3/12/2019    INR goal:   2.0-3.0   TTR:   65.9 % (1.4 y)   INR used for dosin.5 (3/12/2019)   Warfarin maintenance plan:   7.5 mg (5 mg x 1.5) every day   Full warfarin instructions:   7.5 mg every day   Weekly warfarin total:   52.5 mg   No change documented:   Azul Garber RN   Plan last modified:   Azul Garber RN (2019)   Next INR check:   3/26/2019   Target end date:   Indefinite    Indications    Long-term (current) use of anticoagulants [Z79.01] [Z79.01]  Pulmonary embolism (H) [I26.99]             Anticoagulation Episode Summary     INR check location:       Preferred lab:       Send INR reminders to:   [facility]    Comments:         Anticoagulation Care Providers     Provider Role Specialty Phone number    Cory Acosta PA-C Responsible Physician Assistant [phone]            See the Encounter Report to view Anticoagulation Flowsheet and Dosing Calendar (Go to Encounters tab in chart review, and find the Anticoagulation Therapy Visit)        Azul Garber RN

## 2019-03-21 DIAGNOSIS — Z79.01 LONG TERM CURRENT USE OF ANTICOAGULANTS WITH INR GOAL OF 2.0-3.0: Primary | ICD-10-CM

## 2019-03-21 DIAGNOSIS — Z79.01 LONG TERM CURRENT USE OF ANTICOAGULANT THERAPY: ICD-10-CM

## 2019-03-21 DIAGNOSIS — I26.99 OTHER PULMONARY EMBOLISM WITHOUT ACUTE COR PULMONALE, UNSPECIFIED CHRONICITY (H): ICD-10-CM

## 2019-03-21 NOTE — TELEPHONE ENCOUNTER
"Requested Prescriptions   Pending Prescriptions Disp Refills     warfarin (COUMADIN) 5 MG tablet [Pharmacy Med Name: WARFARIN SOD 5MG TABLETS (PEACH)]    Last Written Prescription Date:  2/12/19  Last Fill Quantity: 126 tablet,  # refills: 1   Last office visit: 5/30/2018 with prescribing provider:  Ramy   Future Office Visit:     120 tablet 0     Sig: TAKE 1 TABLET BY MOUTH EVERY SUNDAY AND 1 AND 1/2 TABLETS BY MOUTH ALL OTHER DAYS OR AS DIRECTED BY INR CLINIC    Vitamin K Antagonists Failed - 3/21/2019  3:47 AM       Failed - INR is within goal in the past 6 weeks    Confirm INR is within goal in the past 6 weeks.     Recent Labs   Lab Test 03/12/19   INR 2.5*                      Passed - Recent (12 mo) or future (30 days) visit within the authorizing provider's specialty    Patient had office visit in the last 12 months or has a visit in the next 30 days with authorizing provider or within the authorizing provider's specialty.  See \"Patient Info\" tab in inbasket, or \"Choose Columns\" in Meds & Orders section of the refill encounter.             Passed - Medication is active on med list       Passed - Patient is 18 years of age or older          "

## 2019-03-22 RX ORDER — WARFARIN SODIUM 5 MG/1
TABLET ORAL
Qty: 126 TABLET | Refills: 1 | Status: SHIPPED | OUTPATIENT
Start: 2019-03-22 | End: 2019-11-22

## 2019-03-26 ENCOUNTER — ANTICOAGULATION THERAPY VISIT (OUTPATIENT)
Dept: NURSING | Facility: CLINIC | Age: 64
End: 2019-03-26
Payer: COMMERCIAL

## 2019-03-26 DIAGNOSIS — I26.99 OTHER PULMONARY EMBOLISM WITHOUT ACUTE COR PULMONALE, UNSPECIFIED CHRONICITY (H): ICD-10-CM

## 2019-03-26 LAB — INR POINT OF CARE: 2.7 (ref 0.86–1.14)

## 2019-03-26 PROCEDURE — 36416 COLLJ CAPILLARY BLOOD SPEC: CPT

## 2019-03-26 PROCEDURE — 85610 PROTHROMBIN TIME: CPT | Mod: QW

## 2019-03-26 NOTE — PROGRESS NOTES
ANTICOAGULATION FOLLOW-UP CLINIC VISIT    Patient Name:  Harley Alvarado  Date:  3/26/2019  Contact Type:  Face to Face    SUBJECTIVE:     Patient Findings     Positives:   Change in medications (Pt did NOT have to have chemotherapy infusion as was planned, he states his chronic kidney condition is in remission..)           OBJECTIVE    INR Protime   Date Value Ref Range Status   2019 2.7 (A) 0.86 - 1.14 Final       ASSESSMENT / PLAN  INR assessment THER    Recheck INR In: 4 WEEKS    INR Location Clinic      Anticoagulation Summary  As of 3/26/2019    INR goal:   2.0-3.0   TTR:   66.8 % (1.4 y)   INR used for dosin.7 (3/26/2019)   Warfarin maintenance plan:   7.5 mg (5 mg x 1.5) every day   Full warfarin instructions:   7.5 mg every day   Weekly warfarin total:   52.5 mg   No change documented:   Azul Garber RN   Plan last modified:   Azul Garber RN (2019)   Next INR check:   2019   Target end date:   Indefinite    Indications    Long-term (current) use of anticoagulants [Z79.01] [Z79.01]  Pulmonary embolism (H) [I26.99]             Anticoagulation Episode Summary     INR check location:       Preferred lab:       Send INR reminders to:   [facility]    Comments:         Anticoagulation Care Providers     Provider Role Specialty Phone number    Cory Acosta PA-C Responsible Physician Assistant [phone]            See the Encounter Report to view Anticoagulation Flowsheet and Dosing Calendar (Go to Encounters tab in chart review, and find the Anticoagulation Therapy Visit)        Azul Garber RN

## 2019-04-12 ENCOUNTER — OFFICE VISIT (OUTPATIENT)
Dept: SLEEP MEDICINE | Facility: CLINIC | Age: 64
End: 2019-04-12
Payer: COMMERCIAL

## 2019-04-12 VITALS
DIASTOLIC BLOOD PRESSURE: 79 MMHG | SYSTOLIC BLOOD PRESSURE: 119 MMHG | BODY MASS INDEX: 24.43 KG/M2 | WEIGHT: 152 LBS | HEIGHT: 66 IN | HEART RATE: 62 BPM | RESPIRATION RATE: 15 BRPM | OXYGEN SATURATION: 98 %

## 2019-04-12 DIAGNOSIS — G47.33 OSA (OBSTRUCTIVE SLEEP APNEA): ICD-10-CM

## 2019-04-12 DIAGNOSIS — F51.04 PSYCHOPHYSIOLOGICAL INSOMNIA: Primary | ICD-10-CM

## 2019-04-12 PROCEDURE — 99214 OFFICE O/P EST MOD 30 MIN: CPT | Performed by: INTERNAL MEDICINE

## 2019-04-12 RX ORDER — ZOLPIDEM TARTRATE 10 MG/1
TABLET ORAL
Qty: 1 TABLET | Refills: 0 | Status: SHIPPED | OUTPATIENT
Start: 2019-04-12 | End: 2019-11-22

## 2019-04-12 ASSESSMENT — MIFFLIN-ST. JEOR: SCORE: 1422.22

## 2019-04-12 NOTE — PROGRESS NOTES
[facility]   Outpatient Sleep Medicine Consultation  April 12, 2019      Name: Harley Alvarado MRN# [id number]   Age: 63 year old YOB: 1955     Date of Consultation: April 12, 2019  Consultation is requested by: James Stroud MD  No address on file  Primary care provider: Cory Acosta       Reason for Sleep Consult:     Harley Alvarado is a 63 year old male who request evaluation for nightly snoring, interrupted breathing, non-restorative sleep for 5 years.           Assessment and Plan:     Summary Sleep Diagnoses:      Clinical signs and symptoms of obstructive sleep apnea    Psychophysiological insomnia with PRAMOD 21    Pulmonary embolism currently on anticoagulation    Membranous glomerulonephritis    History of depression    Gastroesophageal reflux disease    Summary Recommendations:      Sleep study with CPAP if AHI> 30    Zolpidem 10 mg for sleep initiaton    This patient has a prior inability to use a home sleep study and acknowledges difficulty understanding its application.  In addition he has significant evidence of insomnia that would interfere with the test.  Under the circumstances we would recommend supervised sleep study and electroencephalography both of which are available through polysomnography.      Summary Counseling:  See instructions    Counseling included a comprehensive review of diagnostic and therapeutic strategies as well as risks of inadequate therapy.  Educational materials provided in instructions.             History of Present Illness:     Harley Alvarado is a 63 year old male with clinical symptoms of sleep apnea (snoring, excessive daytime sleepiness, witnessed apneas), and some features of environment sleep disruption and psychophysiologcial insomnia. He was challenged by home study device in 2017 and unable to proceed. Although his [place] Sleepiness Scale is normal, he acknowledges nonrestorative sleep and the need to take caffeine to counter  daytime fatigue.  There is some evidence for irregular sleep schedule and total amount of sleep is rather unclear based on this.  Finally he acknowledges evidence for significant disruption of sleep related to excessive rumination as well as environmental sleep disorder with frequent noise and hypervigilance resulting in frequent awakenings during the night.        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     SCALES       SLEEP APNEA: [place] score  4       INSOMNIA:  Insomnia severity score: 21     SLEEPINESS: [place] sleepiness scale: 5    SLEEP COMPLAINTS:  Cardio-respiratory    Snoring- 7/week  Dyspnea- denies  Morning headaches or confusion-denies  Coexisting Lung disease: denies    Coexisting Heart disease: denies    Does patient have a bed partner: denies  Has bed partner been sleeping separately because of snoring:  denies            RLS Screen: When you try to relax in the evening or sleep at  night, do you ever have unpleasant, restless feelings in your  legs that can be relieved by walking or movement? denies    Periodic limb movement: denies    Narcolepsy:  denies sudden urges of sleep attacks    Cataplexy:  denies     Sleep paralysis:  denies      Hallucinations:   denies       Sleep Behaviors:    Leg symptoms/movements: denies    Motor restlessness:denies    Night terrors: denies    Bruxism: denies    Automatic behaviors: none    Other subjective complaints:    Anxiety or rumination denies    Pain and discomfort at  night: denies    Waking up with heart pounding or racing: denies    GERD or aspiration:denies         Parasomnia:   NREM - denies recurrent persistent confusional arousal, night eating, sleep  walking or sleep terrors   REM  - denies dream enactment; injuries   Safety:             Medications:     Current Outpatient Medications   Medication Sig     lisinopril (PRINIVIL/ZESTRIL) 20 MG tablet TAKE 1 TABLET(20 MG) BY MOUTH DAILY     pravastatin (PRAVACHOL) 40 MG tablet TAKE 1 TABLET BY MOUTH EVERY NIGHT AT BEDTIME (Patient not taking: Reported on 1/12/2019)     warfarin (COUMADIN) 5 MG tablet Take 7.5mg everyday OR as instructed by INR clinic     warfarin (COUMADIN) 5 MG tablet Take 7.5mg everyday OR as instructed by INR clinic     No current facility-administered medications for this visit.         No Known Allergies         Past Medical History:     Does not need 02 supplement at night   Past Medical History:   Diagnosis Date     Allergic conjunctivitis 4/13/2015     Benign essential hypertension 9/29/2017     Dyslipidemia 9/29/2017    Overview:  Created by Conversion     GERD (gastroesophageal reflux disease) 2/6/2016     Latent tuberculosis 9/29/2017    Overview:  Created by Conversion     Major depression, recurrent (H) 9/29/2017    Overview:  Created by Conversion  Replacement Utility updated for latest IMO load     Membranous glomerulonephritis 9/29/2017    Overview:  Created by Conversion [facility] Annotation: Aug 26 2011  5:18PM - Angely Pat: Stage 1, s/p biopsy  8/2011  Replacement Utility updated for latest IMO load     Nephropathy 12/30/2014     Pulmonary embolism (H) 12/30/2014             Past Surgical History:    Previous upper airway surgery- none  Past Surgical History:   Procedure Laterality Date     HERNIA REPAIR  2015            Social History:     Social History     Tobacco Use     Smoking status: Former Smoker     Smokeless tobacco: Never Used   Substance Use Topics     Alcohol use: No     Caffeine 2 in am           Family History:     No family history on file.     Sleep Family Hx:      GONZALO - none           Physical Examination:   There were no vitals taken for this visit.    Constitutional: . Awake, alert, cooperative, dressed casually, good eye contact, comfortably sitting in a chair, in no apparent distress  Mood: euthymic; affect congruent with full range and intensity.  Attention/Concentration:  Normal   Eyes: No icterus.  ENT: Mallampati Class: I.    Neck: Supple, no thyroid enlargement.   Pulmonary: Chest symmetric, lungs clear bilaterally and no crackles, wheezes or rales  Extremities:  No pedal edema.  Muscle/joint: Strength and tone normal   Skin:  No rash or significant lesions.   Gait Normal.  Neurologic: Alert, oriented x3, no focal neurological deficit, cranial nerves grossly normal            Data: All pertinent previous laboratory data reviewed     No results found for: PH, PHARTERIAL, PO2, [id number], SAT, PCO2, HCO3, BASEEXCESS, RAMA, BEB  No results found for: TSH  Lab Results   Component Value Date     (H) 10/02/2017     No results found for: HGB  Lab Results   Component Value Date    BUN 26 10/02/2017    CR 1.38 (H) 10/02/2017    CR 1.4 (A) 09/12/2017     Lab Results   Component Value Date    AST 24 10/02/2017    ALT 28 10/02/2017    ALKPHOS 60 10/02/2017    BILITOTAL 0.5 10/02/2017         Copy to: Cory Acosta MD 4/12/2019     Total time spent with patient: 30 min >50% counseling

## 2019-04-12 NOTE — NURSING NOTE
"Chief Complaint   Patient presents with     RECHECK     Snoring is worse and stops breathing,  Failed the Hst and did not redo in 2017.  LOV 11/28/17 Dr. Castañeda       Initial /79   Pulse 62   Resp 15   Ht 1.676 m (5' 6\")   Wt 68.9 kg (152 lb)   SpO2 98%   BMI 24.53 kg/m   Estimated body mass index is 24.53 kg/m  as calculated from the following:    Height as of this encounter: 1.676 m (5' 6\").    Weight as of this encounter: 68.9 kg (152 lb).    Medication Reconciliation: complete    Azul Son LPN      "

## 2019-04-12 NOTE — PATIENT INSTRUCTIONS
"MY TREATMENT INFORMATION FOR SLEEP APNEA-  Harley Alvarado    DOCTOR : MICHAEL GARIBAY      We would like to share some general information about sleep to help us work together to get better sleep for you.     Why do we sleep?   -clear toxins from the brain   -remove unnecessary neural connections that accumulate during the day   -enhance memory and learning   Without adequate sleep, our brain may become impaired in a manner that is similar to being intoxicated.       How much sleep do we need?   -The average adult needs 7-8 hours of sleep while young adolescents need up to 9 hours in order to function at their best.   -Between 12 and 20 years of age our sleep is often delayed up to an hour compared to older or younger people.   Aim for 7-8 sleep and a regular schedule; it may take a week or more to eliminate the effects of chronic insufficient sleep.       When should I sleep?   The timing of sleep is determined genetically for each of us.  Some of us are \"night owls\" and others are \"larks\".   The timing of sleep and the amount of sleep we need changes with our age.   Try to schedule your sleep time to fit your natural sleep schedule when you are not busy with school, work, or travel.       What if my sleep schedule doesn't fit my work schedule?   -If you have no trouble falling asleep or getting up during the work week, your work schedule is probably well-matched to your natural sleep schedule.   -You may benefit from a sleep  who can help support you to get to the best schedule.       What are some good habits to start with?   -Your bedroom is for sleeping and should be quiet, comfortably cool and dark before you lie down.   -You should not have electronic devices such as phones or computers in your bedroom.   -Your bedtime and awakening time should fit your natural tendency to fall asleep and wake up and should not vary much between weekdays and weekends.   -No caffeine within 6 hours or alcohol within two " "hours of bedtime  Be careful and regular with your sleep-you will feel better and think better.      Am I having a sleep study at a sleep center?  Make sure you have an appointment for the study before you leave!    Am I having a home sleep study?  Watch this video:  [email or web address].[email or web address]/watch?v=CteI_[email or web address]&list=[email or web address]  Please verify your insurance coverage with your insurance carrier    Frequently asked questions:  1. What is Obstructive Sleep Apnea (GONZALO)? GONZALO is the most common type of sleep apnea. Apnea means, \"without breath.\"  Apnea is most often caused by narrowing or collapse of the upper airway as muscles relax during sleep.   Almost everyone has occasional apneas. Most people with sleep apnea have had brief interruptions at night frequently for many years.  The severity of sleep apnea is related to how frequent and severe the events are.   2. What are the consequences of GONZALO? Symptoms include: feeling sleepy during the day, snoring loudly, gasping or stopping of breathing, trouble sleeping, and occasionally morning headaches or heartburn at night.  Sleepiness can be serious and even increase the risk of falling asleep while driving. Other health consequences may include development of high blood pressure and other cardiovascular disease in persons who are susceptible. Untreated GONZALO  can contribute to heart disease, stroke and diabetes.   3. What are the treatment options? In most situations, sleep apnea is a lifelong disease that must be managed with daily therapy. Medications are not effective for sleep apnea and surgery is generally not considered until other therapies have been tried. Your treatment is your choice . Continuous Positive Airway (CPAP) works right away and is the therapy that is effective in nearly everyone. An oral device to hold your jaw forward is usually the next most reliable option. Other options include postioning devices (to keep you off your back), " weight loss, and surgery including a tongue pacing device. There is more detail about some of these options below.    Important tips for using CPAP and similar devices   Know your equipment:  CPAP is continuous positive airway pressure that prevents obstructive sleep apnea by keeping the throat from collapsing while you are sleeping. In most cases, the device is  smart  and can slowly self-adjusts if your throat collapses and keeps a record every day of how well you are treated-this information is available to you and your care team.  BPAP is bilevel positive airway pressure that keeps your throat open and also assists each breath with a pressure boost to maintain adequate breathing.  Special kinds of BPAP are used in patients who have inadequate breathing from lung or heart disease. In most cases, the device is  smart  and can slowly self-adjusts to assist breathing. Like CPAP, the device keeps a record of how well you are treated.  Your mask is your connection to the device. You get to choose what feels most comfortable and the staff will help to make sure if fits. Here: are some examples of the different masks that are available:       Key points to remember on your journey with sleep apnea:  1. Sleep study.  PAP devices often need to be adjusted during a sleep study to show that they are effective and adjusted right.  2. Good tips to remember: Try wearing just the mask during a quiet time during the day so your body adapts to wearing it. A humidifier is recommended for comfort in most cases to prevent drying of your nose and throat. Allergy medication from your provider may help you if you are having nasal congestion.  3. Getting settled-in. It takes more than one night for most of us to get used to wearing a mask. Try wearing just the mask during a quiet time during the day so your body adapts to wearing it. A humidifier is recommended for comfort in most cases. Our team will work with you carefully on the  first day and will be in contact within 4 days and again at 2 and 4 weeks for advice and remote device adjustments. Your therapy is evaluated by the device each day.   4. Use it every night. The more you are able to sleep naturally for 7-8 hours, the more likely you will have good sleep and to prevent health risks or symptoms from sleep apnea. Even if you use it 4 hours it helps. Occasionally all of us are unable to use a medical therapy, in sleep apnea, it is not dangerous to miss one night.   5. Communicate. Call our skilled team on the number provided on the first day if your visit for problems that make it difficult to wear the device. Over 2 out of 3 patients can learn to wear the device long-term with help from our team. Remember to call our team or your sleep providers if you are unable to wear the device as we may have other solutions for those who cannot adapt to mask CPAP therapy. It is recommended that you sleep your sleep provider within the first 3 months and yearly after that if you are not having problems.   6. Use it for your health. We encourage use of CPAP masks during daytime quiet periods to allow your face and brain to adapt to the sensation of CPAP so that it will be a more natural sensation to awaken to at night or during naps. This can be very useful during the first few weeks or months of adapting to CPAP though it does not help medically to wear CPAP during wakefulness and  should not be used as a strategy just to meet guidelines.  7. Take care of your equipment. Make sure you clean your mask and tubing using directions every day and that your filter and mask are replaced as recommended or if they are not working.     BESIDES CPAP, WHAT OTHER THERAPIES ARE THERE?    Positioning Device  Positioning devices are generally used when sleep apnea is mild and only occurs on your back.This example shows a pillow that straps around the waist. It may be appropriate for those whose sleep study shows  milder sleep apnea that occurs primarily when lying flat on one's back. Preliminary studies have shown benefit but effectiveness at home may need to be verified by a home sleep test. These devices are generally not covered by medical insurance.  Examples of devices that maintain sleeping on the back to prevent snoring and mild sleep apnea.    Belt type body positioner  Http://SUNDAYTOZ.SpeechTrans/    Electronic reminder  Http://nightshifttherapy.com/  Http://[email or web address] English.SpeechTrans.au/      Oral Appliance  What is oral appliance therapy?  An oral appliance device fits on your teeth at night like a retainer used after having braces. The device is made by a specialized dentist and requires several visits over 1-2 months before a manufactured device is made to fit your teeth and is adjusted to prevent your sleep apnea. Once an oral device is working properly, snoring should be improved. A home sleep test may be recommended at that time if to determine whether the sleep apnea is adequately treated.       Some things to remember:  -Oral devices are often, but not always, covered by your medical insurance. Be sure to check with your insurance provider.   -If you are referred for oral therapy, you will be given a list of specialized dentists to consider or you may choose to visit the Web site of the American Academy of Dental Sleep Medicine  -Oral devices are less likely to work if you have severe sleep apnea or are extremely overweight.     More detailed information  An oral appliance is a small acrylic device that fits over the upper and lower teeth  (similar to a retainer or a mouth guard). This device slightly moves jaw forward, which moves the base of the tongue forward, opens the airway, improves breathing for effective treat snoring and obstructive sleep apnea in perhaps 7 out of 10 people .  The best working devices are custom-made by a dental device  after a mold is made of the teeth 1, 2, 3.  When is an oral  appliance indicated?  Oral appliance therapy is recommended as a first-line treatment for patients with primary snoring, mild sleep apnea, and for patients with moderate sleep apnea who prefer appliance therapy to use of CPAP4, 5. Severity of sleep apnea is determined by sleep testing and is based on the number of respiratory events per hour of sleep.   How successful is oral appliance therapy?  The success rate of oral appliance therapy in patients with mild sleep apnea is 75-80% while in patients with moderate sleep apnea it is 50-70%. The chance of success in patients with severe sleep apnea is 40-50%. The research also shows that oral appliances have a beneficial effect on the cardiovascular health of GONZALO patients at the same magnitude as CPAP therapy7.  Oral appliances should be a second-line treatment in cases of severe sleep apnea, but if not completely successful then a combination therapy utilizing CPAP plus oral appliance therapy may be effective. Oral appliances tend to be effective in a broad range of patients although studies show that the patients who have the highest success are females, younger patients, those with milder disease, and less severe obesity. 3, 6.   Finding a dentist that practices dental sleep medicine  Specific training is available through the American Academy of Dental Sleep Medicine for dentists interested in working in the field of sleep. To find a dentist who is educated in the field of sleep and the use of oral appliances, near you, visit the Web site of the American Academy of Dental Sleep Medicine.    References  1. Issa et al. Objectively measured vs self-reported compliance during oral appliance therapy for sleep-disordered breathing. Chest 2013; 144(5): 1203-8770.  2. Mika et al. Objective measurement of compliance during oral appliance therapy for sleep-disordered breathing. Thorax 2013; 68(1): 91-96.  3. Jatin et al. Mandibular advancement devices in  620 men and women with GONZALO and snoring: tolerability and predictors of treatment success. Chest 2004; 125: 8602-9164.  4. Allan et al. Oral appliances for snoring and GONZALO: a review. Sleep 2006; 29: 244-262.  5. Denisa et al. Oral appliance treatment for GONZALO: an update. J Clin Sleep Med 2014; 10(2): 215-227.  6. Jessie et al. Predictors of OSAH treatment outcome. J Dent Res 2007; 86: 5044-8701.      Weight Loss:    Weight loss is a long-term strategy that may improve sleep apnea in some patients.    Weight management is a personal decision and the decision should be based on your interest and the potential benefits.  If you are interested in exploring weight loss strategies, the following discussion covers the impact on weight loss on sleep apnea and the approaches that may be successful.    Being overweight does not necessarily mean you will have health consequences.  Those who have BMI over 35 or over 27 with existing medical conditions carries greater risk.   Weight loss decreases severity of sleep apnea in most people with obesity. For those with mild obesity who have developed snoring with weight gain, even 15-30 pound weight loss can improve and occasionally eliminate sleep apnea.  Structured and life-long dietary and health habits are necessary to lose weight and keep healthier weight levels.     Though there may be significant health benefits from weight loss, long-term weight loss is very difficult to achieve- studies show success with dietary management in less than 10% of people. In addition, substantial weight loss may require years of dietary control and may be difficult if patients have severe obesity. In these cases, surgical management may be considered.  Finally, older individuals who have tolerated obesity without health complications may be less likely to benefit from weight loss strategies.        Your BMI is Body mass index is 24.53 kg/m .  Weight management is a personal decision.  If  you are interested in exploring weight loss strategies, the following discussion covers the approaches that may be successful. Body mass index (BMI) is one way to tell whether you are at a healthy weight, overweight, or obese. It measures your weight in relation to your height.  A BMI of 18.5 to 24.9 is in the healthy range. A person with a BMI of 25 to 29.9 is considered overweight, and someone with a BMI of 30 or greater is considered obese. More than two-thirds of American adults are considered overweight or obese.  Being overweight or obese increases the risk for further weight gain. Excess weight may lead to heart disease and diabetes.  Creating and following plans for healthy eating and physical activity may help you improve your health.  Weight control is part of healthy lifestyle and includes exercise, emotional health, and healthy eating habits. Careful eating habits lifelong are the mainstay of weight control. Though there are significant health benefits from weight loss, long-term weight loss with diet alone may be very difficult to achieve- studies show long-term success with dietary management in less than 10% of people. Attaining a healthy weight may be especially difficult to achieve in those with severe obesity. In some cases, medications, devices and surgical management might be considered.  What can you do?  If you are overweight or obese and are interested in methods for weight loss, you should discuss this with your provider.     Consider reducing daily calorie intake by 500 calories.     Keep a food journal.     Avoiding skipping meals, consider cutting portions instead.    Diet combined with exercise helps maintain muscle while optimizing fat loss. Strength training is particularly important for building and maintaining muscle mass. Exercise helps reduce stress, increase energy, and improves fitness. Increasing exercise without diet control, however, may not burn enough calories to loose  weight.       Start walking three days a week 10-20 minutes at a time    Work towards walking thirty minutes five days a week     Eventually, increase the speed of your walking for 1-2 minutes at time    In addition, we recommend that you review healthy lifestyles and methods for weight loss available through the National Institutes of Health patient information sites:  http://win.niddk.nih.gov/publications/index.htm    And look into health and wellness programs that may be available through your health insurance provider, employer, local community center, or [employer] club.          Surgery:    Surgery for obstructive sleep apnea is considered generally only when other therapies fail to work. Surgery may be discussed with you if you are having a difficult time tolerating CPAP and or when there is an abnormal structure that requires surgical correction.  Nose and throat surgeries often enlarge the airway to prevent collapse.  Most of these surgeries create pain for 1-2 weeks and up to half of the most common surgeries are not effective throughout life.  You should carefully discuss the benefits and drawbacks to surgery with your sleep provider and surgeon to determine if it is the best solution for you.   More information  Surgery for GONZALO is directed at areas that are responsible for narrowing or complete obstruction of the airway during sleep.  There are a wide range of procedures available to enlarge and/or stabilize the airway to prevent blockage of breathing in the three major areas where it can occur: the palate, tongue, and nasal regions.  Successful surgical treatment depends on the accurate identification of the factors responsible for obstructive sleep apnea in each person.  A personalized approach is required because there is no single treatment that works well for everyone.  Because of anatomic variation, consultation with an examination by a sleep surgeon is a critical first step in determining what surgical  options are best for each patient.  In some cases, examination during sedation may be recommended in order to guide the selection of procedures.  Patients will be counseled about risks and benefits as well as the typical recovery course after surgery. Surgery is typically not a cure for a person s GONZALO.  However, surgery will often significantly improve one s GONZALO severity (termed  success rate ).  Even in the absence of a cure, surgery will decrease the cardiovascular risk associated with OSA7; improve overall quality of life8 (sleepiness, functionality, sleep quality, etc).      Palate Procedures:  Patients with GONZALO often have narrowing of their airway in the region of their tonsils and uvula.  The goals of palate procedures are to widen the airway in this region as well as to help the tissues resist collapse.  Modern palate procedure techniques focus on tissue conservation and soft tissue rearrangement, rather than tissue removal.  Often the uvula is preserved in this procedure. Residual sleep apnea is common in patient after pharyngoplasty with an average reduction in sleep apnea events of 33%2.      Tongue Procedures:  ExamWhile patients are awake, the muscles that surround the throat are active and keep this region open for breathing. These muscles relax during sleep, allowing the tongue and other structures to collapse and block breathing.  There are several different tongue procedures available.  Selection of a tongue base procedure depends on characteristics seen on physical exam.  Generally, procedures are aimed at removing bulky tissues in this area or preventing the back of the tongue from falling back during sleep.  Success rates for tongue surgery range from 50-62%3.    Hypoglossal Nerve Stimulation:  Hypoglossal nerve stimulation has recently received approval from the [place] Food and Drug Administration for the treatment of obstructive sleep apnea.  This is based on research showing that the  system was safe and effective in treating sleep apnea6.  Results showed that the median AHI score decreased 68%, from 29.3 to 9.0. This therapy uses an implant system that senses breathing patterns and delivers mild stimulation to airway muscles, which keeps the airway open during sleep.  The system consists of three fully implanted components: a small generator (similar in size to a pacemaker), a breathing sensor, and a stimulation lead.  Using a small handheld remote, a patient turns the therapy on before bed and off upon awakening.    Candidates for this device must be greater than 22 years of age, have moderate to severe GONZALO (AHI between 20-65), BMI less than 32, have tried CPAP/oral appliance without success, and have appropriate upper airway anatomy (determined by a sleep endoscopy performed by Dr. Edmond).    Hypoglossal Nerve Stimulation Pathway:    The sleep surgeon s office will work with the patient through the insurance prior-authorization process (including communications and appeals).    Nasal Procedures:  Nasal obstruction can interfere with nasal breathing during the day and night.  Studies have shown that relief of nasal obstruction can improve the ability of some patients to tolerate positive airway pressure therapy for obstructive sleep apnea1.  Treatment options include medications such as nasal saline, topical corticosteroid and antihistamine sprays, and oral medications such as antihistamines or decongestants. Non-surgical treatments can include external nasal dilators for selected patients. If these are not successful by themselves, surgery can improve the nasal airway either alone or in combination with these other options.      Combination Procedures:  Combination of surgical procedures and other treatments may be recommended, particularly if patients have more than one area of narrowing or persistent positional disease.  The success rate of combination surgery ranges from 66-80%2,3.     References  1. Sangeeta WISE. The Role of the Nose in Snoring and Obstructive Sleep Apnoea: An Update.  Eur Arch Otorhinolaryngol. 2011; 268: 1365-73.  2.  Gordy SM; Chanelle JA; Ubaldo JR; Pallanch JF; Sanjuana MB; Lavelle SG; Iva GONZALEZ. Surgical modifications of the upper airway for obstructive sleep apnea in adults: a systematic review and meta-analysis. SLEEP 2010;33(10):5365-2067. Edson FULLER. Hypopharyngeal surgery in obstructive sleep apnea: an evidence-based medicine review.  Arch Otolaryngol Head Neck Surg. 2006 Feb;132(2):206-13.  3. Iam YH1, Mela Y, Abebe RAYA. The efficacy of anatomically based multilevel surgery for obstructive sleep apnea. Otolaryngol Head Neck Surg. 2003 Oct;129(4):327-35.  4. Edson FULLER, Goldberg A. Hypopharyngeal Surgery in Obstructive Sleep Apnea: An Evidence-Based Medicine Review. Arch Otolaryngol Head Neck Surg. 2006 Feb;132(2):206-13.  5. Kadeem PJ et al. Upper-Airway Stimulation for Obstructive Sleep Apnea.  N Engl J Med. 2014 Jan 9;370(2):139-49.  6. Morro Y et al. Increased Incidence of Cardiovascular Disease in Middle-aged Men with Obstructive Sleep Apnea. Am J Respir Crit Care Med; 2002 166: 159-165  7. Stafford EM et al. Studying Life Effects and Effectiveness of Palatopharyngoplasty (SLEEP) study: Subjective Outcomes of Isolated Uvulopalatopharyngoplasty. Otolaryngol Head Neck Surg. 2011; 144: 623-631.          MY TREATMENT INFORMATION FOR SLEEP APNEA-  Harley Alvarado    DOCTOR : MICHAEL GARIBAY  SLEEP CENTER :      MY CONTACT NUMBER:     Am I having a sleep study at a sleep center?  Make sure you have an appointment for the study before you leave!    Am I having a home sleep study?  Watch this video:  [email or web address]/watch?v=[email or web address]  Please verify your insurance coverage with your insurance carrier    Frequently asked questions:  1. What is Obstructive Sleep Apnea (GONZALO)? GONZALO is the most common type of sleep apnea. Apnea  "means, \"without breath.\"  Apnea is most often caused by narrowing or collapse of the upper airway as muscles relax during sleep.   Almost everyone has occasional apneas. Most people with sleep apnea have had brief interruptions at night frequently for many years.  The severity of sleep apnea is related to how frequent and severe the events are.   2. What are the consequences of GONZALO? Symptoms include: feeling sleepy during the day, snoring loudly, gasping or stopping of breathing, trouble sleeping, and occasionally morning headaches or heartburn at night.  Sleepiness can be serious and even increase the risk of falling asleep while driving. Other health consequences may include development of high blood pressure and other cardiovascular disease in persons who are susceptible. Untreated GONZALO  can contribute to heart disease, stroke and diabetes.   3. What are the treatment options? In most situations, sleep apnea is a lifelong disease that must be managed with daily therapy. Medications are not effective for sleep apnea and surgery is generally not considered until other therapies have been tried. Your treatment is your choice . Continuous Positive Airway (CPAP) works right away and is the therapy that is effective in nearly everyone. An oral device to hold your jaw forward is usually the next most reliable option. Other options include postioning devices (to keep you off your back), weight loss, and surgery including a tongue pacing device. There is more detail about some of these options below.    Important tips for using CPAP and similar devices   Know your equipment:  CPAP is continuous positive airway pressure that prevents obstructive sleep apnea by keeping the throat from collapsing while you are sleeping. In most cases, the device is  smart  and can slowly self-adjusts if your throat collapses and keeps a record every day of how well you are treated-this information is available to you and your care team.  BPAP " is bilevel positive airway pressure that keeps your throat open and also assists each breath with a pressure boost to maintain adequate breathing.  Special kinds of BPAP are used in patients who have inadequate breathing from lung or heart disease. In most cases, the device is  smart  and can slowly self-adjusts to assist breathing. Like CPAP, the device keeps a record of how well you are treated.  Your mask is your connection to the device. You get to choose what feels most comfortable and the staff will help to make sure if fits. Here: are some examples of the different masks that are available:       Key points to remember on your journey with sleep apnea:  8. Sleep study.  PAP devices often need to be adjusted during a sleep study to show that they are effective and adjusted right.  9. Good tips to remember: Try wearing just the mask during a quiet time during the day so your body adapts to wearing it. A humidifier is recommended for comfort in most cases to prevent drying of your nose and throat. Allergy medication from your provider may help you if you are having nasal congestion.  10. Getting settled-in. It takes more than one night for most of us to get used to wearing a mask. Try wearing just the mask during a quiet time during the day so your body adapts to wearing it. A humidifier is recommended for comfort in most cases. Our team will work with you carefully on the first day and will be in contact within 4 days and again at 2 and 4 weeks for advice and remote device adjustments. Your therapy is evaluated by the device each day.   11. Use it every night. The more you are able to sleep naturally for 7-8 hours, the more likely you will have good sleep and to prevent health risks or symptoms from sleep apnea. Even if you use it 4 hours it helps. Occasionally all of us are unable to use a medical therapy, in sleep apnea, it is not dangerous to miss one night.   12. Communicate. Call our skilled team on the  number provided on the first day if your visit for problems that make it difficult to wear the device. Over 2 out of 3 patients can learn to wear the device long-term with help from our team. Remember to call our team or your sleep providers if you are unable to wear the device as we may have other solutions for those who cannot adapt to mask CPAP therapy. It is recommended that you sleep your sleep provider within the first 3 months and yearly after that if you are not having problems.   13. Use it for your health. We encourage use of CPAP masks during daytime quiet periods to allow your face and brain to adapt to the sensation of CPAP so that it will be a more natural sensation to awaken to at night or during naps. This can be very useful during the first few weeks or months of adapting to CPAP though it does not help medically to wear CPAP during wakefulness and  should not be used as a strategy just to meet guidelines.  14. Take care of your equipment. Make sure you clean your mask and tubing using directions every day and that your filter and mask are replaced as recommended or if they are not working.     BESIDES CPAP, WHAT OTHER THERAPIES ARE THERE?    Positioning Device  Positioning devices are generally used when sleep apnea is mild and only occurs on your back.This example shows a pillow that straps around the waist. It may be appropriate for those whose sleep study shows milder sleep apnea that occurs primarily when lying flat on one's back. Preliminary studies have shown benefit but effectiveness at home may need to be verified by a home sleep test. These devices are generally not covered by medical insurance.  Examples of devices that maintain sleeping on the back to prevent snoring and mild sleep apnea.    Belt type body positioner  Http://[employer] [employer]/    Electronic reminder  Http://nightshifttherapy.com/  Http://[email or web address] [employer].au/      Oral Appliance  What is oral appliance therapy?  An oral  appliance device fits on your teeth at night like a retainer used after having braces. The device is made by a specialized dentist and requires several visits over 1-2 months before a manufactured device is made to fit your teeth and is adjusted to prevent your sleep apnea. Once an oral device is working properly, snoring should be improved. A home sleep test may be recommended at that time if to determine whether the sleep apnea is adequately treated.       Some things to remember:  -Oral devices are often, but not always, covered by your medical insurance. Be sure to check with your insurance provider.   -If you are referred for oral therapy, you will be given a list of specialized dentists to consider or you may choose to visit the Web site of the American Academy of Dental Sleep Medicine  -Oral devices are less likely to work if you have severe sleep apnea or are extremely overweight.     More detailed information  An oral appliance is a small acrylic device that fits over the upper and lower teeth  (similar to a retainer or a mouth guard). This device slightly moves jaw forward, which moves the base of the tongue forward, opens the airway, improves breathing for effective treat snoring and obstructive sleep apnea in perhaps 7 out of 10 people .  The best working devices are custom-made by a dental device  after a mold is made of the teeth 1, 2, 3.  When is an oral appliance indicated?  Oral appliance therapy is recommended as a first-line treatment for patients with primary snoring, mild sleep apnea, and for patients with moderate sleep apnea who prefer appliance therapy to use of CPAP4, 5. Severity of sleep apnea is determined by sleep testing and is based on the number of respiratory events per hour of sleep.   How successful is oral appliance therapy?  The success rate of oral appliance therapy in patients with mild sleep apnea is 75-80% while in patients with moderate sleep apnea it is 50-70%.  The chance of success in patients with severe sleep apnea is 40-50%. The research also shows that oral appliances have a beneficial effect on the cardiovascular health of GONZALO patients at the same magnitude as CPAP therapy7.  Oral appliances should be a second-line treatment in cases of severe sleep apnea, but if not completely successful then a combination therapy utilizing CPAP plus oral appliance therapy may be effective. Oral appliances tend to be effective in a broad range of patients although studies show that the patients who have the highest success are females, younger patients, those with milder disease, and less severe obesity. 3, 6.   Finding a dentist that practices dental sleep medicine  Specific training is available through the American Academy of Dental Sleep Medicine for dentists interested in working in the field of sleep. To find a dentist who is educated in the field of sleep and the use of oral appliances, near you, visit the Web site of the American Academy of Dental Sleep Medicine.    References  1. Issa et al. Objectively measured vs self-reported compliance during oral appliance therapy for sleep-disordered breathing. Chest 2013; 144(5): 1692-5294.  2. Mika et al. Objective measurement of compliance during oral appliance therapy for sleep-disordered breathing. Thorax 2013; 68(1): 91-96.  3. Jatin, et al. Mandibular advancement devices in 620 men and women with GONZALO and snoring: tolerability and predictors of treatment success. Chest 2004; 125: 9280-4274.  4. Allan, et al. Oral appliances for snoring and GONZALO: a review. Sleep 2006; 29: 244-262.  5. Densia et al. Oral appliance treatment for GONZALO: an update. J Clin Sleep Med 2014; 10(2): 215-227.  6. Jessie et al. Predictors of OSAH treatment outcome. J Dent Res 2007; 86: 1970-9747.      Weight Loss:    Weight loss is a long-term strategy that may improve sleep apnea in some patients.    Weight management is a personal  decision and the decision should be based on your interest and the potential benefits.  If you are interested in exploring weight loss strategies, the following discussion covers the impact on weight loss on sleep apnea and the approaches that may be successful.    Being overweight does not necessarily mean you will have health consequences.  Those who have BMI over 35 or over 27 with existing medical conditions carries greater risk.   Weight loss decreases severity of sleep apnea in most people with obesity. For those with mild obesity who have developed snoring with weight gain, even 15-30 pound weight loss can improve and occasionally eliminate sleep apnea.  Structured and life-long dietary and health habits are necessary to lose weight and keep healthier weight levels.     Though there may be significant health benefits from weight loss, long-term weight loss is very difficult to achieve- studies show success with dietary management in less than 10% of people. In addition, substantial weight loss may require years of dietary control and may be difficult if patients have severe obesity. In these cases, surgical management may be considered.  Finally, older individuals who have tolerated obesity without health complications may be less likely to benefit from weight loss strategies.        Your BMI is Body mass index is 24.53 kg/m .  Weight management is a personal decision.  If you are interested in exploring weight loss strategies, the following discussion covers the approaches that may be successful. Body mass index (BMI) is one way to tell whether you are at a healthy weight, overweight, or obese. It measures your weight in relation to your height.  A BMI of 18.5 to 24.9 is in the healthy range. A person with a BMI of 25 to 29.9 is considered overweight, and someone with a BMI of 30 or greater is considered obese. More than two-thirds of American adults are considered overweight or obese.  Being overweight or  obese increases the risk for further weight gain. Excess weight may lead to heart disease and diabetes.  Creating and following plans for healthy eating and physical activity may help you improve your health.  Weight control is part of healthy lifestyle and includes exercise, emotional health, and healthy eating habits. Careful eating habits lifelong are the mainstay of weight control. Though there are significant health benefits from weight loss, long-term weight loss with diet alone may be very difficult to achieve- studies show long-term success with dietary management in less than 10% of people. Attaining a healthy weight may be especially difficult to achieve in those with severe obesity. In some cases, medications, devices and surgical management might be considered.  What can you do?  If you are overweight or obese and are interested in methods for weight loss, you should discuss this with your provider.     Consider reducing daily calorie intake by 500 calories.     Keep a food journal.     Avoiding skipping meals, consider cutting portions instead.    Diet combined with exercise helps maintain muscle while optimizing fat loss. Strength training is particularly important for building and maintaining muscle mass. Exercise helps reduce stress, increase energy, and improves fitness. Increasing exercise without diet control, however, may not burn enough calories to loose weight.       Start walking three days a week 10-20 minutes at a time    Work towards walking thirty minutes five days a week     Eventually, increase the speed of your walking for 1-2 minutes at time    In addition, we recommend that you review healthy lifestyles and methods for weight loss available through the National Institutes of Health patient information sites:  http://win.niddk.nih.gov/publications/index.htm    And look into health and wellness programs that may be available through your health insurance provider, employer, local  [facility], or [facility].          Surgery:    Surgery for obstructive sleep apnea is considered generally only when other therapies fail to work. Surgery may be discussed with you if you are having a difficult time tolerating CPAP and or when there is an abnormal structure that requires surgical correction.  Nose and throat surgeries often enlarge the airway to prevent collapse.  Most of these surgeries create pain for 1-2 weeks and up to half of the most common surgeries are not effective throughout life.  You should carefully discuss the benefits and drawbacks to surgery with your sleep provider and surgeon to determine if it is the best solution for you.   More information  Surgery for GONZALO is directed at areas that are responsible for narrowing or complete obstruction of the airway during sleep.  There are a wide range of procedures available to enlarge and/or stabilize the airway to prevent blockage of breathing in the three major areas where it can occur: the palate, tongue, and nasal regions.  Successful surgical treatment depends on the accurate identification of the factors responsible for obstructive sleep apnea in each person.  A personalized approach is required because there is no single treatment that works well for everyone.  Because of anatomic variation, consultation with an examination by a sleep surgeon is a critical first step in determining what surgical options are best for each patient.  In some cases, examination during sedation may be recommended in order to guide the selection of procedures.  Patients will be counseled about risks and benefits as well as the typical recovery course after surgery. Surgery is typically not a cure for a person s GONZALO.  However, surgery will often significantly improve one s GONZALO severity (termed  success rate ).  Even in the absence of a cure, surgery will decrease the cardiovascular risk associated with OSA7; improve overall quality of life8 (sleepiness,  functionality, sleep quality, etc).      Palate Procedures:  Patients with GONZALO often have narrowing of their airway in the region of their tonsils and uvula.  The goals of palate procedures are to widen the airway in this region as well as to help the tissues resist collapse.  Modern palate procedure techniques focus on tissue conservation and soft tissue rearrangement, rather than tissue removal.  Often the uvula is preserved in this procedure. Residual sleep apnea is common in patient after pharyngoplasty with an average reduction in sleep apnea events of 33%2.      Tongue Procedures:  ExamWhile patients are awake, the muscles that surround the throat are active and keep this region open for breathing. These muscles relax during sleep, allowing the tongue and other structures to collapse and block breathing.  There are several different tongue procedures available.  Selection of a tongue base procedure depends on characteristics seen on physical exam.  Generally, procedures are aimed at removing bulky tissues in this area or preventing the back of the tongue from falling back during sleep.  Success rates for tongue surgery range from 50-62%3.    Hypoglossal Nerve Stimulation:  Hypoglossal nerve stimulation has recently received approval from the [place] Food and Drug Administration for the treatment of obstructive sleep apnea.  This is based on research showing that the system was safe and effective in treating sleep apnea6.  Results showed that the median AHI score decreased 68%, from 29.3 to 9.0. This therapy uses an implant system that senses breathing patterns and delivers mild stimulation to airway muscles, which keeps the airway open during sleep.  The system consists of three fully implanted components: a small generator (similar in size to a pacemaker), a breathing sensor, and a stimulation lead.  Using a small handheld remote, a patient turns the therapy on before bed and off upon awakening.     Candidates for this device must be greater than 22 years of age, have moderate to severe GONZALO (AHI between 20-65), BMI less than 32, have tried CPAP/oral appliance without success, and have appropriate upper airway anatomy (determined by a sleep endoscopy performed by Dr. Edmond).    Hypoglossal Nerve Stimulation Pathway:    The sleep surgeon s office will work with the patient through the insurance prior-authorization process (including communications and appeals).    Nasal Procedures:  Nasal obstruction can interfere with nasal breathing during the day and night.  Studies have shown that relief of nasal obstruction can improve the ability of some patients to tolerate positive airway pressure therapy for obstructive sleep apnea1.  Treatment options include medications such as nasal saline, topical corticosteroid and antihistamine sprays, and oral medications such as antihistamines or decongestants. Non-surgical treatments can include external nasal dilators for selected patients. If these are not successful by themselves, surgery can improve the nasal airway either alone or in combination with these other options.      Combination Procedures:  Combination of surgical procedures and other treatments may be recommended, particularly if patients have more than one area of narrowing or persistent positional disease.  The success rate of combination surgery ranges from 66-80%2,3.    References  8. Sangeeta WISE. The Role of the Nose in Snoring and Obstructive Sleep Apnoea: An Update.  Eur Arch Otorhinolaryngol. 2011; 268: 1365-73.  9.  Gordy SM; Chanelle JA; Ubaldo JR; Pallanch JF; Sanjuana MB; Lavelle SG; Iva PHILLIPSD. Surgical modifications of the upper airway for obstructive sleep apnea in adults: a systematic review and meta-analysis. SLEEP 2010;33(10):0848-5864. Edson FULLER. Hypopharyngeal surgery in obstructive sleep apnea: an evidence-based medicine review.  Arch Otolaryngol Head Neck Surg. 2006 Feb;132(2):206-13.  10. Iam  YH1, Mela Y, Abebe RAYA. The efficacy of anatomically based multilevel surgery for obstructive sleep apnea. Otolaryngol Head Neck Surg. 2003 Oct;129(4):327-35.  11. Kezirian E, Goldberg A. Hypopharyngeal Surgery in Obstructive Sleep Apnea: An Evidence-Based Medicine Review. Arch Otolaryngol Head Neck Surg. 2006 Feb;132(2):206-13.  12. Kadeem CORRIGAN et al. Upper-Airway Stimulation for Obstructive Sleep Apnea.  N Engl J Med. 2014 Jan 9;370(2):139-49.  13. Morro Y et al. Increased Incidence of Cardiovascular Disease in Middle-aged Men with Obstructive Sleep Apnea. Am J Respir Crit Care Med; 2002 166: 159-165  14. Rivera GUTIERREZ et al. Studying Life Effects and Effectiveness of Palatopharyngoplasty (SLEEP) study: Subjective Outcomes of Isolated Uvulopalatopharyngoplasty. Otolaryngol Head Neck Surg. 2011; 144: 623-631.              Your BMI is Body mass index is 24.53 kg/m .  Weight management is a personal decision.  If you are interested in exploring weight loss strategies, the following discussion covers the approaches that may be successful. Body mass index (BMI) is one way to tell whether you are at a healthy weight, overweight, or obese. It measures your weight in relation to your height.  A BMI of 18.5 to 24.9 is in the healthy range. A person with a BMI of 25 to 29.9 is considered overweight, and someone with a BMI of 30 or greater is considered obese. More than two-thirds of American adults are considered overweight or obese.  Being overweight or obese increases the risk for further weight gain. Excess weight may lead to heart disease and diabetes.  Creating and following plans for healthy eating and physical activity may help you improve your health.  Weight control is part of healthy lifestyle and includes exercise, emotional health, and healthy eating habits. Careful eating habits lifelong are the mainstay of weight control. Though there are significant health benefits from weight loss, long-term weight loss with  diet alone may be very difficult to achieve- studies show long-term success with dietary management in less than 10% of people. Attaining a healthy weight may be especially difficult to achieve in those with severe obesity. In some cases, medications, devices and surgical management might be considered.  What can you do?  If you are overweight or obese and are interested in methods for weight loss, you should discuss this with your provider.     Consider reducing daily calorie intake by 500 calories.     Keep a food journal.     Avoiding skipping meals, consider cutting portions instead.    Diet combined with exercise helps maintain muscle while optimizing fat loss. Strength training is particularly important for building and maintaining muscle mass. Exercise helps reduce stress, increase energy, and improves fitness. Increasing exercise without diet control, however, may not burn enough calories to loose weight.       Start walking three days a week 10-20 minutes at a time    Work towards walking thirty minutes five days a week     Eventually, increase the speed of your walking for 1-2 minutes at time    In addition, we recommend that you review healthy lifestyles and methods for weight loss available through the National Institutes of Health patient information sites:  http://win.niddk.nih.gov/publications/index.htm    And look into health and wellness programs that may be available through your health insurance provider, employer, local community center, or [employer].      Your blood pressure was checked while you were in clinic today.  Please read the guidelines below about what these numbers mean and what you should do about them.  Your systolic blood pressure is the top number.  This is the pressure when the heart is pumping.  Your diastolic blood pressure is the bottom number.  This is the pressure in between beats.  If your systolic blood pressure is less than 120 and your diastolic blood pressure is less  than 80, then your blood pressure is normal. There is nothing more that you need to do about it  If your systolic blood pressure is 120-139 or your diastolic blood pressure is 80-89, your blood pressure may be higher than it should be.  You should have your blood pressure re-checked within a year by a primary care provider.  If your systolic blood pressure is 140 or greater or your diastolic blood pressure is 90 or greater, you may have high blood pressure.  High blood pressure is treatable, but if left untreated over time it can put you at risk for heart attack, stroke, or kidney failure.  You should have your blood pressure re-checked by a primary care provider within the next four weeks.

## 2019-04-22 ENCOUNTER — ANTICOAGULATION THERAPY VISIT (OUTPATIENT)
Dept: NURSING | Facility: CLINIC | Age: 64
End: 2019-04-22
Payer: COMMERCIAL

## 2019-04-22 DIAGNOSIS — I26.99 OTHER PULMONARY EMBOLISM WITHOUT ACUTE COR PULMONALE, UNSPECIFIED CHRONICITY (H): ICD-10-CM

## 2019-04-22 LAB — INR POINT OF CARE: 2.7 (ref 0.86–1.14)

## 2019-04-22 PROCEDURE — 36416 COLLJ CAPILLARY BLOOD SPEC: CPT

## 2019-04-22 PROCEDURE — 99207 ZZC NO CHARGE NURSE ONLY: CPT

## 2019-04-22 PROCEDURE — 85610 PROTHROMBIN TIME: CPT | Mod: QW

## 2019-04-22 NOTE — PROGRESS NOTES
ANTICOAGULATION FOLLOW-UP CLINIC VISIT    Patient Name:  Harley Alvarado  Date:  2019  Contact Type:  Face to Face    SUBJECTIVE:     Patient Findings     Comments:   The patient was assessed for diet, medication, and activity level changes, missed or extra doses, bruising or bleeding, with no problem findings.             OBJECTIVE    INR Protime   Date Value Ref Range Status   2019 2.7 (A) 0.86 - 1.14 Final       ASSESSMENT / PLAN  INR assessment THER    Recheck INR In: 4 WEEKS    INR Location Clinic      Anticoagulation Summary  As of 2019    INR goal:   2.0-3.0   TTR:   68.4 % (1.5 y)   INR used for dosin.7 (2019)   Warfarin maintenance plan:   7.5 mg (5 mg x 1.5) every day   Full warfarin instructions:   7.5 mg every day   Weekly warfarin total:   52.5 mg   No change documented:   Azul Garber RN   Plan last modified:   Azul Garber RN (2019)   Next INR check:   2019   Target end date:   Indefinite    Indications    Long-term (current) use of anticoagulants [Z79.01] [Z79.01]  Pulmonary embolism (H) [I26.99]             Anticoagulation Episode Summary     INR check location:       Preferred lab:       Send INR reminders to:   [facility]    Comments:         Anticoagulation Care Providers     Provider Role Specialty Phone number    Cory Acosta PA-C Responsible Physician Assistant [phone]            See the Encounter Report to view Anticoagulation Flowsheet and Dosing Calendar (Go to Encounters tab in chart review, and find the Anticoagulation Therapy Visit)        Azul Garber RN

## 2019-04-29 ENCOUNTER — THERAPY VISIT (OUTPATIENT)
Dept: SLEEP MEDICINE | Facility: CLINIC | Age: 64
End: 2019-04-29
Payer: COMMERCIAL

## 2019-04-29 DIAGNOSIS — G47.33 OSA (OBSTRUCTIVE SLEEP APNEA): ICD-10-CM

## 2019-04-29 PROCEDURE — 95810 POLYSOM 6/> YRS 4/> PARAM: CPT | Performed by: INTERNAL MEDICINE

## 2019-05-02 LAB — SLPCOMP: NORMAL

## 2019-05-24 ENCOUNTER — ANTICOAGULATION THERAPY VISIT (OUTPATIENT)
Dept: NURSING | Facility: CLINIC | Age: 64
End: 2019-05-24
Payer: COMMERCIAL

## 2019-05-24 DIAGNOSIS — I26.99 OTHER PULMONARY EMBOLISM WITHOUT ACUTE COR PULMONALE, UNSPECIFIED CHRONICITY (H): ICD-10-CM

## 2019-05-24 LAB — INR POINT OF CARE: 1.9 (ref 0.86–1.14)

## 2019-05-24 PROCEDURE — 99207 ZZC NO CHARGE NURSE ONLY: CPT

## 2019-05-24 PROCEDURE — 85610 PROTHROMBIN TIME: CPT | Mod: QW

## 2019-05-24 PROCEDURE — 36416 COLLJ CAPILLARY BLOOD SPEC: CPT

## 2019-05-24 NOTE — PROGRESS NOTES
ANTICOAGULATION FOLLOW-UP CLINIC VISIT    Patient Name:  Harley Alvarado  Date:  2019  Contact Type:  Face to Face    SUBJECTIVE:  Patient Findings     Comments:   Patient denies any identifiable changes that caused the slightly sub-therapeutic INR.   Patient states he will be out of town in 2 weeks so next INR will be in 3 weeks.        Clinical Outcomes     Negatives:   Major bleeding event, Thromboembolic event, Anticoagulation-related hospital admission, Anticoagulation-related ED visit, Anticoagulation-related fatality    Comments:   Patient denies any identifiable changes that caused the slightly sub-therapeutic INR.   Patient states he will be out of town in 2 weeks so next INR will be in 3 weeks.           OBJECTIVE    INR Protime   Date Value Ref Range Status   2019 1.9 (A) 0.86 - 1.14 Final       ASSESSMENT / PLAN  INR assessment THER    Recheck INR In: 3 WEEKS    INR Location Clinic      Anticoagulation Summary  As of 2019    INR goal:   2.0-3.0   TTR:   69.5 % (1.6 y)   INR used for dosin.9! (2019)   Warfarin maintenance plan:   7.5 mg (5 mg x 1.5) every day   Full warfarin instructions:   : 10 mg; Otherwise 7.5 mg every day   Weekly warfarin total:   52.5 mg   Plan last modified:   Azul Garber RN (2019)   Next INR check:      Target end date:   Indefinite    Indications    Long-term (current) use of anticoagulants [Z79.01] [Z79.01]  Pulmonary embolism (H) [I26.99]             Anticoagulation Episode Summary     INR check location:       Preferred lab:       Send INR reminders to:   [facility]    Comments:         Anticoagulation Care Providers     Provider Role Specialty Phone number    Cory Acosta PA-C Responsible Physician Assistant [phone]            See the Encounter Report to view Anticoagulation Flowsheet and Dosing Calendar (Go to Encounters tab in chart review, and find the Anticoagulation Therapy Visit)        Azul Garber RN

## 2019-06-14 ENCOUNTER — OFFICE VISIT (OUTPATIENT)
Dept: SLEEP MEDICINE | Facility: CLINIC | Age: 64
End: 2019-06-14
Payer: COMMERCIAL

## 2019-06-14 VITALS
SYSTOLIC BLOOD PRESSURE: 99 MMHG | OXYGEN SATURATION: 97 % | BODY MASS INDEX: 24.43 KG/M2 | HEART RATE: 68 BPM | DIASTOLIC BLOOD PRESSURE: 69 MMHG | HEIGHT: 66 IN | WEIGHT: 152 LBS

## 2019-06-14 DIAGNOSIS — G47.33 OSA (OBSTRUCTIVE SLEEP APNEA): Primary | ICD-10-CM

## 2019-06-14 DIAGNOSIS — F51.04 PSYCHOPHYSIOLOGICAL INSOMNIA: ICD-10-CM

## 2019-06-14 PROCEDURE — 99214 OFFICE O/P EST MOD 30 MIN: CPT | Performed by: INTERNAL MEDICINE

## 2019-06-14 ASSESSMENT — MIFFLIN-ST. JEOR: SCORE: 1421.97

## 2019-06-14 NOTE — PROGRESS NOTES
Sleep clinic follow up visit Note:    Date on this visit: 6/14/2019    Chief complaint Review PSG results     Harley Alvarado 63 year old male with insomnia, GERD, depression, pulmonary embolism. A diagnostic polysomnogram was performed on 4/29/2019, to evaluate for clinical symptoms of sleep apnea. He presents to sleep clinic today to review the  PSG  results.    Polysomnogram Results:   Sleep Architecture: All sleep stages present without sleep disruption. Sleep delayed until midnight with short sleep duration.   The total recording time of the polysomnogram was 422.2 minutes. The total sleep time was brief 376.5 minutes and consolidated sleep was delayed until nearly midnight.. Sleep latency was normal at 18.0 minutes with the use of zolpidem. REM latency was 40.0 minutes. Arousal index was normal at 14.0 arousals per hour. Sleep efficiency was normal at 89.2%. Wake after sleep onset was 20.0 minutes. The patient spent 2.5% of total sleep time in Stage N1, 57.2% in Stage N2, 21.9% in Stage N3, and 18.3% in REM. Time in REM supine was 5.5 minutes.     Respiration: Snoring with mild predominantly supine obstructive sleep apnea and some transitional central apneas.     Events ? The polysomnogram revealed a presence of 12 obstructive, 10 central, and - mixed apneas resulting in an apnea index of 3.5 events per hour. There were 37 obstructive hypopneas and - central hypopneas resulting in an obstructive hypopnea index of 5.9 and central hypopnea index of - events per hour. The combined apnea/hypopnea index was 9.4 events per hour (central apnea/hypopnea index was 1.6 events per hour). The REM AHI was 7.0 events per hour. The supine AHI was 23.1 events per hour. The RERA index was 3.7 events per hour. The RDI was 13.1 events per hour.     Snoring - was reported as moderate.     Respiratory rate and pattern - was notable for normal respiratory rate and pattern.     Sustained Sleep Associated Hypoventilation -  Transcutaneous carbon dioxide monitoring was not used, however significant hypoventilation was not suggested by oximetry.     Sleep Associated Hypoxemia - (Greater than 5 minutes O2 sat at or below 88%) was not present. Baseline oxygen saturation was 94.3%. Lowest oxygen saturation was 82.0%. Time spent less than or equal to 88% was 1.6 minutes. Time spent less than or equal to 89% was 2.0 minutes.     Movement Activity: No abnormal sleep movements.     Periodic Limb Activity - There were 115 PLMs during the entire study. The PLM index was 18.3 movements per hour. The PLM Arousal Index was 0.6 per hour.     REM EMG Activity - Excessive transient/sustained muscle activity was not present.     Nocturnal Behavior - Abnormal sleep related behaviors were not noted.     Bruxism - None apparent.     Cardiac Summary: Normal sinus rhythm.   The average pulse rate was 55.1 bpm. The minimum pulse rate was 49.9 bpm while the maximum pulse rate was 78.2 bpm. Arrhythmias were not noted.       Test results were discussed with patient in detail.       Allergies:    No Known Allergies    Medications:    Current Outpatient Medications   Medication Sig Dispense Refill     cholecalciferol (VITAMIN D3) 1000 units (25 mcg) capsule Take 1 capsule by mouth daily       lisinopril (PRINIVIL/ZESTRIL) 20 MG tablet TAKE 1 TABLET(20 MG) BY MOUTH DAILY 90 tablet 2     pravastatin (PRAVACHOL) 40 MG tablet TAKE 1 TABLET BY MOUTH EVERY NIGHT AT BEDTIME (Patient not taking: Reported on 6/14/2019) 90 tablet 2     warfarin (COUMADIN) 5 MG tablet Take 7.5mg everyday OR as instructed by INR clinic (Patient not taking: Reported on 6/14/2019) 126 tablet 1       Problem List:  Patient Active Problem List    Diagnosis Date Noted     Chronic right shoulder pain 04/23/2018     Priority: Medium     History of dysplastic nevus 11/03/2017     Priority: Medium     Long-term (current) use of anticoagulants [Z79.01] 10/10/2017     Priority: Medium     Chronic  anticoagulation 10/06/2017     Priority: Medium     Dysplastic nevi 10/05/2017     Priority: Medium     Dyslipidemia 09/29/2017     Priority: Medium     Overview:   Created by Conversion       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     Priority: Medium     Overview:   Created by Conversion       Major depression, recurrent (H) 09/29/2017     Priority: Medium     Overview:   Created by Conversion    Replacement Utility updated for latest IMO load       Membranous glomerulonephritis 09/29/2017     Priority: Medium     Overview:   Created by Conversion  [facility] Annotation: Aug 26 2011  5:18PM - Angely Pat: Stage 1, s/p biopsy   8/2011    Replacement Utility updated for latest IMO load       Benign essential hypertension 09/29/2017     Priority: Medium     GERD (gastroesophageal reflux disease) 02/06/2016     Priority: Medium     Allergic conjunctivitis 04/13/2015     Priority: Medium     Nephropathy 12/30/2014     Priority: Medium     Pulmonary embolism (H) 12/30/2014     Priority: Medium        Past Medical/Surgical History:  Past Medical History:   Diagnosis Date     Allergic conjunctivitis 4/13/2015     Benign essential hypertension 9/29/2017     Dyslipidemia 9/29/2017    Overview:  Created by Conversion     GERD (gastroesophageal reflux disease) 2/6/2016     Latent tuberculosis 9/29/2017    Overview:  Created by Conversion     Major depression, recurrent (H) 9/29/2017    Overview:  Created by Conversion  Replacement Utility updated for latest IMO load     Membranous glomerulonephritis 9/29/2017    Overview:  Created by Conversion [facility] Annotation: Aug 26 2011  5:18PM - Angely Pat: Stage 1, s/p biopsy  8/2011  Replacement Utility updated for latest IMO load     Nephropathy 12/30/2014     Pulmonary embolism (H) 12/30/2014     Past Surgical History:   Procedure Laterality Date     HERNIA REPAIR  2015       Social History:  Social History     Socioeconomic History     Marital status:      Spouse name: Not on  "file     Number of children: Not on file     Years of education: Not on file     Highest education level: Not on file   Occupational History     Not on file   Social Needs     Financial resource strain: Not on file     Food insecurity:     Worry: Not on file     Inability: Not on file     Transportation needs:     Medical: Not on file     Non-medical: Not on file   Tobacco Use     Smoking status: Former Smoker     Smokeless tobacco: Never Used   Substance and Sexual Activity     Alcohol use: No     Drug use: No     Sexual activity: Yes     Partners: Female   Lifestyle     Physical activity:     Days per week: Not on file     Minutes per session: Not on file     Stress: Not on file   Relationships     Social connections:     Talks on phone: Not on file     Gets together: Not on file     Attends [employer] service: Not on file     Active member of club or organization: Not on file     Attends meetings of clubs or organizations: Not on file     Relationship status: Not on file     Intimate partner violence:     Fear of current or ex partner: Not on file     Emotionally abused: Not on file     Physically abused: Not on file     Forced sexual activity: Not on file   Other Topics Concern     Parent/sibling w/ CABG, MI or angioplasty before 65F 55M? Not Asked   Social History Narrative     Not on file         Examination:  Vitals: BP 99/69   Pulse 68   Ht 1.676 m (5' 5.98\")   Wt 68.9 kg (152 lb)   SpO2 97%   BMI 24.55 kg/m    BMI= Body mass index is 24.55 kg/m .  General appearance:  in no apparent distress  Pt is dressed casually, cooperative with good eye contact.   Speech is spontaneous with regular rate and volume.   Mood: euthymic; affect congruent with full range and intensity.   Sensorium: awake, alert and oriented to person, place, time, and situation.      Impression/Report/Plan:  1. Mild position dependent GONZALO: We discussed the various treatment options for GONZALO and pt was interested in auto CPAP device. " "Prescription was provided for Auto CPAP device 5-15 cm water. He was recommended to start using the device regularly during sleep and asked to get back to us if he encounters any interface problems. Pt will be followed through Sleep therapy management program.    2. Chronic psychophysiological insomnia: We discussed stimulus control measures. Strongly encouraged to follow good sleep hygiene/behavioral techniques. He would be a good candidate for Cognitive Behavioral Therapy, referral has been placed. Patient will bring sleep logs to the CBT-I appointment.     Encouraged healthy diet, and exercise.    Patient was strongly advised to avoid driving, operating any heavy machinery or other hazardous situations while drowsy or sleepy.  Patient was counseled on the importance of driving while alert, to pull over if drowsy, or nap before getting into the vehicle if sleepy.        He will follow up at sleep clinic  in approximately 6-8 weeks after starting CPAP to review compliance measures.    The above note was dictated using voice recognition software. Although reviewed after completion, some word and grammatical error may remain . Please contact the author for any clarifications.    \"I spent a total of 25 minutes face to face with Harley Alvarado during today's office visit. Over 50% of this time was spent counseling the patient and  coordinating care regarding GONZALO, CPAP, insomnia, CBT-i.\"       Camille Jung MD   of Medicine,  Division of Pulmonary/Sleep Medicine  [facility].                    "

## 2019-06-14 NOTE — PATIENT INSTRUCTIONS
Your blood pressure was checked while you were in clinic today.  Please read the guidelines below about what these numbers mean and what you should do about them.  Your systolic blood pressure is the top number.  This is the pressure when the heart is pumping.  Your diastolic blood pressure is the bottom number.  This is the pressure in between beats.  If your systolic blood pressure is less than 120 and your diastolic blood pressure is less than 80, then your blood pressure is normal. There is nothing more that you need to do about it  If your systolic blood pressure is 120-139 or your diastolic blood pressure is 80-89, your blood pressure may be higher than it should be.  You should have your blood pressure re-checked within a year by a primary care provider.  If your systolic blood pressure is 140 or greater or your diastolic blood pressure is 90 or greater, you may have high blood pressure.  High blood pressure is treatable, but if left untreated over time it can put you at risk for heart attack, stroke, or kidney failure.  You should have your blood pressure re-checked by a primary care provider within the next four weeks.  Your BMI is There is no height or weight on file to calculate BMI.  Weight management is a personal decision.  If you are interested in exploring weight loss strategies, the following discussion covers the approaches that may be successful. Body mass index (BMI) is one way to tell whether you are at a healthy weight, overweight, or obese. It measures your weight in relation to your height.  A BMI of 18.5 to 24.9 is in the healthy range. A person with a BMI of 25 to 29.9 is considered overweight, and someone with a BMI of 30 or greater is considered obese. More than two-thirds of American adults are considered overweight or obese.  Being overweight or obese increases the risk for further weight gain. Excess weight may lead to heart disease and diabetes.  Creating and following plans for  healthy eating and physical activity may help you improve your health.  Weight control is part of healthy lifestyle and includes exercise, emotional health, and healthy eating habits. Careful eating habits lifelong are the mainstay of weight control. Though there are significant health benefits from weight loss, long-term weight loss with diet alone may be very difficult to achieve- studies show long-term success with dietary management in less than 10% of people. Attaining a healthy weight may be especially difficult to achieve in those with severe obesity. In some cases, medications, devices and surgical management might be considered.  What can you do?  If you are overweight or obese and are interested in methods for weight loss, you should discuss this with your provider.     Consider reducing daily calorie intake by 500 calories.     Keep a food journal.     Avoiding skipping meals, consider cutting portions instead.    Diet combined with exercise helps maintain muscle while optimizing fat loss. Strength training is particularly important for building and maintaining muscle mass. Exercise helps reduce stress, increase energy, and improves fitness. Increasing exercise without diet control, however, may not burn enough calories to loose weight.       Start walking three days a week 10-20 minutes at a time    Work towards walking thirty minutes five days a week     Eventually, increase the speed of your walking for 1-2 minutes at time    In addition, we recommend that you review healthy lifestyles and methods for weight loss available through the National Institutes of Health patient information sites:  http://win.niddk.nih.gov/publications/index.htm    And look into health and wellness programs that may be available through your health insurance provider, employer, local community center, or dominik club.      [facility]omnia [facility]      Treating Insomnia  Good sleeping habits are a key part of treatment. If  needed, some medications may help you sleep better at first. Making healthy lifestyle changes and learning to relax can improve your sleep. Treating insomnia takes commitment, but trust that your efforts will pay off. Talk to your doctor before taking any medication.    Healthy Lifestyle  Your lifestyle affects your health and your sleep. Here are some healthy habits:    Keep a regular sleep schedule. Go to bed and get up at the same time each day.    Exercise regularly. It may help you reduce stress. Avoid strenuous exercise for two to four hours before bedtime.    Avoid or limit naps.    Use your bed only for sleep and sex.    Don t spend too much time in bed trying to fall asleep. If you can t fall asleep, get up and do something until you become tired and drowsy.    Avoid or limit caffeine and nicotine. They can keep you awake at night. Also avoid alcohol. It may help you fall asleep at first, but your sleep will not be restful.    Before Bedtime  To sleep better every night, try these tips:    Have a bedtime routine to let your body and mind know when it s time to sleep.    Going to bed should be relaxing so try to do only relaxing things around bedtime. Sleep will come sooner.    If your worries don t let you sleep, write them down in a diary. Then close it, and go to bed.    Make sure the room is not too hot or too cold. If it s not dark enough, an eye mask can help. If it s noisy, try using earplugs.    Learn to Relax  Stress, anxiety, and body tension may keep you awake at night. To unwind before bedtime, try reading a book, meditation, or yoga. Also, try the following:    Deep breathing. Sit or lie back in a chair. Take a slow, deep breath. Hold it for 5 counts. Then breathe out slowly through your mouth. Keep doing this until you feel relaxed.    Imagery. Think of the last fun trip you took. In your mind, walk through the trip from start to finish. Put as much detail into the memory as you can remember.  It will help you relax.    Cognitive Behavioral Treatment (CBT)  CBT is the most effective treatment for long-term insomnia. It tries to address the underlying causes of your sleep problems, including your habits and how you think about sleep.      Individual Therapy   Rob Peña    Sleep Psychologist    Online Programs     www.SHUTi.me (pronounced shut eye). There is a fee for this program. Enter the code  [facility]  if you decide to enroll in this program.      www.[facility].com (pronounced sleep ee oh). There is a fee for this program. Enter the code  [facility]  if you decide to enroll in this program.     Suggested Resources  Insomnia Treatment Books     Overcoming Insomnia by Hu Martinez and Sandra Arana (2008)    No More Sleepless Nights by Vahid Shahid and Marichuy Carpenter (1996)    Say Eloy to Insomnia by Saurav Cook (2009)    The Insomnia Workbook by Stephanie Irving and Sal Wiley (2009)    The Insomnia Answer by Cuauhtemoc Rodriguez and Corwin Ritter (2006)      Stress Management and Relaxation Books    The Relaxation and Stress Reduction Workbook by nIdia Rose, Kate Orta and Robbin Whitfield (2008)    Stress Management Workbook: Techniques and Self-Assessment Procedures by Shelby Flores and Jose Stock (1997)    A Mindfulness-Based Stress Reduction Workbook by Quan Valadez and Justyna Tejada (2010)    The Complete Stress Management Workbook by Carlos England, Juan Hernandez and Wayne Woodard (1996)    Assert Yourself by Sridevi Clark and Heber Clark (1977)    Relaxation Resources for Computer Download   These websites offer resources to help you relax. This list is for information only. [facility] is not responsible for the quality of services or the actions of any person or organization.  Progressive Muscle Relaxation (PMR):     http://www.MapMyIndiao.com/progressive-muscle-relaxation-exercise.html      http://studentsupport.[facility]/counseling/resources/self-help/relaxation-and-stress-management/   Deep Breathing Exercises:    http://www.FunCaptcha/breathing-awareness.html     Meditation:     www.[facility] University[facility]Mulu    www.DustcloudguidedNeironmeditation-site.disco volante You may have to pay for some of these resources.    Guided Imagery:    http://www.FunCaptcha/guided-imagery-scripts.html     http://Planet8/library/mkbqbdwcya-hrmfrs-zpjozmd/     Counseling / Behavioral Health  [facility]  Visit www.[email or web address].org or call [phone] to find a clinic close to you.      This is not a prescription and these resources are optional. You must pay for any costs when using these resources. Please ask your insurance carrier if you can be reimbursed for these resources. If so, you are responsible for sending the needed details to your insurance carrier. These resources may also be tax deductible as medical expenses. Check with your .     These programs and publications are not affiliated in any way with [facility].  How does cognitive behavioral therapy for insomnia work?  The cognitive side of cognitive behavioral therapy for insomnia teaches you to recognize and change beliefs that affect your ability to sleep. The behavioral part of cognitive behavioral therapy for insomnia helps you develop good sleep habits and avoid behaviors that keep you from sleeping well.   Cognitive behavioral therapy for insomnia contains one or more of the following elements:   Sleep education. To make effective changes, it's important to understand the basics of sleep -- for example, understanding sleep cycles and learning how beliefs, behaviors and outside factors can affect your sleep.   Cognitive control and psychotherapy. This type of therapy helps you control or eliminate negative thoughts and worries that keep you awake. It may also involve eliminating false or worrisome  beliefs about sleep, such as the idea that a single restless night will make you sick.   Sleep restriction. Lying in bed when you're awake can become a habit that leads to poor sleep. Limiting the amount of time you spend in bed can make you sleepier when you do go to bed. That way you're more likely to fall asleep and stay asleep.   Remaining passively awake. This involves avoiding any effort to fall asleep. Paradoxically, worrying that you can't sleep can actually keep you awake. Letting go of this worry can help you relax and make it easier to fall asleep.   Stimulus control therapy. This method helps remove factors that condition the mind to resist sleep. For example, you might be coached to use the bed only for sleep and sex, and to leave the bedroom if you can't go to sleep within 15 minutes.   Sleep hygiene. This method of therapy involves changing basic lifestyle habits that influence sleep, such as smoking or drinking too much caffeine late in the day, drinking too much alcohol, or not getting regular exercise. You may be told to avoid napping and taught to maintain a consistent sleep schedule. It also includes tips that help you sleep better, such as ways to wind down an hour or two before bedtime.   Relaxation training. This method helps you calm your mind and body. Approaches include meditation, hypnosis and muscle relaxation.   Biofeedback. This method allows you to observe biological signs such as heart rate and muscle tension. Your sleep specialist may have you take a biofeedback device home to record your daily patterns. This information can help identify patterns that affect sleep.   Sleep diary. To understand how to best treat your insomnia, your sleep therapist may have you keep a detailed sleep diary for one to two weeks. In the diary, you'll write down when you go to bed, when you get up, how much time you spend in bed unable to sleep, total sleep time and other details about your sleep  patterns.   The most effective treatment approach may combine several of these methods.   Cognitive behavioral therapy vs. pills  Some newer sleeping medications have been approved for long-term use. But they may not be the best long-term insomnia treatment.   Sleep medications can be a very effective short-term treatment -- for example, they can provide immediate relief during a period of high stress or grief.   Cognitive behavioral therapy for insomnia may be a good treatment choice if you have long-term sleep problems. You may want to try it if you're worried about becoming dependent on sleep medications, if medications aren't effective or if they cause bothersome side effects.   Unlike pills, cognitive behavioral therapy for insomnia addresses the underlying causes of insomnia rather than just relieving symptoms. But it takes time -- and effort -- to make it work. In some cases, a combination of sleep medication and cognitive behavioral therapy for insomnia may be the best approach.

## 2019-06-14 NOTE — NURSING NOTE
"Chief Complaint   Patient presents with     RECHECK       Initial BP 99/69   Pulse 68   Ht 1.676 m (5' 5.98\")   Wt 68.9 kg (152 lb)   SpO2 97%   BMI 24.55 kg/m   Estimated body mass index is 24.55 kg/m  as calculated from the following:    Height as of this encounter: 1.676 m (5' 5.98\").    Weight as of this encounter: 68.9 kg (152 lb).    Medication Reconciliation: complete        DME:no      "

## 2019-06-17 PROBLEM — Z79.01 LONG TERM CURRENT USE OF ANTICOAGULANT THERAPY: Status: ACTIVE | Noted: 2017-10-10

## 2019-06-20 ENCOUNTER — ANTICOAGULATION THERAPY VISIT (OUTPATIENT)
Dept: FAMILY MEDICINE | Facility: CLINIC | Age: 64
End: 2019-06-20

## 2019-06-20 DIAGNOSIS — Z79.01 LONG TERM CURRENT USE OF ANTICOAGULANT THERAPY: ICD-10-CM

## 2019-06-20 DIAGNOSIS — I26.99 OTHER PULMONARY EMBOLISM WITHOUT ACUTE COR PULMONALE, UNSPECIFIED CHRONICITY (H): ICD-10-CM

## 2019-07-02 ENCOUNTER — DOCUMENTATION ONLY (OUTPATIENT)
Dept: SLEEP MEDICINE | Facility: CLINIC | Age: 64
End: 2019-07-02
Payer: COMMERCIAL

## 2019-07-02 NOTE — PROGRESS NOTES
Patient was offered choice of vendor and chose [facility].  Patient Harley Alvarado was set up at [facility] on July 2, 2019. Patient received a Resmed AirSense 10 Auto. Pressures were set at 5-15 cm H2O.   Patient s ramp is 5 cm H2O for Auto and FLEX/EPR is EPR, 2.  Patient received a Resmed Mask name: N20  Nasal mask size Medium, heated tubing and heated humidifier.  Patient is enrolled in the STM Program and does not need to meet compliance. Patient has a follow up on 8/30/19 with Dr. Alcocer.    DANIEL BARAHONA

## 2019-07-08 ENCOUNTER — DOCUMENTATION ONLY (OUTPATIENT)
Dept: SLEEP MEDICINE | Facility: CLINIC | Age: 64
End: 2019-07-08

## 2019-07-08 NOTE — PROGRESS NOTES
3 DAY STM VISIT    Diagnostic AHI: 9.4   PSG    Patient contacted for 3 day STM visit  Subjective measures: pt feels things are going well.  No problems with pressure mask fit.      Device type: Auto-CPAP  PAP settings from order::  CPAP min 5 cm  H20       CPAP max 15 cm  H20     Mask type:    Nasal Mask     Device settings from machine      Min CPAP 5.0            Max CPAP 15.0             Assessment: Nightly usage over four hours.  Action plan: Pt to have f/u 14 day STM visit.  Patient has a follow up visit scheduled:   yes within 31-90 days of set up.    Total time spent on remote patient monitoring data analysis and patient contact today:   13 minutes

## 2019-07-17 ENCOUNTER — DOCUMENTATION ONLY (OUTPATIENT)
Dept: SLEEP MEDICINE | Facility: CLINIC | Age: 64
End: 2019-07-17
Payer: COMMERCIAL

## 2019-07-17 NOTE — PROGRESS NOTES
14  DAY STM VISIT    Diagnostic AHI: 9.4 PSG    Subjective measures:   Patient has no problem with wearing the mask or the pressure of the machine. Patient states sometimes the machine is to loud.     Assessment: Pt meeting objective benchmarks.  Patient meeting subjective benchmarks.     Action plan: Pt to have 30 day STM visit.      Device type: Auto-CPAP    PAP settings: CPAP min 5.0 cm  H20       CPAP max 15.0 cm  H20      95th% pressure 9.8 cm  H20     Mask type:  Nasal Mask    Objective measures: 14 day rolling measures      Compliance  78 %      Leak  16.89 lpm  last  upload      AHI 1.87   last  upload      Average number of minutes 316      Objective measure goal  Compliance   Goal >70%  Leak   Goal < 24 lpm  AHI  Goal < 5  Usage  Goal >240      Total time spent on remote patient monitoring data analysis and patient contact. today:   15 minutes

## 2019-08-02 ENCOUNTER — DOCUMENTATION ONLY (OUTPATIENT)
Dept: SLEEP MEDICINE | Facility: CLINIC | Age: 64
End: 2019-08-02

## 2019-08-02 NOTE — PROGRESS NOTES
30 DAY STM VISIT    Diagnostic AHI: 9.4   PSG    Subjective measures:   Pt reports that things are going ok.  He has some questions regarding cleaning.  Discussed frequency of cleaning.  He has some extra water that remains in tube.  Pt continues to struggle with insomnia and extended time in bed with napping during the day.  Discussed leaving bedroom and starting quiet activities outside of bedroom until he is ready to sleep again.  He has an appt already scheduled with sleep psychology in October     Assessment: Pt meeting objective benchmarks.  Patient meeting subjective benchmarks.   Action plan: pt to have 6 month [facility] visit  Patient has scheduled a follow up visit with Dr. Alcocer on 8/30/2019.   Device type: Auto-CPAP  PAP settings: CPAP min 5.0 cm  H20     CPAP max 15.0 cm  H20        95th% pressure 10.2 cm  H20   Mask type:  Nasal Mask  Objective measures: 14 day rolling measures      Compliance  78 %      Leak  16.03 lpm  last  upload      AHI 2.04   last  upload      Average number of minutes 260      Objective measure goal  Compliance   Goal >70%  Leak   Goal < 24 lpm  AHI  Goal < 5  Usage  Goal >240      Total time spent on remote patient monitoring data analysis and patient contact today:   25 minutes

## 2019-10-01 ENCOUNTER — HEALTH MAINTENANCE LETTER (OUTPATIENT)
Age: 64
End: 2019-10-01

## 2019-11-19 ENCOUNTER — TELEPHONE (OUTPATIENT)
Dept: FAMILY MEDICINE | Facility: CLINIC | Age: 64
End: 2019-11-19

## 2019-11-19 NOTE — TELEPHONE ENCOUNTER
Called to remind patient of upcoming appointment.     Unable to reach. Left voicemail. Advised patient to call clinic back at [phone].  Wayne Wyman, NABIL/Clinic Health Guide

## 2019-11-19 NOTE — PROGRESS NOTES
Pre-Visit Planning Subjective     Harley Alvarado is a 63 year old male who presents to clinic today for the following health issues:    History of Present Illness        Mental Health Follow-up:  Patient presents to follow-up on Depression.Patient's depression since last visit has been:  No change  The patient is not having other symptoms associated with depression.      Any significant life events: No  Patient is not feeling anxious or having panic attacks.  Patient has no concerns about alcohol or drug use.     Social History  Tobacco Use    Smoking status: Former Smoker    Smokeless tobacco: Never Used  Alcohol use: Yes    Comment: occasional  Drug use: No      Today's PHQ-9         PHQ-9 Total Score:     (P) 5   PHQ-9 Q9 Thoughts of better off dead/self-harm past 2 weeks :   (P) Not at all   Thoughts of suicide or self harm:      Self-harm Plan:        Self-harm Action:          Safety concerns for self or others:           He eats 4 or more servings of fruits and vegetables daily.He consumes 1 sweetened beverage(s) daily.  He is taking medications regularly.     Rash  Onset: a couple months ago    Description:   Location: back left shoulder, right shoulder  Character: raised  Itching (Pruritis): YES    Progression of Symptoms:  worsening    Accompanying Signs & Symptoms:  Fever: no   Body aches or joint pain: no   Sore throat symptoms: no   Recent cold symptoms: YES    History:   Previous similar rash: YES- couple years ago    Precipitating factors:   Exposure to similar rash: no   New exposures: None   Recent travel: no     Alleviating factors:  none    Therapies Tried and outcome: none    Cough for 1 day. Dry. Worse with talking. No shortness of breath. Had cold prior to this cough. States 7 years ago had similar symptoms and resolved with abx due to nasal polyp found by ENT. No current treatments tried.       Patient Active Problem List   Diagnosis     Allergic conjunctivitis     Dyslipidemia     GERD  (gastroesophageal reflux disease)     Latent tuberculosis     Major depression, recurrent (H)     Membranous glomerulonephritis     Nephropathy     Pulmonary embolism (H)     Benign essential hypertension     Dysplastic nevi     Chronic anticoagulation     Long-term (current) use of anticoagulants [Z79.01]     History of dysplastic nevus     Chronic right shoulder pain     CKD (chronic kidney disease) stage 3, GFR 30-59 ml/min (H)     Past Surgical History:   Procedure Laterality Date     HERNIA REPAIR  2015       Social History     Tobacco Use     Smoking status: Former Smoker     Smokeless tobacco: Former User     Quit date: 1974   Substance Use Topics     Alcohol use: Yes     Comment: occasional     Family History   Problem Relation Age of Onset     Alzheimer Disease Mother      Heart Disease Father          Current Outpatient Medications   Medication Sig Dispense Refill     lisinopril (PRINIVIL/ZESTRIL) 5 MG tablet Take 1 tablet (5 mg) by mouth daily 90 tablet      cholecalciferol (VITAMIN D3) 1000 units (25 mcg) capsule Take 1 capsule by mouth daily       No Known Allergies  Recent Labs   Lab Test 10/02/17  0759 09/12/17 06/09/14   LDL 82  --   --  80   HDL 44  --   --  46   TRIG 119  --   --  151   ALT 28  --   --   --    CR 1.38* 1.4*   < > 1.4*   GFRESTIMATED 52* 51*   < > 52*   GFRESTBLACK 63 >60   < > >60   POTASSIUM 4.2 4.7  4.3   < >  --     < > = values in this interval not displayed.      BP Readings from Last 3 Encounters:   11/22/19 117/75   06/14/19 99/69   04/12/19 119/79    Wt Readings from Last 3 Encounters:   11/22/19 70.3 kg (154 lb 14.4 oz)   06/14/19 68.9 kg (152 lb)   04/12/19 68.9 kg (152 lb)                    Reviewed and updated as needed this visit by Provider  Tobacco  Allergies  Meds  Problems  Med Hx  Surg Hx  Fam Hx         Review of Systems   ROS COMP: Constitutional, HEENT, cardiovascular, pulmonary, GI, , musculoskeletal, neuro, skin, endocrine and psych systems are  "negative, except as otherwise noted.      Objective    /75 (BP Location: Right arm, Patient Position: Sitting, Cuff Size: Adult Regular)   Pulse 70   Temp 97.9  F (36.6  C) (Oral)   Resp 18   Ht 1.664 m (5' 5.51\")   Wt 70.3 kg (154 lb 14.4 oz)   SpO2 96%   BMI 25.38 kg/m    Body mass index is 25.38 kg/m .  Physical Exam   GENERAL: healthy, alert and no distress  HENT: ear canals and TM's normal, nose and mouth without ulcers or lesions  RESP: lungs clear to auscultation - no rales, rhonchi or wheezes  CV: regular rates and rhythm  SKIN: right shoulder and left lateral abdomen: ~1 cm domed hyperkeratotic skin lesion without bleeding noted.   PSYCH: mentation appears normal, affect normal/bright    Diagnostic Test Results:  none         Assessment & Plan     (L98.9) Skin lesion  (primary encounter diagnosis)  Comment: history of dysplasia in the past. Concerning for possible SSC on exam. Will have see derm.   Plan: DERMATOLOGY REFERRAL            (R05) Cough  Comment: normal exam. Short course. Seeing patient in ~1 month for physical. Will reassess. Offered tessalon, but for now denied by patient. To call if needs this. If significant and given past occurrence, may be ACE induced. ARB may be option to change to if needed.   Plan:     (N18.3) CKD (chronic kidney disease) stage 3, GFR 30-59 ml/min (H)  Comment: stable. Followed by nephrology.   Plan:     (N05.2) Membranous glomerulonephritis  Comment: as above   Plan: lisinopril (PRINIVIL/ZESTRIL) 5 MG tablet            (I10) Benign essential hypertension  Comment: updated med dosing per nephrology  Plan: lisinopril (PRINIVIL/ZESTRIL) 5 MG tablet            (Z23) Need for prophylactic vaccination and inoculation against influenza  Comment:   Plan: INFLUENZA QUAD, RECOMBINANT, P-FREE (RIV4)         (FLUBLOCK) [05882], Vaccine Administration,         Initial [17088]            (Z12.11) Special screening for malignant neoplasms, colon  Comment:   Plan: " "GASTROENTEROLOGY ADULT REF PROCEDURE ONLY            (F33.42) Recurrent major depressive disorder, in full remission (H)  Comment: stable. phq 9 update in 1 month  Plan:      BMI:   Estimated body mass index is 25.38 kg/m  as calculated from the following:    Height as of this encounter: 1.664 m (5' 5.51\").    Weight as of this encounter: 70.3 kg (154 lb 14.4 oz).             Return in about 1 month (around 12/22/2019) for Physical Exam.    Cory Acosta PA-C  [facility] Appointments   Date Time Provider Department Center   11/22/2019 10:00 AM Cory Acosta PA-C [facility]   12/19/2019  9:30 AM Cory Acosta PA-C [facility]     Arrival Time for this Appointment:  9:40 AM   Appointment Notes for this encounter:   Skin anomaly    Questionnaires Reviewed/Assigned  No additional questionnaires are needed      Patient preferred phone number: [phone]    Unable to reach. Left voicemail. Advised patient to call clinic back at [phone].  Wayne Wyman, NABIL/Clinic Health Guide    Answers for HPI/ROS submitted by the patient on 11/22/2019   Chronic problems general questions HPI Form  If you checked off any problems, how difficult have these problems made it for you to do your work, take care of things at home, or get along with other people?: Somewhat difficult  PHQ9 TOTAL SCORE: 5  LIZZIE 7 TOTAL SCORE: 0    "

## 2019-11-22 ENCOUNTER — OFFICE VISIT (OUTPATIENT)
Dept: FAMILY MEDICINE | Facility: CLINIC | Age: 64
End: 2019-11-22
Payer: COMMERCIAL

## 2019-11-22 VITALS
HEART RATE: 70 BPM | TEMPERATURE: 97.9 F | SYSTOLIC BLOOD PRESSURE: 117 MMHG | WEIGHT: 154.9 LBS | RESPIRATION RATE: 18 BRPM | DIASTOLIC BLOOD PRESSURE: 75 MMHG | BODY MASS INDEX: 24.89 KG/M2 | OXYGEN SATURATION: 96 % | HEIGHT: 66 IN

## 2019-11-22 DIAGNOSIS — L98.9 SKIN LESION: Primary | ICD-10-CM

## 2019-11-22 DIAGNOSIS — Z23 NEED FOR PROPHYLACTIC VACCINATION AND INOCULATION AGAINST INFLUENZA: ICD-10-CM

## 2019-11-22 DIAGNOSIS — F33.42 RECURRENT MAJOR DEPRESSIVE DISORDER, IN FULL REMISSION (H): ICD-10-CM

## 2019-11-22 DIAGNOSIS — N05.2 MEMBRANOUS GLOMERULONEPHRITIS: ICD-10-CM

## 2019-11-22 DIAGNOSIS — I10 BENIGN ESSENTIAL HYPERTENSION: ICD-10-CM

## 2019-11-22 DIAGNOSIS — R05.9 COUGH: ICD-10-CM

## 2019-11-22 DIAGNOSIS — Z12.11 SPECIAL SCREENING FOR MALIGNANT NEOPLASMS, COLON: ICD-10-CM

## 2019-11-22 DIAGNOSIS — N18.30 CKD (CHRONIC KIDNEY DISEASE) STAGE 3, GFR 30-59 ML/MIN (H): ICD-10-CM

## 2019-11-22 PROCEDURE — 99214 OFFICE O/P EST MOD 30 MIN: CPT | Performed by: PHYSICIAN ASSISTANT

## 2019-11-22 RX ORDER — LISINOPRIL 5 MG/1
5 TABLET ORAL DAILY
Qty: 90 TABLET | COMMUNITY
Start: 2019-11-22

## 2019-11-22 ASSESSMENT — ANXIETY QUESTIONNAIRES
2. NOT BEING ABLE TO STOP OR CONTROL WORRYING: NOT AT ALL
5. BEING SO RESTLESS THAT IT IS HARD TO SIT STILL: NOT AT ALL
7. FEELING AFRAID AS IF SOMETHING AWFUL MIGHT HAPPEN: NOT AT ALL
GAD7 TOTAL SCORE: 0
GAD7 TOTAL SCORE: 0
1. FEELING NERVOUS, ANXIOUS, OR ON EDGE: NOT AT ALL
3. WORRYING TOO MUCH ABOUT DIFFERENT THINGS: NOT AT ALL
4. TROUBLE RELAXING: NOT AT ALL
7. FEELING AFRAID AS IF SOMETHING AWFUL MIGHT HAPPEN: NOT AT ALL
6. BECOMING EASILY ANNOYED OR IRRITABLE: NOT AT ALL
GAD7 TOTAL SCORE: 0

## 2019-11-22 ASSESSMENT — MIFFLIN-ST. JEOR: SCORE: 1427.62

## 2019-11-22 ASSESSMENT — PAIN SCALES - GENERAL: PAINLEVEL: NO PAIN (0)

## 2019-11-22 ASSESSMENT — PATIENT HEALTH QUESTIONNAIRE - PHQ9
SUM OF ALL RESPONSES TO PHQ QUESTIONS 1-9: 5
10. IF YOU CHECKED OFF ANY PROBLEMS, HOW DIFFICULT HAVE THESE PROBLEMS MADE IT FOR YOU TO DO YOUR WORK, TAKE CARE OF THINGS AT HOME, OR GET ALONG WITH OTHER PEOPLE: SOMEWHAT DIFFICULT
SUM OF ALL RESPONSES TO PHQ QUESTIONS 1-9: 5

## 2019-11-23 ASSESSMENT — ANXIETY QUESTIONNAIRES: GAD7 TOTAL SCORE: 0

## 2019-11-26 ENCOUNTER — TELEPHONE (OUTPATIENT)
Dept: FAMILY MEDICINE | Facility: CLINIC | Age: 64
End: 2019-11-26

## 2019-11-26 DIAGNOSIS — L98.9 SKIN LESION: Primary | ICD-10-CM

## 2019-11-26 NOTE — TELEPHONE ENCOUNTER
ILANAB, see pt request and advise [facility], route for processing  Heather Conway RN, BSN  Message handled by Nurse Triage.

## 2019-11-26 NOTE — TELEPHONE ENCOUNTER
Routed to TC, see below for [place] derm referral, need to fax  Heather Conway RN, BSN  Message handled by Nurse Triage.

## 2019-11-26 NOTE — TELEPHONE ENCOUNTER
Patient would like referral sent to [facility] for Dermatology vs others.    Skin lesion [L98.9]  - Primary     Fax is [phone]    Marla Gomez

## 2019-12-04 NOTE — TELEPHONE ENCOUNTER
Patient calling along with representative from [facility] on the line. They needed verification that this referral was intended for this patient coming from [facility] for dermatology, referral didn't include patient's  or demographics. Gave verbal verification.       Flor Madrid-Patient Rep

## 2019-12-05 NOTE — TELEPHONE ENCOUNTER
Called patient to discuss referral, patient states he has made an appointment feels referral status is fine, has been received. I have printed, referral again and will send to patient to hand carry to appointment. Ruth Behrens.

## 2019-12-05 NOTE — TELEPHONE ENCOUNTER
Representative from [facility] called stating they received a fax from us about this patient for a referral     Please double check we are faxing to correct [facility] if that is where patient is going, in [place].     Lu Crespo/FELY

## 2019-12-11 ENCOUNTER — TRANSFERRED RECORDS (OUTPATIENT)
Dept: HEALTH INFORMATION MANAGEMENT | Facility: CLINIC | Age: 64
End: 2019-12-11

## 2019-12-19 ENCOUNTER — OFFICE VISIT (OUTPATIENT)
Dept: FAMILY MEDICINE | Facility: CLINIC | Age: 64
End: 2019-12-19
Payer: COMMERCIAL

## 2019-12-19 VITALS
BODY MASS INDEX: 25.56 KG/M2 | SYSTOLIC BLOOD PRESSURE: 126 MMHG | WEIGHT: 156 LBS | OXYGEN SATURATION: 97 % | HEART RATE: 60 BPM | DIASTOLIC BLOOD PRESSURE: 83 MMHG | TEMPERATURE: 97.7 F

## 2019-12-19 DIAGNOSIS — Z11.4 ENCOUNTER FOR SCREENING FOR HIV: ICD-10-CM

## 2019-12-19 DIAGNOSIS — Z00.00 ENCOUNTER FOR MEDICARE ANNUAL WELLNESS EXAM: Primary | ICD-10-CM

## 2019-12-19 DIAGNOSIS — Z12.5 SCREENING FOR PROSTATE CANCER: ICD-10-CM

## 2019-12-19 DIAGNOSIS — N18.30 CKD (CHRONIC KIDNEY DISEASE) STAGE 3, GFR 30-59 ML/MIN (H): ICD-10-CM

## 2019-12-19 DIAGNOSIS — Z11.59 NEED FOR HEPATITIS C SCREENING TEST: ICD-10-CM

## 2019-12-19 DIAGNOSIS — Z13.6 SCREENING FOR AAA (ABDOMINAL AORTIC ANEURYSM): ICD-10-CM

## 2019-12-19 DIAGNOSIS — Z23 NEED FOR PNEUMOCOCCAL VACCINATION: ICD-10-CM

## 2019-12-19 DIAGNOSIS — Z23 NEED FOR PROPHYLACTIC VACCINATION AND INOCULATION AGAINST INFLUENZA: ICD-10-CM

## 2019-12-19 DIAGNOSIS — Z71.89 ADVANCED CARE PLANNING/COUNSELING DISCUSSION: ICD-10-CM

## 2019-12-19 LAB
BASOPHILS # BLD AUTO: 0.1 10E9/L (ref 0–0.2)
BASOPHILS NFR BLD AUTO: 0.8 %
DIFFERENTIAL METHOD BLD: ABNORMAL
EOSINOPHIL # BLD AUTO: 0.4 10E9/L (ref 0–0.7)
EOSINOPHIL NFR BLD AUTO: 4.7 %
ERYTHROCYTE [DISTWIDTH] IN BLOOD BY AUTOMATED COUNT: 12.2 % (ref 10–15)
HCT VFR BLD AUTO: 44 % (ref 40–53)
HGB BLD-MCNC: 14.9 G/DL (ref 13.3–17.7)
LYMPHOCYTES # BLD AUTO: 2 10E9/L (ref 0.8–5.3)
LYMPHOCYTES NFR BLD AUTO: 24.2 %
MCH RBC QN AUTO: 33.3 PG (ref 26.5–33)
MCHC RBC AUTO-ENTMCNC: 33.9 G/DL (ref 31.5–36.5)
MCV RBC AUTO: 98 FL (ref 78–100)
MONOCYTES # BLD AUTO: 1 10E9/L (ref 0–1.3)
MONOCYTES NFR BLD AUTO: 11.9 %
NEUTROPHILS # BLD AUTO: 4.9 10E9/L (ref 1.6–8.3)
NEUTROPHILS NFR BLD AUTO: 58.4 %
PLATELET # BLD AUTO: 219 10E9/L (ref 150–450)
RBC # BLD AUTO: 4.48 10E12/L (ref 4.4–5.9)
WBC # BLD AUTO: 8.3 10E9/L (ref 4–11)

## 2019-12-19 PROCEDURE — 90662 IIV NO PRSV INCREASED AG IM: CPT | Performed by: PHYSICIAN ASSISTANT

## 2019-12-19 PROCEDURE — 80048 BASIC METABOLIC PNL TOTAL CA: CPT | Performed by: PHYSICIAN ASSISTANT

## 2019-12-19 PROCEDURE — 90472 IMMUNIZATION ADMIN EACH ADD: CPT | Performed by: PHYSICIAN ASSISTANT

## 2019-12-19 PROCEDURE — 36415 COLL VENOUS BLD VENIPUNCTURE: CPT | Performed by: PHYSICIAN ASSISTANT

## 2019-12-19 PROCEDURE — 99397 PER PM REEVAL EST PAT 65+ YR: CPT | Mod: 25 | Performed by: PHYSICIAN ASSISTANT

## 2019-12-19 PROCEDURE — 87389 HIV-1 AG W/HIV-1&-2 AB AG IA: CPT | Performed by: PHYSICIAN ASSISTANT

## 2019-12-19 PROCEDURE — 80061 LIPID PANEL: CPT | Performed by: PHYSICIAN ASSISTANT

## 2019-12-19 PROCEDURE — 85025 COMPLETE CBC W/AUTO DIFF WBC: CPT | Performed by: PHYSICIAN ASSISTANT

## 2019-12-19 PROCEDURE — G0103 PSA SCREENING: HCPCS | Performed by: PHYSICIAN ASSISTANT

## 2019-12-19 PROCEDURE — 90471 IMMUNIZATION ADMIN: CPT | Performed by: PHYSICIAN ASSISTANT

## 2019-12-19 PROCEDURE — 90670 PCV13 VACCINE IM: CPT | Performed by: PHYSICIAN ASSISTANT

## 2019-12-19 PROCEDURE — 86803 HEPATITIS C AB TEST: CPT | Performed by: PHYSICIAN ASSISTANT

## 2019-12-19 NOTE — PROGRESS NOTES
"  SUBJECTIVE:   Harley Alvarado is a 64 year old male who presents for Preventive Visit.      Are you in the first 12 months of your Medicare Part B coverage?  No    Physical Health:    In general, how would you rate your overall physical health? good    Outside of work, how many days during the week do you exercise? 4-5 days/week    Outside of work, approximately how many minutes a day do you exercise?greater than 60 minutes    If you drink alcohol do you typically have >3 drinks per day or >7 drinks per week? No    Do you usually eat at least 4 servings of fruit and vegetables a day, include whole grains & fiber and avoid regularly eating high fat or \"junk\" foods? Yes    Do you have any problems taking medications regularly?  No    Do you have any side effects from medications? none    Needs assistance for the following daily activities: no assistance needed    Which of the following safety concerns are present in your home?  none identified     Hearing impairment: No    In the past 6 months, have you been bothered by leaking of urine? no    Mental Health:    In general, how would you rate your overall mental or emotional health? good  PHQ-2 Score:      Do you feel safe in your environment? Yes    Have you ever done Advance Care Planning? (For example, a Health Directive, POLST, or a discussion with a medical provider or your loved ones about your wishes): No, advance care planning information given to patient to review.  Advanced care planning was discussed at today's visit.    Additional concerns to address?  No    Fall risk:  Fallen 2 or more times in the past year?: No  Any fall with injury in the past year?: No    Cognitive Screenin) Repeat 3 items (Leader, Season, Table)    2) Clock draw: NORMAL  3) 3 item recall: Recalls 3 objects  Results: 3 items recalled: COGNITIVE IMPAIRMENT LESS LIKELY    Mini-CogTM Copyright S [employer]. Licensed by the author for use in [facility]; reprinted with " permission (you@[facility]). All rights reserved.      Do you have sleep apnea, excessive snoring or daytime drowsiness?: yes          Reviewed and updated as needed this visit by clinical staff  Tobacco  Allergies  Meds  Problems  Med Hx  Surg Hx  Fam Hx  Soc Hx          Reviewed and updated as needed this visit by Provider  Tobacco  Allergies  Meds  Problems  Med Hx  Surg Hx  Fam Hx        Social History     Tobacco Use     Smoking status: Former Smoker     Smokeless tobacco: Former User     Quit date: 1974   Substance Use Topics     Alcohol use: Yes     Comment: occasional                           Current providers sharing in care for this patient include:   Patient Care Team:  Cory Acosta PA-C as PCP - General (Physician Assistant)  Cory Acosta PA-C as Assigned PCP    The following health maintenance items are reviewed in Epic and correct as of today:  Health Maintenance   Topic Date Due     HEPATITIS C SCREENING  1955     ADVANCE CARE PLANNING  1955     DEPRESSION ACTION PLAN  1955     COLONOSCOPY  12/07/1964     HIV SCREENING  12/07/1969     ZOSTER IMMUNIZATION (1 of 2) 12/07/2004     INFLUENZA VACCINE (1) 09/01/2019     FALL RISK ASSESSMENT  12/07/2019     PNEUMOCOCCAL IMMUNIZATION 65+ HIGH/HIGHEST RISK (1 of 2 - PCV13) 12/07/2019     AORTIC ANEURYSM SCREENING (SYSTEM ASSIGNED)  12/07/2019     MEDICARE ANNUAL WELLNESS VISIT  12/07/2019     ANNUAL REVIEW OF HM ORDERS  11/22/2020     PHQ-9  11/22/2020     LIPID  10/02/2022     DTAP/TDAP/TD IMMUNIZATION (3 - Td) 09/29/2027     IPV IMMUNIZATION  Aged Out     MENINGITIS IMMUNIZATION  Aged Out     BP Readings from Last 3 Encounters:   12/19/19 126/83   11/22/19 117/75   06/14/19 99/69    Wt Readings from Last 3 Encounters:   12/19/19 70.8 kg (156 lb)   11/22/19 70.3 kg (154 lb 14.4 oz)   06/14/19 68.9 kg (152 lb)                  Patient Active Problem List   Diagnosis     Allergic conjunctivitis     Dyslipidemia      GERD (gastroesophageal reflux disease)     Latent tuberculosis     Major depression, recurrent (H)     Membranous glomerulonephritis     Nephropathy     Pulmonary embolism (H)     Benign essential hypertension     Dysplastic nevi     Chronic anticoagulation     Long-term (current) use of anticoagulants [Z79.01]     History of dysplastic nevus     Chronic right shoulder pain     CKD (chronic kidney disease) stage 3, GFR 30-59 ml/min (H)     Past Surgical History:   Procedure Laterality Date     HERNIA REPAIR  2015       Social History     Tobacco Use     Smoking status: Former Smoker     Smokeless tobacco: Former User     Quit date: 1974   Substance Use Topics     Alcohol use: Yes     Comment: occasional     Family History   Problem Relation Age of Onset     Alzheimer Disease Mother      Heart Disease Father          Current Outpatient Medications   Medication Sig Dispense Refill     cholecalciferol (VITAMIN D3) 1000 units (25 mcg) capsule Take 1 capsule by mouth daily       lisinopril (PRINIVIL/ZESTRIL) 5 MG tablet Take 1 tablet (5 mg) by mouth daily 90 tablet      No Known Allergies  Recent Labs   Lab Test 10/02/17  0759 09/12/17 06/09/14   LDL 82  --   --  80   HDL 44  --   --  46   TRIG 119  --   --  151   ALT 28  --   --   --    CR 1.38* 1.4*   < > 1.4*   GFRESTIMATED 52* 51*   < > 52*   GFRESTBLACK 63 >60   < > >60   POTASSIUM 4.2 4.7  4.3   < >  --     < > = values in this interval not displayed.      Pneumonia Vaccine:Adults age 65+ who received Pneumovax (PPSV23) at 65 years or older: Should be given PCV13 > 1 year after their most recent PPSV23    ROS:  Constitutional, HEENT, cardiovascular, pulmonary, GI, , musculoskeletal, neuro, skin, endocrine and psych systems are negative, except as otherwise noted.    OBJECTIVE:   /83 (BP Location: Right arm, Patient Position: Chair, Cuff Size: Adult Regular)   Pulse 60   Temp 97.7  F (36.5  C) (Oral)   Wt 70.8 kg (156 lb)   SpO2 97%   BMI 25.56  "kg/m   Estimated body mass index is 25.56 kg/m  as calculated from the following:    Height as of 11/22/19: 1.664 m (5' 5.51\").    Weight as of this encounter: 70.8 kg (156 lb).  EXAM:   GENERAL: healthy, alert and no distress  EYES: Eyes grossly normal to inspection, PERRL and conjunctivae and sclerae normal  HENT: ear canals and TM's normal, nose and mouth without ulcers or lesions  NECK: no adenopathy, no asymmetry, masses, or scars and thyroid normal to palpation  RESP: lungs clear to auscultation - no rales, rhonchi or wheezes  CV: regular rate and rhythm, normal S1 S2, no S3 or S4, no murmur, click or rub, no peripheral edema and peripheral pulses strong  ABDOMEN: soft, nontender, no hepatosplenomegaly, no masses and bowel sounds normal  MS: no gross musculoskeletal defects noted, no edema  SKIN: no suspicious lesions or rashes  NEURO: Normal strength and tone, mentation intact and speech normal  PSYCH: mentation appears normal, affect normal/bright  LYMPH: no cervical adenopathy    Diagnostic Test Results:  none     ASSESSMENT / PLAN:   (Z00.00) Encounter for Medicare annual wellness exam  (primary encounter diagnosis)  Comment: stable exam.   Plan: Lipid panel reflex to direct LDL Fasting            (Z23) Need for prophylactic vaccination and inoculation against influenza  Comment:   Plan: INFLUENZA (HIGH DOSE) 3 VALENT VACCINE [91062],        Vaccine Administration, Initial [08838]            (Z71.89) Advanced care planning/counseling discussion  Comment:   Plan: HONORING CHOICES REFERRAL            (Z23) Need for pneumococcal vaccination  Comment:   Plan: PCV13, IM (6+ WK) - [id number]            (Z11.4) Encounter for screening for HIV  Comment:   Plan: HIV Antigen Antibody Combo            (Z11.59) Need for hepatitis C screening test  Comment:   Plan: **Hepatitis C Screen Reflex to RNA FUTURE         anytime            (N18.3) CKD (chronic kidney disease) stage 3, GFR 30-59 ml/min (H)  Comment: due for " "nephrology follow up in 4 days. Will check today.   Plan: Basic metabolic panel, CBC with platelets and         differential            (Z12.5) Screening for prostate cancer  Comment:   Plan: Prostate spec antigen screen              COUNSELING:  Reviewed preventive health counseling, as reflected in patient instructions       Consider AAA screening for ages 65-75 and smoking history       Regular exercise       Healthy diet/nutrition       Colon cancer screening    Estimated body mass index is 25.56 kg/m  as calculated from the following:    Height as of 11/22/19: 1.664 m (5' 5.51\").    Weight as of this encounter: 70.8 kg (156 lb).         reports that he has quit smoking. He quit smokeless tobacco use about 45 years ago.      Appropriate preventive services were discussed with this patient, including applicable screening as appropriate for cardiovascular disease, diabetes, osteopenia/osteoporosis, and glaucoma.  As appropriate for age/gender, discussed screening for colorectal cancer, prostate cancer, breast cancer, and cervical cancer. Checklist reviewing preventive services available has been given to the patient.    Reviewed patients plan of care and provided an AVS. The Basic Care Plan (routine screening as documented in Health Maintenance) for Harley meets the Care Plan requirement. This Care Plan has been established and reviewed with the Patient.    Counseling Resources:  ATP IV Guidelines  Pooled Cohorts Equation Calculator  Breast Cancer Risk Calculator  FRAX Risk Assessment  ICSI Preventive Guidelines  Dietary Guidelines for Americans, 2010  USDA's MyPlate  ASA Prophylaxis  Lung CA Screening    Cory Acosta PA-C  [facility]"

## 2019-12-19 NOTE — PATIENT INSTRUCTIONS
Patient Education   Personalized Prevention Plan  You are due for the preventive services outlined below.  Your care team is available to assist you in scheduling these services.  If you have already completed any of these items, please share that information with your care team to update in your medical record.  Health Maintenance Due   Topic Date Due     Hepatitis C Screening  1955     Discuss Advance Care Planning  1955     Depression Action Plan  1955     Colonoscopy  12/07/1964     HIV Screening  12/07/1969     Zoster (Shingles) Vaccine (1 of 2) 12/07/2004     Flu Vaccine (1) 09/01/2019     FALL RISK ASSESSMENT  12/07/2019     Pneumococcal Vaccine (1 of 2 - PCV13) 12/07/2019     AORTIC ANEURYSM SCREENING (SYSTEM ASSIGNED)  12/07/2019     Annual Wellness Visit  12/07/2019

## 2019-12-20 LAB
ANION GAP SERPL CALCULATED.3IONS-SCNC: 7 MMOL/L (ref 3–14)
BUN SERPL-MCNC: 22 MG/DL (ref 7–30)
CALCIUM SERPL-MCNC: 9.2 MG/DL (ref 8.5–10.1)
CHLORIDE SERPL-SCNC: 103 MMOL/L (ref 94–109)
CHOLEST SERPL-MCNC: 187 MG/DL
CO2 SERPL-SCNC: 27 MMOL/L (ref 20–32)
CREAT SERPL-MCNC: 1.21 MG/DL (ref 0.66–1.25)
GFR SERPL CREATININE-BSD FRML MDRD: 62 ML/MIN/{1.73_M2}
GLUCOSE SERPL-MCNC: 90 MG/DL (ref 70–99)
HCV AB SERPL QL IA: NONREACTIVE
HDLC SERPL-MCNC: 45 MG/DL
HIV 1+2 AB+HIV1 P24 AG SERPL QL IA: NONREACTIVE
LDLC SERPL CALC-MCNC: 124 MG/DL
NONHDLC SERPL-MCNC: 142 MG/DL
POTASSIUM SERPL-SCNC: 4.3 MMOL/L (ref 3.4–5.3)
PSA SERPL-ACNC: 2.71 UG/L (ref 0–4)
SODIUM SERPL-SCNC: 137 MMOL/L (ref 133–144)
TRIGL SERPL-MCNC: 92 MG/DL

## 2020-01-03 ENCOUNTER — DOCUMENTATION ONLY (OUTPATIENT)
Dept: SLEEP MEDICINE | Facility: CLINIC | Age: 65
End: 2020-01-03

## 2020-01-03 NOTE — PROGRESS NOTES
6 month [facility] Recheck Visit     Diagnostic AHI: 9.4    PSG    Message left for patient to return call     Assessment: Pt not meeting objective benchmarks for compliance     Action plan: waiting for patient to return call.   pt to follow up per provider request       Device type: Auto-CPAP  PAP settings: CPAP min 5.0 cm  H20     CPAP max 15.0 cm  H20    CPAP fixed 0 cm  H20    95th% pressure 11.6 cm  H20   Objective measures: 14 day rolling measures      Compliance  28 %      Leak  20.3 lpm  last  upload      AHI 0.84   last  upload      Average number of minutes 171      Objective measure goal  Compliance   Goal >70%  Leak   Goal < 24 lpm  AHI  Goal < 5  Usage  Goal >240    Total time spent on accessing, reviewing and interpreting remote patient PAP therapy data:   10 minutes      Total time spent with direct patient communication :   1 minutes

## 2020-01-03 NOTE — PROGRESS NOTES
Patient returned call.    Subjective measures:  Patient states he missed his follow up appointment.  He feels the machine is working.  He has some issues with insomnia and does not use for then entire night.  He has some oral  dryness with mask.      Assessment: Pt not meeting objective benchmarks for compliance  Patient failing following subjective benchmarks: dryness      Action plan:follow up per provider request    Total time spent with patient contact today:   10 minutes

## 2020-01-07 ENCOUNTER — HOSPITAL ENCOUNTER (OUTPATIENT)
Dept: ULTRASOUND IMAGING | Facility: CLINIC | Age: 65
Discharge: HOME | End: 2020-01-07
Attending: PHYSICIAN ASSISTANT | Admitting: PHYSICIAN ASSISTANT
Payer: COMMERCIAL

## 2020-01-07 PROCEDURE — 76775 US EXAM ABDO BACK WALL LIM: CPT

## 2020-01-22 ENCOUNTER — ANCILLARY PROCEDURE (OUTPATIENT)
Dept: GENERAL RADIOLOGY | Facility: CLINIC | Age: 65
End: 2020-01-22
Attending: PHYSICIAN ASSISTANT
Payer: COMMERCIAL

## 2020-01-22 ENCOUNTER — OFFICE VISIT (OUTPATIENT)
Dept: FAMILY MEDICINE | Facility: CLINIC | Age: 65
End: 2020-01-22
Payer: COMMERCIAL

## 2020-01-22 VITALS
BODY MASS INDEX: 26.47 KG/M2 | TEMPERATURE: 97.9 F | WEIGHT: 161.6 LBS | HEART RATE: 57 BPM | DIASTOLIC BLOOD PRESSURE: 77 MMHG | SYSTOLIC BLOOD PRESSURE: 117 MMHG

## 2020-01-22 DIAGNOSIS — I26.99 OTHER PULMONARY EMBOLISM WITHOUT ACUTE COR PULMONALE, UNSPECIFIED CHRONICITY (H): ICD-10-CM

## 2020-01-22 DIAGNOSIS — N18.30 CKD (CHRONIC KIDNEY DISEASE) STAGE 3, GFR 30-59 ML/MIN (H): ICD-10-CM

## 2020-01-22 DIAGNOSIS — M25.561 RIGHT KNEE PAIN, UNSPECIFIED CHRONICITY: ICD-10-CM

## 2020-01-22 DIAGNOSIS — M25.561 RIGHT KNEE PAIN, UNSPECIFIED CHRONICITY: Primary | ICD-10-CM

## 2020-01-22 DIAGNOSIS — F33.42 RECURRENT MAJOR DEPRESSIVE DISORDER, IN FULL REMISSION (H): ICD-10-CM

## 2020-01-22 PROCEDURE — 99214 OFFICE O/P EST MOD 30 MIN: CPT | Performed by: PHYSICIAN ASSISTANT

## 2020-01-22 PROCEDURE — 73565 X-RAY EXAM OF KNEES: CPT

## 2020-01-22 PROCEDURE — 73560 X-RAY EXAM OF KNEE 1 OR 2: CPT | Mod: RT

## 2020-01-22 ASSESSMENT — PATIENT HEALTH QUESTIONNAIRE - PHQ9
SUM OF ALL RESPONSES TO PHQ QUESTIONS 1-9: 1
SUM OF ALL RESPONSES TO PHQ QUESTIONS 1-9: 1

## 2020-01-22 ASSESSMENT — ANXIETY QUESTIONNAIRES
1. FEELING NERVOUS, ANXIOUS, OR ON EDGE: NOT AT ALL
GAD7 TOTAL SCORE: 0
7. FEELING AFRAID AS IF SOMETHING AWFUL MIGHT HAPPEN: NOT AT ALL
4. TROUBLE RELAXING: NOT AT ALL
7. FEELING AFRAID AS IF SOMETHING AWFUL MIGHT HAPPEN: NOT AT ALL
GAD7 TOTAL SCORE: 0
6. BECOMING EASILY ANNOYED OR IRRITABLE: NOT AT ALL
3. WORRYING TOO MUCH ABOUT DIFFERENT THINGS: NOT AT ALL
5. BEING SO RESTLESS THAT IT IS HARD TO SIT STILL: NOT AT ALL
2. NOT BEING ABLE TO STOP OR CONTROL WORRYING: NOT AT ALL
GAD7 TOTAL SCORE: 0

## 2020-01-22 NOTE — PATIENT INSTRUCTIONS
Patient Education     What Is Bursitis?  A bursa is a fluid-filled sac. It helps cushion the muscles, tendons, and bones around a joint. When a bursa becomes inflamed, it s called bursitis. Common symptoms are pain, tenderness, and swelling that limits movement of the joint.  What causes bursitis?  Bursitis is most often caused by overuse of a joint. The repeated movements bother the bursa and may cause it to swell. When that happens, other nearby tissues may become inflamed or have less space to move. Bursitis is most common in large joints such as the knee, shoulder, and hip.       Nonsurgical treatment involves both rest and exercise.      How is bursitis treated?  To help lessen pain and swelling, you may need one or more of these treatments:     Rest gives the bursa time to heal. This means limiting activities that put stress on the joint.    Ice may help. It's put on the area for 15 to 20 minutes several times a day, or as directed.    Anti-inflammatory medicines help with painful swelling. In some cases, this can be shots of cortisone or other steroid medicines into the bursa.    Splints and supportive bandages improve your comfort. They also allow the bursa to heal.    Physical therapy may be used to gain flexibility and build up muscles that support the joint.    Aspiration removes extra fluid from the bursa using a needle. This can help your healthcare provider find out what is causing your bursitis. It might be an infection or overuse.     Surgery can be used to remove an inflamed or infected bursa. This is rarely needed.  Date Last Reviewed: 1/1/2018 2000-2019 [employer]. [address] [place] [address], [place] [address]. All rights reserved. This information is not intended as a substitute for professional medical care. Always follow your healthcare professional's instructions.

## 2020-01-22 NOTE — PROGRESS NOTES
Subjective     Harley Alvarado is a 64 year old male who presents to clinic today for the following health issues:    Knee Pain     History of Present Illness        He eats 4 or more servings of fruits and vegetables daily.He consumes 2 sweetened beverage(s) daily.He exercises with enough effort to increase his heart rate 9 or less minutes per day.  He exercises with enough effort to increase his heart rate 3 or less days per week.   He is taking medications regularly.     Joint Pain    Onset: 2 months    Description:   Location: right knee  Character: Burning    Intensity: 8/10    Progression of Symptoms: not sure    Accompanying Signs & Symptoms:  Other symptoms: none    History:   Previous similar pain: no       Precipitating factors:   Trauma or overuse: no     Alleviating factors:  Improved by: nothing    Therapies Tried and outcome: none      Patient Active Problem List   Diagnosis     Allergic conjunctivitis     Dyslipidemia     GERD (gastroesophageal reflux disease)     Latent tuberculosis     Major depression, recurrent (H)     Membranous glomerulonephritis     Nephropathy     Pulmonary embolism (H)     Benign essential hypertension     Dysplastic nevi     Chronic anticoagulation     Long-term (current) use of anticoagulants [Z79.01]     History of dysplastic nevus     Chronic right shoulder pain     CKD (chronic kidney disease) stage 3, GFR 30-59 ml/min (H)     Past Surgical History:   Procedure Laterality Date     HERNIA REPAIR  2015       Social History     Tobacco Use     Smoking status: Former Smoker     Smokeless tobacco: Former User     Quit date: 1974   Substance Use Topics     Alcohol use: Yes     Comment: occasional     Family History   Problem Relation Age of Onset     Alzheimer Disease Mother      Heart Disease Father          Current Outpatient Medications   Medication Sig Dispense Refill     cholecalciferol (VITAMIN D3) 1000 units (25 mcg) capsule Take 1 capsule by mouth daily        lisinopril (PRINIVIL/ZESTRIL) 5 MG tablet Take 1 tablet (5 mg) by mouth daily 90 tablet      Allergies   Allergen Reactions     Dapsone Other (See Comments)     Anemia     Recent Labs   Lab Test 12/19/19  1036 10/02/17  0759  06/09/14   * 82  --  80   HDL 45 44  --  46   TRIG 92 119  --  151   ALT  --  28  --   --    CR 1.21 1.38*   < > 1.4*   GFRESTIMATED 62 52*   < > 52*   GFRESTBLACK 72 63   < > >60   POTASSIUM 4.3 4.2   < >  --     < > = values in this interval not displayed.      BP Readings from Last 3 Encounters:   01/22/20 117/77   12/19/19 126/83   11/22/19 117/75    Wt Readings from Last 3 Encounters:   01/22/20 73.3 kg (161 lb 9.6 oz)   12/19/19 70.8 kg (156 lb)   11/22/19 70.3 kg (154 lb 14.4 oz)                    Reviewed and updated as needed this visit by Provider         Review of Systems   ROS COMP: Constitutional, msk, neuro, cardiovascular, pulmonary, gi and gu systems are negative, except as otherwise noted.      Objective    /77 (BP Location: Right arm, Patient Position: Chair, Cuff Size: Adult Regular)   Pulse 57   Temp 97.9  F (36.6  C) (Oral)   Wt 73.3 kg (161 lb 9.6 oz)   BMI 26.47 kg/m    Body mass index is 26.47 kg/m .  Physical Exam   GENERAL APPEARANCE: healthy, alert and no distress  ORTHO: Knee Exam: Inspection: AP/lateral alignment normal, No effusion, No quad atrophy  Tender: none  Non-tender: lateral patellar facet, medial patellar facet, inferior pole patella, patella tendon, quadriceps insertion, MCL, LCL, lateral joint line, medial joint line, medial tibial plateau, lateral tibial plateau, medial femoral condyle, lateral femoral condyle, distal IT band, prepatellar bursa, popliteal region, pes anserine bursa  Active Range of Motion: full flexion, no pain with flexion, full extension, pain with extension  Strength: quad  5/5 and Hamstrings  5/5  Special tests: normal Valgus stress test, normal Varus, negative Lachman's test, negative posterior drawer, negative  Dena's   PSYCH: mentation appears normal and affect normal/bright    Diagnostic Test Results:  X-ray right knee 2 views:negative   Xray AP bilateral standing: negative         Assessment & Plan     (M25.561) Right knee pain, unspecified chronicity  (primary encounter diagnosis)  Comment: unclear cause. Radiographs negative. Likely mild bursitis recurring with kneeling. Avoid kneeling when possible and also using pad when needed. Ice scheduled. Continue to monitor. If needed, physical therapy and possible steroid may be helpful.   Plan: XR Knee Right 1/2 Views, XR Knee AP Standing         Bilateral                 Return in about 11 months (around 12/22/2020) for Physical Exam.    Cory Acosta PA-C  [facility]    Answers for HPI/ROS submitted by the patient on 1/22/2020   Chronic problems general questions HPI Form  PHQ9 TOTAL SCORE: 1  LIZZIE 7 TOTAL SCORE: 0

## 2020-01-23 ASSESSMENT — PATIENT HEALTH QUESTIONNAIRE - PHQ9: SUM OF ALL RESPONSES TO PHQ QUESTIONS 1-9: 1

## 2020-01-23 ASSESSMENT — ANXIETY QUESTIONNAIRES: GAD7 TOTAL SCORE: 0

## 2020-02-11 ENCOUNTER — TELEPHONE (OUTPATIENT)
Dept: SLEEP MEDICINE | Facility: CLINIC | Age: 65
End: 2020-02-11

## 2020-02-11 NOTE — TELEPHONE ENCOUNTER
RETURNED PT CALL AND THE PT STATED THAT THE PRESSURES ARE TOO HIGH. LET THE PT KNOW THAT HIS RX IS 5-15CM H20 AND THAT ANYTIME DURING THE NIGHT HE MIGHT EXPERIENCE THAT HIGHER RANGE. ALSO LET THE PT KNOW THAT HE CAN START HIS MACHINE OVER TO INITIATE THE RAMP FEATURE IF NEEDED. ALSO INFORMED THE PT THAT HE COULD REACH OUT TO HIS SLEEP PROVIDER AS WELL IF HE IS NOT ABLE TO TOLERATE THE HIGHER PRESSURES.

## 2020-03-12 ENCOUNTER — TELEPHONE (OUTPATIENT)
Dept: FAMILY MEDICINE | Facility: CLINIC | Age: 65
End: 2020-03-12

## 2020-03-12 NOTE — TELEPHONE ENCOUNTER
Patient Quality Outreach Summary      Summary:    Patient is due/failing the following:   Colonoscopy    Type of outreach:    Type of outreach:  Phone, left message for patient to call back.         Questions for provider review:    None                                                                                                                    MORIAH Corea       Chart routed to Care Team.

## 2020-03-14 ENCOUNTER — NURSE TRIAGE (OUTPATIENT)
Dept: NURSING | Facility: CLINIC | Age: 65
End: 2020-03-14

## 2020-03-14 NOTE — TELEPHONE ENCOUNTER
RN triage    Patient returning from [place] yesterday 3/13/2020  Start of trip 3/5/2020  Cough at beginning of trip throughout trip and now at home.  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  Will self isolate as needed. Patient states he had trouble breathing a few days ago. Denies this now, talking well in full sentences, no cyanosis.  Gave disposition for [email or web address] at this time and patient stated understanding.  Patient will call back with any other questions.  Viridiana Light RN  [facility] Nurse Advisor          Reason for Disposition    [1] No CORONAVIRUS EXPOSURE BUT [2] questions about    Additional Information    Negative: Severe difficulty breathing (e.g., struggling for each breath, speak in single words, bluish lips)    Negative: Sounds like a life-threatening emergency to the triager    Negative: [1] Difficulty breathing (shortness of breath) occurs AND [2] onset > 14 days after CORONAVIRUS EXPOSURE (Close Contact)    Negative: [1] Dry cough occurs AND [2] onset > 14 days after CORONAVIRUS EXPOSURE    Negative: [1] Wet cough (i.e., white-yellow, yellow, green, or pete colored sputum) AND [2] onset > 14 days after CORONAVIRUS EXPOSURE    Negative: [1] Common cold symptoms AND [2] onset > 14 days after CORONAVIRUS EXPOSURE    Negative: [1] Difficulty breathing occurs AND [2] within 14 days of CORONAVIRUS EXPOSURE    Negative: Patient sounds very sick or weak to the triager    Negative: [1] Fever or feeling feverish AND [2] within 14 Days of CORONAVIRUS EXPOSURE    Negative: [1] Cough occurs AND [2] within 14 days of CORONAVIRUS EXPOSURE    Negative: [1] Fever or feeling feverish AND [2] symptoms of lower respiratory illness (e.g., cough, difficulty breathing) AND [3] TRAVEL FROM [place] within last 14 days    Negative: [1] Body aches, chills, diarrhea, headache, runny nose, or sore throat occur AND [2] within 14  days of CORONAVIRUS EXPOSURE    Negative: [1] CORONAVIRUS EXPOSURE within last 14 days AND [2] NO cough, fever, or breathing difficulty    Negative: [1] TRAVEL FROM [place] in last 14 days AND  [2] NO cough or fever or breathing difficulty    Negative: [1] CORONAVIRUS EXPOSURE 15 or more days ago AND [2] NO cough or fever or breathing difficulty    [facility] Specific Disposition  - REQUIRED: [facility] Specific Patient Instructions  COVID 19 Nurse Triage Plan    Patient referred to virtual visit with a provider through [facility] (Preferred option). **Follow System Ambulatory Workflow for COVID 19 on instructions on how to access [facility]**     Instructions Given to Patient  It is recommended that you setup a virtual visit with one of our virtual providers.  To do this follow these instructions:    1. Go to the website [email or web address]  2. Create an account (you will need your insurance information)  3. Start a new visit  4. Choose your diagnosis (e.g. COVID19)  5. Fill out the information about your symptoms  6. A provider will reach out to you by text, phone call or video visit based on your request    While you are at home please follow these instructions to care for yourself:    Isolate Yourself:  Isolate yourself at home.   Do Not allow any visitors  Do Not go to work or school  Do Not go to [employer],  centers, shopping, or other public places.  Do Not shake hands.  Avoid close contact with others (hugging, kissing).    Protect Others:  Cover Your Mouth and Nose with a mask, disposable tissue or wash cloth to avoid spreading germs to others.  Wash your hands and face frequently with soap and water.    Fever Medicines:  For fever relief, take acetaminophen or ibuprofen.  Treat fevers above 101  F (38.3  C) to lower fevers and make you more comfortable.   Acetaminophen (e.g., Tylenol): Take 650 mg (two 325 mg pills) by mouth every 4-6 hours as needed of regular strength Tyleno or 1,000 mg (two  "500 mg pills) every 8 hours as needed of Extra Strength Tylenol.   Ibuprofen (e.g., Motrin, Advil): Take 400 mg (two 200 mg pills) by mouth every 6 hours as needed.   Acetaminophen is thought to be safer than ibuprofen or naproxen for people over 65 years old. Acetaminophen is in many OTC and prescription medicines. It might be in more than one medicine that you are taking. You need to be careful and not take an overdose. Before taking any medicine, read all the instructions on the package.  Caution -NSAIDs (e.g., ibuprofen, naproxen): Do not take nonsteroidal anti-inflammatory drugs (NSAIDs) if you have stomach problems, kidney disease, heart failure, or other contraindications to using this type of medicine. Do not take NSAID medicines for over 7 days without consulting your PCP. Do not take NSAID medicines if you are pregnant. Do not take NSAID medicines if you are also taking blood thinners.     Call Back If: Breathing difficulty develops or you become worse.    Thank you for limiting contact with others, wearing a simple mask to cover your cough, practice good hand hygiene habits and accessing our virtual services where possible to limit the spread of this virus.    For more information about COVID19 and options for caring for yourself at home, please visit the CDC website at https://www.cdc.gov/coronavirus/2019-ncov/about/steps-when-sick.html  For more options for care at [facility], please visit our website at [email or web address]    Answer Assessment - Initial Assessment Questions  1. PLACE of CONTACT: \"Where were you when you were exposed to coronavirus?\" (e.g., city, state, country)      Has been in [place]  2. TYPE of CONTACT: \"How much contact was there?\" (e.g., live in same house, work in same office, same school) Unknown       3. DATE of CONTACT: \"When did you have contact with a coronavirus patient?\" (e.g., days)      Unknown but traveled 3/5/2020 to 3/13/2020  4. " "SYMPTOMS: \"Do you have any symptoms?\" (e.g., fever, cough, breathing difficulty)      Cough but cough started before leaving trip, had throughout trip and has today still.    Protocols used: CORONAVIRUS (2019-NCOV) EXPOSURE-A-AH      "

## 2020-03-15 ENCOUNTER — COMMUNICATION - HEALTHEAST (OUTPATIENT)
Dept: SCHEDULING | Facility: CLINIC | Age: 65
End: 2020-03-15

## 2020-03-15 ENCOUNTER — OFFICE VISIT (OUTPATIENT)
Dept: URGENT CARE | Facility: URGENT CARE | Age: 65
End: 2020-03-15
Payer: COMMERCIAL

## 2020-03-15 ENCOUNTER — NURSE TRIAGE (OUTPATIENT)
Dept: NURSING | Facility: CLINIC | Age: 65
End: 2020-03-15

## 2020-03-15 DIAGNOSIS — J11.1 INFLUENZA-LIKE ILLNESS: Primary | ICD-10-CM

## 2020-03-15 PROCEDURE — 99213 OFFICE O/P EST LOW 20 MIN: CPT | Performed by: INTERNAL MEDICINE

## 2020-03-15 NOTE — PATIENT INSTRUCTIONS
You are being tested for Corona Virus 19, Influenza and possibly RSV.    Please use the information at the end of this document to sign up for [facility] Teleushart where you can get your results and a message about those results sent to you through the [employer] application. If you do not have mychart we will call you with your results but it may take longer.    Isolate Yourself:    Isolate yourself while traveling.    Do Not allow any visitors within 6 feet.    Do Not go to work or school.    Do Not go to [employer],  centers, shopping, or other public places.    Do Not shake hands.    Avoid close contact with others (hugging, kissing).    Protect Others:    Cover Your Mouth and Nose with a mask, disposable tissue or wash cloth to avoid spreading germs to others.    Wash your hands and face frequently with soap and water    Call Back If: Breathing difficulty develops or you become worse.    For more information about COVID19 and options for caring for yourself at home, please visit the CDC website at https://www.cdc.gov/coronavirus/2019-ncov/about/steps-when-sick.html  For more options for care at [facility], please visit our website at [email or web address]

## 2020-03-15 NOTE — TELEPHONE ENCOUNTER
[facility] triage call :  Conferenced by   To [facility] ,  Pt  Was unable to use  [facility] for corona virus screening .    Presenting problem : Pt positive for travel / and has respiratory  symptoms .  Has  Cold /   cough before leaving on trip  On 3/ 2/20 , that is worse and became  productive  Yellow - brown  color sputum  Since being in [facility]  now , without fever or difficulty  breath . Currently : productive cough ,/  1&0 and eating and activity are ok.   Is isolation and staying home and away from adult foster care since return from [facility] 3/13/20 .   Guideline used : Corona Virus Exposure .  Disposition and recommendations : be seen by provider 24 hours and sent message to [facility] for call back for appt.   Caller verbalizes understanding and denies further questions and will call back if further symptoms to triage or questions  . Lakeshia Doherty RN  - [place] Nurse Advisor     Reason for Disposition    [1] Cough occurs AND [2] within 14 days of CORONAVIRUS EXPOSURE    Protocols used: CORONAVIRUS (2019-NCOV) EXPOSURE-A-AH   Routed to [facility] .  Please do not route back to [facility] but  Close Epic  encounter when you are  finished . Caller verbalizes understanding and denies further questions and will call back if  triage requested or other problems not responded to in a timely way .   L[facility] Head nurse advisors .         Reason for Disposition    [1] Cough occurs AND [2] within 14 days of CORONAVIRUS EXPOSURE    Additional Information    Negative: Severe difficulty breathing (e.g., struggling for each breath, speak in single words, bluish lips)    Negative: Sounds like a life-threatening emergency to the triager    Negative: [1] Difficulty breathing (shortness of breath) occurs AND [2] onset > 14 days after CORONAVIRUS EXPOSURE (Close Contact)    Negative: [1] Dry cough occurs AND [2] onset > 14 days after CORONAVIRUS EXPOSURE    Negative: [1] Wet cough (i.e.,  white-yellow, yellow, green, or pete colored sputum) AND [2] onset > 14 days after CORONAVIRUS EXPOSURE    Negative: [1] Common cold symptoms AND [2] onset > 14 days after CORONAVIRUS EXPOSURE    Negative: [1] Difficulty breathing occurs AND [2] within 14 days of CORONAVIRUS EXPOSURE    Negative: Patient sounds very sick or weak to the triager    Negative: [1] Fever or feeling feverish AND [2] within 14 Days of CORONAVIRUS EXPOSURE    Protocols used: CORONAVIRUS (2019-NCOV) EXPOSURE-A-AH

## 2020-03-15 NOTE — PROGRESS NOTES
SUBJECTIVE:  This 64 year old male presents for COVID-19 testing.  he reports cough.  Onset of symptoms was 3/2/2020.  Exposure history: travel to [facility] between 3/5 and 3/13    OBJECTIVE:  Visual assessment shows:  GENERAL APPEARANCE is healthy without evident apparent respiratory distress  BREATHING: the patient is breathing comfortably and is speaking full sentences    ASSESSMENT/PLAN:    ICD-10-CM    1. Influenza-like illness  R69 COVID-19 Virus (Coronavirus), PCR - Nasopharyngeal (NP) Swab [facility]         Vahid Washington MD

## 2020-03-20 LAB — COVID-19 VIRUS PCR RESULT FROM MDH: NEGATIVE

## 2020-03-22 ENCOUNTER — TELEPHONE (OUTPATIENT)
Dept: EMERGENCY MEDICINE | Facility: CLINIC | Age: 65
End: 2020-03-22

## 2020-03-22 NOTE — TELEPHONE ENCOUNTER
Coronavirus (COVID-19) Notification    Reason for call  Notify of Negative COVID-19 lab result, assess symptoms,  review [facility] recommendations    Lab Result    Lab test 2019-nCoV rRt-PCR  Oropharyngeal AND/OR nasopharyngeal swabs were NEGATIVE for 2019-nCoV RNA      RN Recommendations/Instructions per [facility]  Patient notified of Negative COVID-19.    Patient can discontinue Quarantee and is free to resume normal activites.  If Patient has questions that you are not able to answer they can contact PCP or MD hotline ([phone])    Please Contact your PCP clinic or return to the Emergency department if your:    Symptoms worsen or other concerning symptom's.      [RN/LPN Name]  Tammie Severson, LPN

## 2020-04-08 ENCOUNTER — TELEPHONE (OUTPATIENT)
Dept: FAMILY MEDICINE | Facility: CLINIC | Age: 65
End: 2020-04-08

## 2020-04-08 DIAGNOSIS — N05.2 MEMBRANOUS GLOMERULONEPHRITIS: Primary | ICD-10-CM

## 2020-04-09 NOTE — TELEPHONE ENCOUNTER
I would recommend transferring to [facility]. Will route to her. I do not believe he will need another referral, but will include one below in case. [facility] is the closest option. However the U was included as well.     -lizett carrillo, pac

## 2020-04-09 NOTE — TELEPHONE ENCOUNTER
Spoke with pt and pt will call [facility] to make an appointment and will get records.    Norma-Referrals

## 2020-05-21 ENCOUNTER — TELEPHONE (OUTPATIENT)
Dept: FAMILY MEDICINE | Facility: CLINIC | Age: 65
End: 2020-05-21

## 2020-05-21 NOTE — TELEPHONE ENCOUNTER
Param had lab tests in March 2020    He is scheduled for May 27th with a new Nephrologist and Param would like to get new labs done before this appointment.    He would like the same labs as done in March.

## 2020-05-21 NOTE — TELEPHONE ENCOUNTER
RN placed call to patient who is requesting labs be ordered prior to visiting with new nephrologist   RN advised that the previous labs were not ordered by pcp they were ordered by [facility]   Patient states it is a funny situation   Explained to patient that a visit is needed to discuss with pcp lab orders requested patient agreeable to phone visit 5/22/2020, scheduled he declines video visit     Lizzette Mosley, Registered Nurse   [facility]

## 2020-05-22 ENCOUNTER — VIRTUAL VISIT (OUTPATIENT)
Dept: FAMILY MEDICINE | Facility: CLINIC | Age: 65
End: 2020-05-22
Payer: COMMERCIAL

## 2020-05-22 DIAGNOSIS — N04.20 NEPHROTIC SYNDROME WITH MEMBRANOUS GLOMERULONEPHRITIS: Primary | ICD-10-CM

## 2020-05-22 DIAGNOSIS — D84.9 IMMUNODEFICIENCY, UNSPECIFIED (H): ICD-10-CM

## 2020-05-22 PROCEDURE — 99214 OFFICE O/P EST MOD 30 MIN: CPT | Mod: TEL | Performed by: PHYSICIAN ASSISTANT

## 2020-05-22 NOTE — PROGRESS NOTES
"Harley Alvarado is a 64 year old male who is being evaluated via a billable telephone visit.      The patient has been notified of following:     \"This telephone visit will be conducted via a call between you and your physician/provider. We have found that certain health care needs can be provided without the need for a physical exam.  This service lets us provide the care you need with a short phone conversation.  If a prescription is necessary we can send it directly to your pharmacy.  If lab work is needed we can place an order for that and you can then stop by our lab to have the test done at a later time.    Telephone visits are billed at different rates depending on your insurance coverage. During this emergency period, for some insurers they may be billed the same as an in-person visit.  Please reach out to your insurance provider with any questions.    If during the course of the call the physician/provider feels a telephone visit is not appropriate, you will not be charged for this service.\"    Patient has given verbal consent for Telephone visit?  Yes    What phone number would you like to be contacted at? [phone]    How would you like to obtain your AVS? Zina Collado     Harley Alvarado is a 64 year old male who presents via phone visit today for the following health issues:    HPI  Concern - Lab orders  Pt says he is having appointment with Nephrologist on 05/27/2020 wants to labs done before he see the doc. Patient is wondering if his routine labs through [facility] can be ordered by me for his upcoming appt with them so the results can be discussed at visit. Given covid-19 concerns, it is difficult for patient to be tested through [facility] currently. Patient denies any covid-19 symptoms. No fever, chills, or cough. No known exposures to covid-19. However, wife works at [employer] and manages covid-19 patients directly. She has no symptoms. Patient was tested for covid in march and was " negative.       Patient Active Problem List   Diagnosis     Allergic conjunctivitis     Dyslipidemia     GERD (gastroesophageal reflux disease)     Latent tuberculosis     Major depression, recurrent (H)     Membranous glomerulonephritis     Nephropathy     Pulmonary embolism (H)     Benign essential hypertension     Dysplastic nevi     Chronic anticoagulation     Long-term (current) use of anticoagulants [Z79.01]     History of dysplastic nevus     Chronic right shoulder pain     CKD (chronic kidney disease) stage 3, GFR 30-59 ml/min (H)     Immunodeficiency, unspecified (H)     Nephrotic syndrome with membranous glomerulonephritis     Past Surgical History:   Procedure Laterality Date     HERNIA REPAIR  2015       Social History     Tobacco Use     Smoking status: Former Smoker     Smokeless tobacco: Former User     Quit date: 1974   Substance Use Topics     Alcohol use: Yes     Comment: occasional     Family History   Problem Relation Age of Onset     Alzheimer Disease Mother      Heart Disease Father          Current Outpatient Medications   Medication Sig Dispense Refill     cholecalciferol (VITAMIN D3) 1000 units (25 mcg) capsule Take 1 capsule by mouth daily       lisinopril (PRINIVIL/ZESTRIL) 5 MG tablet Take 1 tablet (5 mg) by mouth daily 90 tablet      Allergies   Allergen Reactions     Dapsone Other (See Comments)     Anemia     Recent Labs   Lab Test 12/19/19  1036 10/02/17  0759  06/09/14   * 82  --  80   HDL 45 44  --  46   TRIG 92 119  --  151   ALT  --  28  --   --    CR 1.21 1.38*   < > 1.4*   GFRESTIMATED 62 52*   < > 52*   GFRESTBLACK 72 63   < > >60   POTASSIUM 4.3 4.2   < >  --     < > = values in this interval not displayed.      BP Readings from Last 3 Encounters:   01/22/20 117/77   12/19/19 126/83   11/22/19 117/75    Wt Readings from Last 3 Encounters:   01/22/20 73.3 kg (161 lb 9.6 oz)   12/19/19 70.8 kg (156 lb)   11/22/19 70.3 kg (154 lb 14.4 oz)                    Reviewed and  updated as needed this visit by Provider         Review of Systems   Constitutional, HEENT, cardiovascular, pulmonary, gi and gu systems are negative, except as otherwise noted.       Objective   Reported vitals:  There were no vitals taken for this visit.   healthy, alert and no distress  PSYCH: Alert and oriented times 3; coherent speech, normal   rate and volume, able to articulate logical thoughts, able   to abstract reason, no tangential thoughts, no hallucinations   or delusions  His affect is normal  RESP: No cough, no audible wheezing, able to talk in full sentences  Remainder of exam unable to be completed due to telephone visits    Diagnostic Test Results:  Labs reviewed in Epic        Assessment/Plan:  1. Nephrotic syndrome with membranous glomerulonephritis  Followed by [facility]. Needing tests ordered. Am consulting with lab to see if certain tests can be ordered by us. Will follow up with patient if needed. Given covid-19, this will be done one time. However, future labs needed through [facility] or at very least outside orders through [facility].   - Albumin Random Urine Quantitative with Creat Ratio; Future  - Protein  random urine with Creat Ratio; Future  - *UA reflex to Microscopic and Culture ([facility] (except [place] and [place]); Future  - Protein timed urine with Creat Ratio; Future  - **Basic metabolic panel FUTURE anytime; Future  - CBC with platelets and differential; Future    No follow-ups on file.      Phone call duration:  12 minutes    Cory Acosta PA-C

## 2020-05-26 DIAGNOSIS — N04.20 NEPHROTIC SYNDROME WITH MEMBRANOUS GLOMERULONEPHRITIS: ICD-10-CM

## 2020-05-26 LAB
ALBUMIN UR-MCNC: NEGATIVE MG/DL
ANION GAP SERPL CALCULATED.3IONS-SCNC: 6 MMOL/L (ref 3–14)
APPEARANCE UR: CLEAR
BASOPHILS # BLD AUTO: 0.1 10E9/L (ref 0–0.2)
BASOPHILS NFR BLD AUTO: 1 %
BILIRUB UR QL STRIP: NEGATIVE
BUN SERPL-MCNC: 20 MG/DL (ref 7–30)
CALCIUM SERPL-MCNC: 9 MG/DL (ref 8.5–10.1)
CHLORIDE SERPL-SCNC: 107 MMOL/L (ref 94–109)
CO2 SERPL-SCNC: 24 MMOL/L (ref 20–32)
COLOR UR AUTO: YELLOW
CREAT SERPL-MCNC: 1.21 MG/DL (ref 0.66–1.25)
CREAT UR-MCNC: 70 MG/DL
DIFFERENTIAL METHOD BLD: ABNORMAL
EOSINOPHIL # BLD AUTO: 0.6 10E9/L (ref 0–0.7)
EOSINOPHIL NFR BLD AUTO: 6.5 %
ERYTHROCYTE [DISTWIDTH] IN BLOOD BY AUTOMATED COUNT: 11.9 % (ref 10–15)
GFR SERPL CREATININE-BSD FRML MDRD: 62 ML/MIN/{1.73_M2}
GLUCOSE SERPL-MCNC: 97 MG/DL (ref 70–99)
GLUCOSE UR STRIP-MCNC: NEGATIVE MG/DL
HCT VFR BLD AUTO: 45.1 % (ref 40–53)
HGB BLD-MCNC: 15.8 G/DL (ref 13.3–17.7)
HGB UR QL STRIP: NEGATIVE
KETONES UR STRIP-MCNC: NEGATIVE MG/DL
LEUKOCYTE ESTERASE UR QL STRIP: NEGATIVE
LYMPHOCYTES # BLD AUTO: 2.6 10E9/L (ref 0.8–5.3)
LYMPHOCYTES NFR BLD AUTO: 28 %
MCH RBC QN AUTO: 34.1 PG (ref 26.5–33)
MCHC RBC AUTO-ENTMCNC: 35 G/DL (ref 31.5–36.5)
MCV RBC AUTO: 97 FL (ref 78–100)
MICROALBUMIN UR-MCNC: 10 MG/L
MICROALBUMIN/CREAT UR: 15.04 MG/G CR (ref 0–17)
MISCELLANEOUS TEST: NORMAL
MONOCYTES # BLD AUTO: 1.3 10E9/L (ref 0–1.3)
MONOCYTES NFR BLD AUTO: 14 %
NEUTROPHILS # BLD AUTO: 4.8 10E9/L (ref 1.6–8.3)
NEUTROPHILS NFR BLD AUTO: 50.5 %
NITRATE UR QL: NEGATIVE
PH UR STRIP: 6 PH (ref 5–7)
PLATELET # BLD AUTO: 241 10E9/L (ref 150–450)
POTASSIUM SERPL-SCNC: 4 MMOL/L (ref 3.4–5.3)
PROT UR-MCNC: 0.06 G/L
PROT/CREAT 24H UR: 0.08 G/G CR (ref 0–0.2)
RBC # BLD AUTO: 4.63 10E12/L (ref 4.4–5.9)
SODIUM SERPL-SCNC: 137 MMOL/L (ref 133–144)
SOURCE: NORMAL
SP GR UR STRIP: 1.01 (ref 1–1.03)
UROBILINOGEN UR STRIP-ACNC: 0.2 EU/DL (ref 0.2–1)
WBC # BLD AUTO: 9.4 10E9/L (ref 4–11)

## 2020-05-26 PROCEDURE — 81003 URINALYSIS AUTO W/O SCOPE: CPT | Performed by: PHYSICIAN ASSISTANT

## 2020-05-26 PROCEDURE — 82043 UR ALBUMIN QUANTITATIVE: CPT | Performed by: PHYSICIAN ASSISTANT

## 2020-05-26 PROCEDURE — 99000 SPECIMEN HANDLING OFFICE-LAB: CPT | Performed by: PHYSICIAN ASSISTANT

## 2020-05-26 PROCEDURE — 86255 FLUORESCENT ANTIBODY SCREEN: CPT | Mod: 90 | Performed by: PHYSICIAN ASSISTANT

## 2020-05-26 PROCEDURE — 84156 ASSAY OF PROTEIN URINE: CPT | Performed by: PHYSICIAN ASSISTANT

## 2020-05-26 PROCEDURE — 80048 BASIC METABOLIC PNL TOTAL CA: CPT | Performed by: PHYSICIAN ASSISTANT

## 2020-05-26 PROCEDURE — 36415 COLL VENOUS BLD VENIPUNCTURE: CPT | Performed by: PHYSICIAN ASSISTANT

## 2020-05-26 PROCEDURE — 83520 IMMUNOASSAY QUANT NOS NONAB: CPT | Mod: 90 | Performed by: PHYSICIAN ASSISTANT

## 2020-05-26 PROCEDURE — 85025 COMPLETE CBC W/AUTO DIFF WBC: CPT | Performed by: PHYSICIAN ASSISTANT

## 2020-05-28 ENCOUNTER — TRANSFERRED RECORDS (OUTPATIENT)
Dept: HEALTH INFORMATION MANAGEMENT | Facility: CLINIC | Age: 65
End: 2020-05-28

## 2020-05-28 ENCOUNTER — EXTERNAL ORDER RESULTS (OUTPATIENT)
Dept: LAB | Facility: CLINIC | Age: 65
End: 2020-05-28

## 2020-05-28 DIAGNOSIS — N18.30 CHRONIC RENAL DISEASE, STAGE III (H): Primary | ICD-10-CM

## 2020-05-28 DIAGNOSIS — N05.9 GLOMERULONEPHRITIS: ICD-10-CM

## 2020-05-29 LAB
RESULT: NORMAL
SEND OUTS MISC TEST CODE: NORMAL
SEND OUTS MISC TEST SPECIMEN: NORMAL
TEST NAME: NORMAL

## 2020-08-21 DIAGNOSIS — N18.30 CHRONIC RENAL DISEASE, STAGE III (H): Primary | ICD-10-CM

## 2020-08-21 DIAGNOSIS — N05.9 GLOMERULONEPHRITIS: ICD-10-CM

## 2020-08-27 ENCOUNTER — HOSPITAL ENCOUNTER (EMERGENCY)
Facility: CLINIC | Age: 65
Discharge: HOME | End: 2020-08-27
Attending: NURSE PRACTITIONER | Admitting: NURSE PRACTITIONER
Payer: COMMERCIAL

## 2020-08-27 ENCOUNTER — APPOINTMENT (OUTPATIENT)
Dept: MRI IMAGING | Facility: CLINIC | Age: 65
End: 2020-08-27
Attending: NURSE PRACTITIONER
Payer: COMMERCIAL

## 2020-08-27 VITALS
DIASTOLIC BLOOD PRESSURE: 96 MMHG | RESPIRATION RATE: 28 BRPM | HEART RATE: 58 BPM | SYSTOLIC BLOOD PRESSURE: 143 MMHG | TEMPERATURE: 97.7 F | OXYGEN SATURATION: 96 %

## 2020-08-27 DIAGNOSIS — R51.9 HEADACHE: ICD-10-CM

## 2020-08-27 DIAGNOSIS — R20.2 PARESTHESIA: ICD-10-CM

## 2020-08-27 LAB
ANION GAP SERPL CALCULATED.3IONS-SCNC: 5 MMOL/L (ref 3–14)
BUN SERPL-MCNC: 22 MG/DL (ref 7–30)
CALCIUM SERPL-MCNC: 8.5 MG/DL (ref 8.5–10.1)
CHLORIDE SERPL-SCNC: 102 MMOL/L (ref 94–109)
CO2 SERPL-SCNC: 28 MMOL/L (ref 20–32)
CREAT SERPL-MCNC: 1.33 MG/DL (ref 0.66–1.25)
ERYTHROCYTE [DISTWIDTH] IN BLOOD BY AUTOMATED COUNT: 11.6 % (ref 10–15)
GFR SERPL CREATININE-BSD FRML MDRD: 55 ML/MIN/{1.73_M2}
GLUCOSE BLDC GLUCOMTR-MCNC: 82 MG/DL (ref 70–99)
GLUCOSE SERPL-MCNC: 90 MG/DL (ref 70–99)
HCT VFR BLD AUTO: 42.8 % (ref 40–53)
HGB BLD-MCNC: 14.5 G/DL (ref 13.3–17.7)
MCH RBC QN AUTO: 33.6 PG (ref 26.5–33)
MCHC RBC AUTO-ENTMCNC: 33.9 G/DL (ref 31.5–36.5)
MCV RBC AUTO: 99 FL (ref 78–100)
PLATELET # BLD AUTO: 243 10E9/L (ref 150–450)
POTASSIUM SERPL-SCNC: 4.1 MMOL/L (ref 3.4–5.3)
RBC # BLD AUTO: 4.32 10E12/L (ref 4.4–5.9)
SODIUM SERPL-SCNC: 135 MMOL/L (ref 133–144)
TROPONIN I SERPL-MCNC: <0.015 UG/L (ref 0–0.04)
WBC # BLD AUTO: 13.6 10E9/L (ref 4–11)

## 2020-08-27 PROCEDURE — 80048 BASIC METABOLIC PNL TOTAL CA: CPT | Performed by: NURSE PRACTITIONER

## 2020-08-27 PROCEDURE — 00000146 ZZHCL STATISTIC GLUCOSE BY METER IP

## 2020-08-27 PROCEDURE — 85027 COMPLETE CBC AUTOMATED: CPT | Performed by: NURSE PRACTITIONER

## 2020-08-27 PROCEDURE — 84484 ASSAY OF TROPONIN QUANT: CPT | Performed by: NURSE PRACTITIONER

## 2020-08-27 PROCEDURE — 70551 MRI BRAIN STEM W/O DYE: CPT

## 2020-08-27 PROCEDURE — 99285 EMERGENCY DEPT VISIT HI MDM: CPT | Mod: 25

## 2020-08-27 PROCEDURE — 93005 ELECTROCARDIOGRAM TRACING: CPT

## 2020-08-27 ASSESSMENT — ENCOUNTER SYMPTOMS
DYSURIA: 0
NAUSEA: 0
HEADACHES: 1
CHILLS: 0
FEVER: 0
WEAKNESS: 0
ABDOMINAL PAIN: 0
SHORTNESS OF BREATH: 0
NUMBNESS: 1
VOMITING: 0

## 2020-08-27 NOTE — ED PROVIDER NOTES
"  History     Chief Complaint:  Jaw Pain; Headache       The history is provided by the patient.      Harley Alvarado is a 64 year old male who presents for evaluation of jaw pain and a headache. The patient states that 2 days ago he began to experience lower right jaw pain. He originally thought this was tooth pain and was seen by a dentist who found no specific cause for his pain and referred the patient to a specialist for a possible root canal. The patient has treated with ibuprofen yesterday evening and this morning with no relief of his jaw pain. Today, around two hours prior to arrival, he then began to experience a headache and then bottom right lip numbness. He treated with two ibuprofen again at that time and notes that his headache has improved. He also notes that his right 4th and 5th fingers began to feel \"tingly\" while he was in the lobby waiting here and using his iPad. He states that this has since resolved.     Allergies:  Dapsone     Medications:    lisinopril    Past Medical History:    Hypertension  Dyslipidemia  GERD  Latent tuberculosis  Depression  Membranous glomerulonephritis   Nephropathy   Pulmonary embolism     Past Surgical History:    Hernia repair     Family History:    Alzheimer Disease   Heart Disease      Social History:  The patient presents to the ED alone.  Smoking Status: Former Smoker, quit 1974  Smokeless Tobacco: Former User, quit 1974  Alcohol Use: Yes  Drug Use: No  PCP: Cory Acosta     Review of Systems   Constitutional: Negative for chills and fever.   HENT:        (+) Jaw pain   Respiratory: Negative for shortness of breath.    Cardiovascular: Negative for chest pain.   Gastrointestinal: Negative for abdominal pain, nausea and vomiting.   Genitourinary: Negative for dysuria.   Neurological: Positive for numbness (see HPI) and headaches. Negative for weakness.   All other systems reviewed and are negative.      Physical Exam     Patient Vitals for the past 24 " hrs:   BP Temp Pulse Resp SpO2   08/27/20 1915 (!) 143/96 -- 58 28 96 %   08/27/20 1900 130/86 -- 57 18 94 %   08/27/20 1727 (!) 139/98 97.7  F (36.5  C) 62 20 98 %       Physical Exam  General:  Alert, No obvious discomfort, well kept  HEENT:  Normal Voice, PERRL, EOM normal, oropharynx is normal, Uvula is midline, Conjunctivae and sclerae are normal, No lymphadenopathy    Neck: Normal range of motion, No meningismus. There is no midline cervical spine pain/tenderness. No mass is detected  CV:  Regular rate and underlying rhythm, Normal Peripheral pulses. No murmurs, rubs or clicks  Resp: Lungs are clear, No tachypnea, Non-labored breathing, No wheezing, crackles, or rales   GI:  Abdomen is soft, there is no rigidity, No distension, No tympani, No rebound tenderness, Non-surgical without peritoneal features    MS:  No major joint effusions, No asymmetric leg swelling. No calf tenderness  Skin:  No rash or acute skin lesions noted, Warm, Dry  Neuro:    National Institutes [employer] Stroke Scale  Exam Interval: Baseline   Score    Level of consciousness: (0)   Alert, keenly responsive    LOC questions: (0)   Answers both questions correctly    LOC commands: (0)   Performs both tasks correctly    Best gaze: (0)   Normal    Visual: (0)   No visual loss    Facial palsy: (0)   Normal symmetrical movements    Motor arm (left): (0)   No drift    Motor arm (right): (0)   No drift    Motor leg (left): (0)   No drift    Motor leg (right): (0)   No drift    Limb ataxia: (0)   Absent    Sensory: (0)   Normal- no sensory loss    Best language: (0)   Normal- no aphasia    Dysarthria: (0)   Normal    Extinction and inattention: (0)   No abnormality        Total Score:  0   Psych:  Awake. Alert.  Normal affect.  Appropriate interactions. Good eye contact      Emergency Department Course     ECG:  Indication: Headache  Time: 1738  Vent. Rate 62 bpm. WI interval 158. QRS duration 82. QT/QTc 392/397. P-R-T axis 51 14 31. Normal sinus  rhythm. Normal ECG.  Read time: 1749 by Dr. Woods    Imaging:  Radiology findings were communicated with the patient who voiced understanding of the findings.    MR Brain with/o & with contrast:  1.  No acute intracranial process.  2.  Generalized brain atrophy and presumed microvascular ischemic changes as detailed above.  As per radiology.     Laboratory:  Laboratory findings were communicated with the patient who voiced understanding of the findings.    CBC: WBC: 13.6 (high), HGB: 14.5, PLT: 243    BMP: Creatinine 1.33 (high), GFR 55 (low) o/w WNL     1732 Glucose by meter: 82 (WNL)    1735 Troponin: <0.015     Emergency Department Course:  Past medical records, nursing notes, and vitals reviewed.    1740 I performed an exam of the patient as documented above.     EKG obtained in the ED, see results above.     IV was inserted and blood was drawn for laboratory testing, results above.    The patient was sent for a MRI while in the emergency department, results above.     2025 I rechecked the patient and discussed the results of his workup thus far. At this point I feel that the patient is safe for discharge, and the patient agrees.     Findings and plan explained to the patietn. Patient discharged home with instructions regarding supportive care, medications, and reasons to return. The importance of close follow-up was reviewed.     I personally reviewed the laboratory and imaging results with the patient and answered all related questions prior to discharge.     Impression & Plan     Medical Decision Making:  Harley Alvarado is a 64 year old male who presents today for evaluation of right-sided lip numbness.  He states this is on the same side that he has been told that he needs a root canal on.  He became concerned for a CVA as his numbness included a headache.  He therefore presented for evaluation.  While in the [facility] he described ulnar nerve distribution paresthesias that resolved since he stopped using  his iPad I suspect this was positional.  His head to toe evaluation showed no focal neurologic deficits.  I did obtain an MRI that showed no acute intracranial finding.  It is likely that his paresthesia in his face was caused by his headache or possibly by his tooth.  This again had resolved by the time I evaluated him.  TIA is not fully ruled out at this point.  He has a ABCD score of 1.  This puts him at extremely low risk.  He is safe and appropriate for outpatient management and follow-up.  I have given him information for neurology to follow-up.  Return protocols were discussed.  He is discharged home.      Diagnosis:    ICD-10-CM    1. Headache  R51    2. Paresthesia  R20.2        Disposition:  Discharged to home.    Scribe Disclosure:  I, Pedro Lo, am serving as a scribe at 5:30 PM on 8/27/2020 to document services personally performed by Dale Egan NP based on my observations and the provider's statements to me.      Dale Egan, MICH CNP  08/27/20 2142

## 2020-08-27 NOTE — ED TRIAGE NOTES
Right hand numbness x 1 hour, right lip numbness and headache started at 1500. Jaw pain yesterday. Headache today. Patient states he saw a dentist yesterday for the jaw pain. ABC intact alert and no distress.

## 2020-08-27 NOTE — ED AVS SNAPSHOT
[facility] Emergency Department  [address]  [facility] [address]  Phone:  [phone]  Fax:  [phone]                                    Harley Alvarado   MRN: [id number]    Department:  [facility] Emergency Department   Date of Visit:  8/27/2020           After Visit Summary Signature Page    I have received my discharge instructions, and my questions have been answered. I have discussed any challenges I see with this plan with the nurse or doctor.    ..........................................................................................................................................  Patient/Patient Representative Signature      ..........................................................................................................................................  Patient Representative Print Name and Relationship to Patient    ..................................................               ................................................  Date                                   Time    ..........................................................................................................................................  Reviewed by Signature/Title    ...................................................              ..............................................  Date                                               Time          22EPIC Rev 08/18

## 2020-08-28 ENCOUNTER — OFFICE VISIT (OUTPATIENT)
Dept: URGENT CARE | Facility: URGENT CARE | Age: 65
End: 2020-08-28
Payer: COMMERCIAL

## 2020-08-28 VITALS
OXYGEN SATURATION: 97 % | DIASTOLIC BLOOD PRESSURE: 74 MMHG | WEIGHT: 161 LBS | RESPIRATION RATE: 16 BRPM | HEART RATE: 65 BPM | SYSTOLIC BLOOD PRESSURE: 138 MMHG | TEMPERATURE: 98.6 F | BODY MASS INDEX: 26.37 KG/M2

## 2020-08-28 DIAGNOSIS — K08.89 PAIN, DENTAL: Primary | ICD-10-CM

## 2020-08-28 LAB — INTERPRETATION ECG - MUSE: NORMAL

## 2020-08-28 PROCEDURE — 99213 OFFICE O/P EST LOW 20 MIN: CPT | Performed by: FAMILY MEDICINE

## 2020-08-28 RX ORDER — TRAMADOL HYDROCHLORIDE 50 MG/1
50 TABLET ORAL EVERY 6 HOURS PRN
Qty: 15 TABLET | Refills: 0 | Status: SHIPPED | OUTPATIENT
Start: 2020-08-28 | End: 2021-01-20

## 2020-08-28 NOTE — DISCHARGE INSTRUCTIONS
Again it is possible that this was a TIA, but MRI shows no indication for Stroke.  You should still follow up with Neurology. Take an Asprin daily if not already taking.

## 2020-08-29 NOTE — PROGRESS NOTES
CHIEF COMPLAINT    Pain in a lower R tooth      HISTORY    He has persistent pain. Dentist rec Ibu but he has decr renal fcn. No facial swelling. No fever.    Was in E R - no further numbness    No longer on warfarin. Takes ASA.    Patient Active Problem List   Diagnosis     Allergic conjunctivitis     Dyslipidemia     GERD (gastroesophageal reflux disease)     Latent tuberculosis     Major depression, recurrent (H)     Membranous glomerulonephritis     Nephropathy     Pulmonary embolism (H)     Benign essential hypertension     Dysplastic nevi     Chronic anticoagulation     Long-term (current) use of anticoagulants [Z79.01]     History of dysplastic nevus     Chronic right shoulder pain     CKD (chronic kidney disease) stage 3, GFR 30-59 ml/min (H)     Immunodeficiency, unspecified (H)     Nephrotic syndrome with membranous glomerulonephritis         REVIEW OF SYSTEMS    No fever  No SOB  No CP  No nausea  No focal weakness      Past Medical History:   Diagnosis Date     Allergic conjunctivitis 4/13/2015     Benign essential hypertension 9/29/2017     Dyslipidemia 9/29/2017    Overview:  Created by Conversion     GERD (gastroesophageal reflux disease) 2/6/2016     Latent tuberculosis 9/29/2017    Overview:  Created by Conversion     Major depression, recurrent (H) 9/29/2017    Overview:  Created by Conversion  Replacement Utility updated for latest IMO load     Membranous glomerulonephritis 9/29/2017    Overview:  Created by Conversion [facility] Annotation: Aug 26 2011  5:18PM - Angely Pat: Stage 1, s/p biopsy  8/2011  Replacement Utility updated for latest IMO load     Nephropathy 12/30/2014     Pulmonary embolism (H) 12/30/2014         EXAM  /74   Pulse 65   Temp 98.6  F (37  C) (Oral)   Resp 16   Wt 73 kg (161 lb)   SpO2 97%   BMI 26.37 kg/m      Alert.  Normocephalic.  No face swelling  Tender over lower R tooth  Neck no adenopathy or mass      (K08.89) Pain, dental  (primary encounter  diagnosis)  Comment:   Plan: amoxicillin-clavulanate (AUGMENTIN) 875-125 MG         tablet, traMADol (ULTRAM) 50 MG tablet          He is to consider dental F/U.

## 2020-08-29 NOTE — PATIENT INSTRUCTIONS
Take prescribed medication (antibiotic) as directed.    You may take 1 pain pill plus 2 Tylenol ES every 6 hours.

## 2021-01-15 ENCOUNTER — HEALTH MAINTENANCE LETTER (OUTPATIENT)
Age: 66
End: 2021-01-15

## 2021-01-20 ENCOUNTER — OFFICE VISIT (OUTPATIENT)
Dept: FAMILY MEDICINE | Facility: CLINIC | Age: 66
End: 2021-01-20
Payer: MEDICARE

## 2021-01-20 VITALS
TEMPERATURE: 97.3 F | HEIGHT: 66 IN | BODY MASS INDEX: 24.19 KG/M2 | HEART RATE: 57 BPM | RESPIRATION RATE: 15 BRPM | SYSTOLIC BLOOD PRESSURE: 134 MMHG | DIASTOLIC BLOOD PRESSURE: 87 MMHG | OXYGEN SATURATION: 97 % | WEIGHT: 150.5 LBS

## 2021-01-20 DIAGNOSIS — R21 RASH AND NONSPECIFIC SKIN ERUPTION: Primary | ICD-10-CM

## 2021-01-20 DIAGNOSIS — Z00.00 ENCOUNTER FOR MEDICARE ANNUAL WELLNESS EXAM: ICD-10-CM

## 2021-01-20 DIAGNOSIS — Z23 NEED FOR PROPHYLACTIC VACCINATION AND INOCULATION AGAINST INFLUENZA: ICD-10-CM

## 2021-01-20 PROCEDURE — 99213 OFFICE O/P EST LOW 20 MIN: CPT | Mod: 25 | Performed by: FAMILY MEDICINE

## 2021-01-20 PROCEDURE — 90471 IMMUNIZATION ADMIN: CPT | Performed by: FAMILY MEDICINE

## 2021-01-20 PROCEDURE — 90662 IIV NO PRSV INCREASED AG IM: CPT | Performed by: FAMILY MEDICINE

## 2021-01-20 PROCEDURE — 99397 PER PM REEVAL EST PAT 65+ YR: CPT | Mod: 25 | Performed by: FAMILY MEDICINE

## 2021-01-20 RX ORDER — MULTIVITAMIN
1 CAPSULE ORAL
COMMUNITY

## 2021-01-20 ASSESSMENT — MIFFLIN-ST. JEOR: SCORE: 1405.41

## 2021-01-20 NOTE — PATIENT INSTRUCTIONS
Take a daily antihistamine such as Claritin ( Or Allegra, or Zyrtec) for 2-4 weeks.    Topical Hydrocortisone cream alone or mixed in with another lotion 50-50 to help cover larger areas, twice per day for 7-14 days.        Patient Education   Personalized Prevention Plan  You are due for the preventive services outlined below.  Your care team is available to assist you in scheduling these services.  If you have already completed any of these items, please share that information with your care team to update in your medical record.  Health Maintenance Due   Topic Date Due     URINE DRUG SCREEN  1955     Depression Action Plan  1955     Colorectal Cancer Screening  12/07/1964     Zoster (Shingles) Vaccine (1 of 2) 12/07/2004     Pneumococcal Vaccine (3 of 3 - PPSV23) 02/13/2020     Flu Vaccine (1) 09/01/2020     ANNUAL REVIEW OF HM ORDERS  11/22/2020     Annual Wellness Visit  12/19/2020     FALL RISK ASSESSMENT  12/19/2020     Pneumococcal Vaccine (2 of 2 - PPSV23) 12/19/2020     Depression Assessment  01/22/2021        Patient Education   Personalized Prevention Plan  You are due for the preventive services outlined below.  Your care team is available to assist you in scheduling these services.  If you have already completed any of these items, please share that information with your care team to update in your medical record.  Health Maintenance Due   Topic Date Due     URINE DRUG SCREEN  1955     Depression Action Plan  1955     Colorectal Cancer Screening  12/07/1964     Zoster (Shingles) Vaccine (1 of 2) 12/07/2004     Pneumococcal Vaccine (3 of 3 - PPSV23) 02/13/2020     Flu Vaccine (1) 09/01/2020     ANNUAL REVIEW OF HM ORDERS  11/22/2020     Annual Wellness Visit  12/19/2020     FALL RISK ASSESSMENT  12/19/2020     Pneumococcal Vaccine (2 of 2 - PPSV23) 12/19/2020     Depression Assessment  01/22/2021        Patient Education   Personalized Prevention Plan  You are due for the  preventive services outlined below.  Your care team is available to assist you in scheduling these services.  If you have already completed any of these items, please share that information with your care team to update in your medical record.  Health Maintenance Due   Topic Date Due     URINE DRUG SCREEN  1955     Depression Action Plan  1955     Colorectal Cancer Screening  12/07/1964     Zoster (Shingles) Vaccine (1 of 2) 12/07/2004     Pneumococcal Vaccine (3 of 3 - PPSV23) 02/13/2020     Flu Vaccine (1) 09/01/2020     ANNUAL REVIEW OF HM ORDERS  11/22/2020     FALL RISK ASSESSMENT  12/19/2020     Pneumococcal Vaccine (2 of 2 - PPSV23) 12/19/2020     Depression Assessment  01/22/2021        Patient Education     Loratadine capsules or tablets  Brand Names: Alavert, Allergy Relief, Claritin, Claritin Liqui-Gel, Claritin-D 24 Hour, Clear-Atadine, QlearQuil All Day & All Night Allergy Relief, Tavist ND  What is this medicine?  LORATADINE (harriet AT a ritu) is an antihistamine. It helps to relieve sneezing, runny nose, and itchy, watery eyes. This medicine is used to treat the symptoms of allergies. It is also used to treat itchy skin rash and hives.  How should I use this medicine?  Take this medicine by mouth with a glass of water. Follow the directions on the label. You may take this medicine with food or on an empty stomach. Take your medicine at regular intervals. Do not take your medicine more often than directed.  Talk to your pediatrician regarding the use of this medicine in children. While this medicine may be used in children as young as 6 years for selected conditions, precautions do apply.  What side effects may I notice from receiving this medicine?  Side effects that you should report to your doctor or health care professional as soon as possible:    allergic reactions like skin rash, itching or hives, swelling of the face, lips, or tongue    breathing problems    unusually restless or  nervous  Side effects that usually do not require medical attention (report to your doctor or health care professional if they continue or are bothersome):    drowsiness    dry or irritated mouth or throat    headache  What may interact with this medicine?    other medicines for colds or allergies    What if I miss a dose?  If you miss a dose, take it as soon as you can. If it is almost time for your next dose, take only that dose. Do not take double or extra doses.  Where should I keep my medicine?  Keep out of the reach of children.  Store at room temperature between 2 and 30 degrees C (36 and 86 degrees F). Protect from moisture. Throw away any unused medicine after the expiration date.  What should I tell my health care provider before I take this medicine?  They need to know if you have any of these conditions:    asthma    kidney disease    liver disease    an unusual or allergic reaction to loratadine, other antihistamines, other medicines, foods, dyes, or preservatives    pregnant or trying to get pregnant    breast-feeding  What should I watch for while using this medicine?  Tell your doctor or healthcare professional if your symptoms do not start to get better or if they get worse.  Your mouth may get dry. Chewing sugarless gum or sucking hard candy, and drinking plenty of water may help. Contact your doctor if the problem does not go away or is severe.  You may get drowsy or dizzy. Do not drive, use machinery, or do anything that needs mental alertness until you know how this medicine affects you. Do not stand or sit up quickly, especially if you are an older patient. This reduces the risk of dizzy or fainting spells.  NOTE:This sheet is a summary. It may not cover all possible information. If you have questions about this medicine, talk to your doctor, pharmacist, or health care provider. Copyright  2020 [employer]

## 2021-01-20 NOTE — PROGRESS NOTES
"  SUBJECTIVE:   Harley Alvarado is a 65 year old male who presents for Preventive Visit.      Patient has been advised of split billing requirements and indicates understanding: Yes  Are you in the first 12 months of your Medicare Part B coverage?  No    Physical Health:    In general, how would you rate your overall physical health? good    Outside of work, how many days during the week do you exercise? 4-5 days/week    Outside of work, approximately how many minutes a day do you exercise?15-30 minutes    If you drink alcohol do you typically have >3 drinks per day or >7 drinks per week? No    Do you usually eat at least 4 servings of fruit and vegetables a day, include whole grains & fiber and avoid regularly eating high fat or \"junk\" foods? Yes    Do you have any problems taking medications regularly?  No    Do you have any side effects from medications? none    Needs assistance for the following daily activities: no assistance needed    Which of the following safety concerns are present in your home?  none identified     Hearing impairment: No    In the past 6 months, have you been bothered by leaking of urine? no    Mental Health:    In general, how would you rate your overall mental or emotional health? good  PHQ-2 Score:      Do you feel safe in your environment? Yes    Have you ever done Advance Care Planning? (For example, a Health Directive, POLST, or a discussion with a medical provider or your loved ones about your wishes): No, advance care planning information given to patient to review.  Advanced care planning was discussed at today's visit.    Additional concerns to address?  YES    Fall risk:  Fallen 2 or more times in the past year?: No  Any fall with injury in the past year?: No    Cognitive Screenin) Repeat 3 items (Leader, Season, Table)    2) Clock draw: NORMAL  3) 3 item recall: Recalls 3 objects  Results: 3 items recalled: COGNITIVE IMPAIRMENT LESS LIKELY    Mini-CogTM Copyright S " K[place]. Licensed by the author for use in [facility]; reprinted with permission ([email or web address]). All rights reserved.      Do you have sleep apnea, excessive snoring or daytime drowsiness?: yes        Rash  Onset: 3 weeks     Description:   Location: chest and back  Character: round, raised, red  Itching (Pruritis): YES    Progression of Symptoms:  same    Accompanying Signs & Symptoms:  Fever: no   Body aches or joint pain: no   Sore throat symptoms: no   Recent cold symptoms: no     History:   Previous similar rash: YES- Maybe    Precipitating factors:   Exposure to similar rash: no   New exposures: None   Recent travel: no     Alleviating factors:  None    Therapies Tried and outcome: Eucerin.    Unsure where it started first, but has had some spots here and there on chest, seems to have progressed to upper back and now down towards lower back.  Rash is very itchy.  No change in detergents, soaps, or shampoos.  Starting to get spots on legs.  Tried Eucerin, thought was just dry skin.  Tried taking some benadryl, 25 mg, one time, did not seem to do anything.  No new medications or foods.  Did take higher dose Vitamin D3, 5000 international unit(s), but stopped and had no change in rash.  Otherwise nothing new that he can think of.      Reviewed and updated as needed this visit by clinical staff  Tobacco  Allergies       Soc Hx        Reviewed and updated as needed this visit by Provider   Allergies              Social History     Tobacco Use     Smoking status: Former Smoker     Smokeless tobacco: Former User     Quit date: 1974   Substance Use Topics     Alcohol use: Yes     Comment: occasional                           Current providers sharing in care for this patient include:   Patient Care Team:  Cory Acosta PA-C as PCP - General (Physician Assistant)  Cory Acosta PA-C as Assigned PCP    The following health maintenance items are reviewed in Epic and correct as of  "today:  Health Maintenance   Topic Date Due     URINE DRUG SCREEN  1955     DEPRESSION ACTION PLAN  1955     COLORECTAL CANCER SCREENING  12/07/1964     ZOSTER IMMUNIZATION (1 of 2) 12/07/2004     Pneumococcal Vaccine: Pediatrics (0 to 5 Years) and At-Risk Patients (6 to 64 Years) (3 of 3 - PPSV23) 02/13/2020     ANNUAL REVIEW OF HM ORDERS  11/22/2020     FALL RISK ASSESSMENT  12/19/2020     Pneumococcal Vaccine: 65+ Years (2 of 2 - PPSV23) 12/19/2020     PHQ-9  01/22/2021     MEDICARE ANNUAL WELLNESS VISIT  01/20/2022     LIPID  12/19/2024     ADVANCE CARE PLANNING  01/20/2026     DTAP/TDAP/TD IMMUNIZATION (3 - Td) 09/29/2027     HEPATITIS C SCREENING  Completed     INFLUENZA VACCINE  Completed     AORTIC ANEURYSM SCREENING (SYSTEM ASSIGNED)  Completed     IPV IMMUNIZATION  Aged Out     MENINGITIS IMMUNIZATION  Aged Out     HEPATITIS B IMMUNIZATION  Aged Out     BP Readings from Last 3 Encounters:   01/20/21 134/87   08/28/20 138/74   08/27/20 (!) 143/96    Wt Readings from Last 3 Encounters:   01/20/21 68.3 kg (150 lb 8 oz)   08/28/20 73 kg (161 lb)   01/22/20 73.3 kg (161 lb 9.6 oz)                  Current Outpatient Medications   Medication Sig Dispense Refill     cholecalciferol (VITAMIN D3) 1000 units (25 mcg) capsule Take 1 capsule by mouth daily       lisinopril (PRINIVIL/ZESTRIL) 5 MG tablet Take 1 tablet (5 mg) by mouth daily 90 tablet      multivitamin (DEKAS ESSENTIAL) capsule Take 1 capsule by mouth       Allergies   Allergen Reactions     Dapsone Other (See Comments)     Anemia     Received influenza vaccine today, no additional vaccines.    ROS:  Constitutional, HEENT, cardiovascular, pulmonary, gi and gu systems are negative, except as otherwise noted.    OBJECTIVE:   /87 (BP Location: Right arm, Patient Position: Chair, Cuff Size: Adult Regular)   Pulse 57   Temp 97.3  F (36.3  C) (Oral)   Resp 15   Ht 1.676 m (5' 6\")   Wt 68.3 kg (150 lb 8 oz)   SpO2 97%   BMI 24.29 kg/m   " "Estimated body mass index is 24.29 kg/m  as calculated from the following:    Height as of this encounter: 1.676 m (5' 6\").    Weight as of this encounter: 68.3 kg (150 lb 8 oz).  EXAM:   GENERAL: healthy, alert and no distress  EYES: Eyes grossly normal to inspection, PERRL and conjunctivae and sclerae normal  SKIN: Erythematous maculopapular rash across upper chest and entirety of back, nothing on abdomen.  A couple spots on   PSYCH: mentation appears normal, affect normal/bright    Diagnostic Test Results:  Labs reviewed in Epic    ASSESSMENT / PLAN:   1. Rash and nonspecific skin eruption  Discussed nonspecific nature of rash, unknown etiology.  Recommended daily antihistamine such as claritin and topical hydrocortisone PRN.  Follow up as needed.    2. Encounter for Medicare annual wellness exam  Information reviewed.  Follow up one year or sooner as needed.    3. Need for prophylactic vaccination and inoculation against influenza  - FLUZONE HIGH DOSE 65+  [03556]  - ADMIN 1st VACCINE    Patient has been advised of split billing requirements and indicates understanding: Yes    COUNSELING:  Reviewed preventive health counseling, as reflected in patient instructions    Estimated body mass index is 24.29 kg/m  as calculated from the following:    Height as of this encounter: 1.676 m (5' 6\").    Weight as of this encounter: 68.3 kg (150 lb 8 oz).    Weight management plan: Discussed healthy diet and exercise guidelines    He reports that he has quit smoking. He quit smokeless tobacco use about 47 years ago.    Appropriate preventive services were discussed with this patient, including applicable screening as appropriate for cardiovascular disease, diabetes, osteopenia/osteoporosis, and glaucoma.  As appropriate for age/gender, discussed screening for colorectal cancer, prostate cancer, breast cancer, and cervical cancer. Checklist reviewing preventive services available has been given to the patient.    Reviewed " patients plan of care and provided an AVS. The Basic Care Plan (routine screening as documented in Health Maintenance) for Harley meets the Care Plan requirement. This Care Plan has been established and reviewed with the Patient.    Counseling Resources:  ATP IV Guidelines  Pooled Cohorts Equation Calculator  Breast Cancer Risk Calculator  BRCA-Related Cancer Risk Assessment: FHS-7 Tool  FRAX Risk Assessment  ICSI Preventive Guidelines  Dietary Guidelines for Americans, 2010  USDA's MyPlate  ASA Prophylaxis  Lung CA Screening    April JACQUELINE. Norma Rowan MD  [facility]

## 2021-03-17 ENCOUNTER — OFFICE VISIT (OUTPATIENT)
Dept: FAMILY MEDICINE | Facility: OTHER | Age: 66
End: 2021-03-17
Attending: PHYSICIAN ASSISTANT
Payer: MEDICARE

## 2021-03-17 ENCOUNTER — IMMUNIZATION (OUTPATIENT)
Dept: FAMILY MEDICINE | Facility: OTHER | Age: 66
End: 2021-03-17
Attending: FAMILY MEDICINE
Payer: MEDICARE

## 2021-03-17 VITALS
HEIGHT: 66 IN | DIASTOLIC BLOOD PRESSURE: 76 MMHG | HEART RATE: 70 BPM | SYSTOLIC BLOOD PRESSURE: 132 MMHG | WEIGHT: 151.8 LBS | BODY MASS INDEX: 24.4 KG/M2 | OXYGEN SATURATION: 98 % | RESPIRATION RATE: 12 BRPM | TEMPERATURE: 96.8 F

## 2021-03-17 DIAGNOSIS — Z23 HIGH PRIORITY FOR COVID-19 VIRUS VACCINATION: ICD-10-CM

## 2021-03-17 DIAGNOSIS — S89.91XA INJURY OF RIGHT LOWER EXTREMITY, INITIAL ENCOUNTER: Primary | ICD-10-CM

## 2021-03-17 PROCEDURE — 0001A HC ADMIN COVID VAC PFIZER, 1ST DOSE: CPT

## 2021-03-17 PROCEDURE — 99214 OFFICE O/P EST MOD 30 MIN: CPT | Performed by: PHYSICIAN ASSISTANT

## 2021-03-17 PROCEDURE — 91300 HC COVID VAC PFIZER DIL RECON 30 MCG/0.3 ML IM: CPT

## 2021-03-17 PROCEDURE — G0463 HOSPITAL OUTPT CLINIC VISIT: HCPCS | Mod: 25 | Performed by: PHYSICIAN ASSISTANT

## 2021-03-17 ASSESSMENT — ANXIETY QUESTIONNAIRES
6. BECOMING EASILY ANNOYED OR IRRITABLE: NOT AT ALL
GAD7 TOTAL SCORE: 0
3. WORRYING TOO MUCH ABOUT DIFFERENT THINGS: NOT AT ALL
5. BEING SO RESTLESS THAT IT IS HARD TO SIT STILL: NOT AT ALL
IF YOU CHECKED OFF ANY PROBLEMS ON THIS QUESTIONNAIRE, HOW DIFFICULT HAVE THESE PROBLEMS MADE IT FOR YOU TO DO YOUR WORK, TAKE CARE OF THINGS AT HOME, OR GET ALONG WITH OTHER PEOPLE: NOT DIFFICULT AT ALL
2. NOT BEING ABLE TO STOP OR CONTROL WORRYING: NOT AT ALL
7. FEELING AFRAID AS IF SOMETHING AWFUL MIGHT HAPPEN: NOT AT ALL
1. FEELING NERVOUS, ANXIOUS, OR ON EDGE: NOT AT ALL

## 2021-03-17 ASSESSMENT — PATIENT HEALTH QUESTIONNAIRE - PHQ9
5. POOR APPETITE OR OVEREATING: NOT AT ALL
SUM OF ALL RESPONSES TO PHQ QUESTIONS 1-9: 0

## 2021-03-17 ASSESSMENT — MIFFLIN-ST. JEOR: SCORE: 1411.31

## 2021-03-17 ASSESSMENT — PAIN SCALES - GENERAL: PAINLEVEL: NO PAIN (1)

## 2021-03-17 NOTE — PROGRESS NOTES
Assessment & Plan     1. Injury of right lower extremity, initial encounter  Soft tissue injury that is healing well. Instructed on different stretches and exercises to help that area. Encouraged icing or heating as needed. Follow up as needed.     2. High priority for COVID-19 virus vaccination  Discussed with patient that given his age and past medical history including being immunocompromised I would recommend he proceed forward with obtaining COVID vaccine. Discussed benefits vs. Risks. Recommend discussing with vaccine administor if able to receive first dose given he did have an infusion at the [facility] on 3/13 and it has not been 14 days since then. Strongly recommend he be available to receive the second dose in 21 days. All questions answered.     No follow-ups on file.    Sejal Santillan PA-C  [facility]    Tobias Virgen is a 65 year old who presents for the following health issues     HPI   Here for evaluation of multiple concerns.    Leg:  On 2/4 patient was walking down a large hill and he stumbled. Right lateral calf area was struck and developed some pain and swelling. Initially treated with ice then transitioned to heat. Symptoms have improved however he would like to discuss possible stretches and strengthening exercises as he feels like healing has stalled at this time. Able to bear weight and ambulate. Noticing symptoms mostly when walking down stairs. Has not taken anything for symptomatic management at home. No longer swollen, no overlying skin changes, weakness, numbness or tingling.     COVID vaccine:  Has questions on if he should be receiving the COVID vaccine at this time. Has an appointment to have first Pfizer vaccine through [facility] this morning. Hx of glomerulonephritis for which he receives treatment at [facility]. Underwent most recent infusion on 3/13. Scheduled to follow up in 3 months. He is questioning if he is safe to receive the vaccine at this time as well as  "how he should proceed going forward as he may not be in this area come time for his second dose.       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    Overview:  Created by Conversion [facility] Annotation: Aug 26 2011  5:18PM - Angely Pat: Stage 1, s/p biopsy  8/2011  Replacement Utility updated for latest IMO load     Nephropathy 12/30/2014     Pulmonary embolism (H) 12/30/2014       PAST SURGICAL HISTORY:   Past Surgical History:   Procedure Laterality Date     HERNIA REPAIR  2015       FAMILY HISTORY:   Family History   Problem Relation Age of Onset     Alzheimer Disease Mother      Heart Disease Father        SOCIAL HISTORY:   Social History     Tobacco Use     Smoking status: Former Smoker     Smokeless tobacco: Former User     Quit date: 1974   Substance Use Topics     Alcohol use: Yes     Comment: occasional        Allergies   Allergen Reactions     Dapsone Other (See Comments)     Anemia     Current Outpatient Medications   Medication     cholecalciferol (VITAMIN D3) 1000 units (25 mcg) capsule     lisinopril (PRINIVIL/ZESTRIL) 5 MG tablet     multivitamin (DEKAS ESSENTIAL) capsule     No current facility-administered medications for this visit.          Review of Systems   Per HPI.       Objective    /76   Pulse 70   Temp 96.8  F (36  C)   Resp 12   Ht 1.676 m (5' 6\")   Wt 68.9 kg (151 lb 12.8 oz)   SpO2 98%   BMI 24.50 kg/m    Body mass index is 24.5 kg/m .  Physical Exam   General: Pleasant, in no apparent distress.  Musculoskeletal: Very " mild swelling over right lateral calf region compared to left. No tenderness to palpation. Able to bear weight and ambulate.   Neurologic Exam: normal gross motor, tone coordination and no visible tremor.  Skin: No jaundice, pallor, rashes, or lesions.  Psych: Appropriate mood and affect.

## 2021-03-17 NOTE — NURSING NOTE
Patient presents to clinic with pain in right leg. He states tht he fell about a month ago and struck it and he states it feels like its not healing. Denies any wounds.  Isa Bustillo LPN ....................  3/17/2021   8:20 AM

## 2021-03-18 ASSESSMENT — ANXIETY QUESTIONNAIRES: GAD7 TOTAL SCORE: 0

## 2021-05-20 ENCOUNTER — TELEPHONE (OUTPATIENT)
Dept: FAMILY MEDICINE | Facility: CLINIC | Age: 66
End: 2021-05-20

## 2021-05-20 DIAGNOSIS — Z12.11 COLON CANCER SCREENING: Primary | ICD-10-CM

## 2021-05-20 NOTE — TELEPHONE ENCOUNTER
Patient Quality Outreach Summary      Summary:    Patient is due/failing the following:   Colonoscopy    Type of outreach:    Phone, spoke to patient/parent. colonoscopy referral place    Questions for provider review:    None                                                                                                                    MORIAH Corea       Chart routed to Care Team.

## 2021-05-27 ENCOUNTER — HOSPITAL ENCOUNTER (OUTPATIENT)
Facility: CLINIC | Age: 66
End: 2021-05-27
Attending: SURGERY | Admitting: SURGERY
Payer: MEDICARE

## 2021-06-01 ENCOUNTER — RECORDS - HEALTHEAST (OUTPATIENT)
Dept: ADMINISTRATIVE | Facility: CLINIC | Age: 66
End: 2021-06-01

## 2021-06-03 ENCOUNTER — RECORDS - HEALTHEAST (OUTPATIENT)
Dept: ADMINISTRATIVE | Facility: CLINIC | Age: 66
End: 2021-06-03

## 2021-06-03 DIAGNOSIS — Z11.59 ENCOUNTER FOR SCREENING FOR OTHER VIRAL DISEASES: ICD-10-CM

## 2021-06-06 NOTE — TELEPHONE ENCOUNTER
FNA triage call :  Conferenced by  to FNA because  Pt unable to use [facility] on-care .    Presenting problem : Pt positive for travel / and has respiratory  symptoms .  Has  Cold /   cough before leaving on trip , that is worse and became  productive  Yellow - brown  color sputum  Since being in [facility]  now , without fever or difficulty  breath . Currently : productive cough ,/  1&0 and eating and activity are ok.   Is isolation and staying home and away from adult foster care on another floor of his home ,  since return from [facility] 3/13/20 .   Guideline used : Corona Virus Exposure .  Disposition and recommendations : be seen by provider 24 hours and sent message to [facility] for call back for appt/ screening for need for corona virus testing .   Duplicated information in [facility] chart and then sent message to [facility].   Caller verbalizes understanding and denies further questions and will call back if further symptoms to triage or questions  . Lakeshia Doherty RN  - [facility] Nurse Advisor     Reason for Disposition    [1] Cough occurs AND [2] within 14 days of CORONAVIRUS EXPOSURE    Protocols used: CORONAVIRUS (2019-NCOV) EXPOSURE-A-

## 2021-06-08 NOTE — PROGRESS NOTES
Patient was an albuterol neb and a pentamadine neb. His vital signs were stable. Breath sounds clear. Patient left in no distress. Nanda Stout, LRT    RESPIRATORY CARE NOTE     Patient Name: Harley Alvarado  Today's Date: 1/3/2017     Problem List  Patient Active Problem List   Diagnosis     Allergies     Major Depression, Recurrent     Lump In / On The Skin     Abdominal Pain     Proteinuria     Benign Polyps Of The Large Intestine     Dyslipidemia     Membranous Nephropathy     Pulmonary Embolism     Latent Tuberculosis     Nephropathy     PE (pulmonary embolism)     Right inguinal hernia     Allergic conjunctivitis     GERD (gastroesophageal reflux disease)                           Nanda Stout

## 2021-06-08 NOTE — PROGRESS NOTES
Patient received Albuterol neb followed by 300 mg of Pentamidine. He tolerated both therapies well with no side effects. VSS and BS: clear pre & post. Patient left in no distress. Rosmery Gallagher, JOSÉ LUIS/JAYDEN        02/02/17 1029 02/02/17 1038 02/02/17 1109   Respiratory Assessment   Assessment Type Pre-treatment --  Post-treatment   Respiratory Pattern Regular --  Regular   Chest Assessment Chest expansion symmetrical --  Chest expansion symmetrical   Bilateral Breath Sounds Clear --  Clear   Respiratory Treatments    Medications Albuterol Pentamidine --    $ Delivery Device - Aerosol Initial Tx  HHN --  --    $ Aerosol Subsequent Tx  --  HHN --    Pre-Treatment Pulse 60 --  --    Pre-Treatment Respirations 16 --  --    Pre-Treatment Sp02 98 --  --    Breath Sounds Pre-Treatment Right Clear --  --    Breath Sounds Pre-Treatment Left Clear --  --    Post-Treatment Pulse --  --  68   Post-Treatment Respirations --  --  16   Post-Treatment Sp02 --  --  98   Breath Sounds Post-Treatment Right --  --  Clear   Breath Sounds Post-Treatment Left --  --  Clear   Position Up in chair --  --    Treatment Tolerance Tolerated well --  --    Cough Description / Suction    Cough Type Strong;Dry --  --       RESPIRATORY CARE NOTE     Patient Name: Harley Alvarado  Today's Date: 2/2/2017     Problem List  Patient Active Problem List   Diagnosis     Allergies     Major Depression, Recurrent     Lump In / On The Skin     Abdominal Pain     Proteinuria     Benign Polyps Of The Large Intestine     Dyslipidemia     Membranous Nephropathy     Pulmonary Embolism     Latent Tuberculosis     Nephropathy     PE (pulmonary embolism)     Right inguinal hernia     Allergic conjunctivitis     GERD (gastroesophageal reflux disease)                           Rosmery Gallagher

## 2021-06-23 RX ORDER — LIDOCAINE 40 MG/G
CREAM TOPICAL
Status: CANCELLED | OUTPATIENT
Start: 2021-06-23

## 2021-06-23 RX ORDER — ONDANSETRON 2 MG/ML
4 INJECTION INTRAMUSCULAR; INTRAVENOUS
Status: CANCELLED | OUTPATIENT
Start: 2021-06-23

## 2021-10-24 ENCOUNTER — HEALTH MAINTENANCE LETTER (OUTPATIENT)
Age: 66
End: 2021-10-24

## 2022-02-19 ENCOUNTER — TELEPHONE (OUTPATIENT)
Dept: NURSING | Facility: CLINIC | Age: 67
End: 2022-02-19
Payer: MEDICARE

## 2022-02-19 NOTE — TELEPHONE ENCOUNTER
Pt is calling in to speak to his doctor about getting his second Covid vaccination. Pt reports he has a complicated medical history. Pt is inquiring if he can get an aspirated vaccination. Pt was advised that a note will be sent to the clinic to see if this policy is recommended by his physician with Covid vaccinations.     Please call Harley to discuss this protocol with him, and let him know if he can request this.      Uzair Quiñones RN on 2/19/2022 at 4:30 PM

## 2022-02-21 ENCOUNTER — ALLIED HEALTH/NURSE VISIT (OUTPATIENT)
Dept: FAMILY MEDICINE | Facility: CLINIC | Age: 67
End: 2022-02-21
Payer: MEDICARE

## 2022-02-21 VITALS — TEMPERATURE: 98.2 F

## 2022-02-21 DIAGNOSIS — Z23 NEED FOR COVID-19 VACCINE: Primary | ICD-10-CM

## 2022-02-21 PROCEDURE — 0052A COVID-19,PF,PFIZER (12+ YRS): CPT

## 2022-02-21 PROCEDURE — 91305 COVID-19,PF,PFIZER (12+ YRS): CPT

## 2022-02-21 PROCEDURE — 99207 PR NO CHARGE NURSE ONLY: CPT

## 2022-03-10 ENCOUNTER — APPOINTMENT (OUTPATIENT)
Dept: GENERAL RADIOLOGY | Facility: CLINIC | Age: 67
End: 2022-03-10
Attending: EMERGENCY MEDICINE
Payer: MEDICARE

## 2022-03-10 ENCOUNTER — HOSPITAL ENCOUNTER (EMERGENCY)
Facility: CLINIC | Age: 67
Discharge: HOME | End: 2022-03-10
Attending: EMERGENCY MEDICINE | Admitting: EMERGENCY MEDICINE
Payer: MEDICARE

## 2022-03-10 VITALS
OXYGEN SATURATION: 100 % | DIASTOLIC BLOOD PRESSURE: 88 MMHG | HEART RATE: 54 BPM | TEMPERATURE: 98.1 F | WEIGHT: 150 LBS | SYSTOLIC BLOOD PRESSURE: 121 MMHG | BODY MASS INDEX: 24.11 KG/M2 | HEIGHT: 66 IN | RESPIRATION RATE: 18 BRPM

## 2022-03-10 DIAGNOSIS — R55 PRE-SYNCOPE: ICD-10-CM

## 2022-03-10 LAB
ANION GAP SERPL CALCULATED.3IONS-SCNC: 1 MMOL/L (ref 3–14)
ATRIAL RATE - MUSE: 54 BPM
BASOPHILS # BLD AUTO: 0.1 10E3/UL (ref 0–0.2)
BASOPHILS NFR BLD AUTO: 1 %
BUN SERPL-MCNC: 23 MG/DL (ref 7–30)
CALCIUM SERPL-MCNC: 9.3 MG/DL (ref 8.5–10.1)
CHLORIDE BLD-SCNC: 109 MMOL/L (ref 94–109)
CO2 SERPL-SCNC: 29 MMOL/L (ref 20–32)
CREAT SERPL-MCNC: 1.33 MG/DL (ref 0.66–1.25)
D DIMER PPP FEU-MCNC: 0.46 UG/ML FEU (ref 0–0.5)
DIASTOLIC BLOOD PRESSURE - MUSE: NORMAL MMHG
EOSINOPHIL # BLD AUTO: 0.4 10E3/UL (ref 0–0.7)
EOSINOPHIL NFR BLD AUTO: 5 %
ERYTHROCYTE [DISTWIDTH] IN BLOOD BY AUTOMATED COUNT: 11.7 % (ref 10–15)
GFR SERPL CREATININE-BSD FRML MDRD: 59 ML/MIN/1.73M2
GLUCOSE BLD-MCNC: 107 MG/DL (ref 70–99)
HCT VFR BLD AUTO: 44.9 % (ref 40–53)
HGB BLD-MCNC: 15.2 G/DL (ref 13.3–17.7)
IMM GRANULOCYTES # BLD: 0 10E3/UL
IMM GRANULOCYTES NFR BLD: 0 %
INTERPRETATION ECG - MUSE: NORMAL
LYMPHOCYTES # BLD AUTO: 2.2 10E3/UL (ref 0.8–5.3)
LYMPHOCYTES NFR BLD AUTO: 27 %
MCH RBC QN AUTO: 33.6 PG (ref 26.5–33)
MCHC RBC AUTO-ENTMCNC: 33.9 G/DL (ref 31.5–36.5)
MCV RBC AUTO: 99 FL (ref 78–100)
MONOCYTES # BLD AUTO: 1 10E3/UL (ref 0–1.3)
MONOCYTES NFR BLD AUTO: 12 %
NEUTROPHILS # BLD AUTO: 4.6 10E3/UL (ref 1.6–8.3)
NEUTROPHILS NFR BLD AUTO: 55 %
NRBC # BLD AUTO: 0 10E3/UL
NRBC BLD AUTO-RTO: 0 /100
P AXIS - MUSE: 65 DEGREES
PLATELET # BLD AUTO: 244 10E3/UL (ref 150–450)
POTASSIUM BLD-SCNC: 4.1 MMOL/L (ref 3.4–5.3)
PR INTERVAL - MUSE: 164 MS
QRS DURATION - MUSE: 90 MS
QT - MUSE: 422 MS
QTC - MUSE: 400 MS
R AXIS - MUSE: 61 DEGREES
RBC # BLD AUTO: 4.52 10E6/UL (ref 4.4–5.9)
SODIUM SERPL-SCNC: 139 MMOL/L (ref 133–144)
SYSTOLIC BLOOD PRESSURE - MUSE: NORMAL MMHG
T AXIS - MUSE: 61 DEGREES
TROPONIN I SERPL HS-MCNC: 10 NG/L
VENTRICULAR RATE- MUSE: 54 BPM
WBC # BLD AUTO: 8.3 10E3/UL (ref 4–11)

## 2022-03-10 PROCEDURE — 99285 EMERGENCY DEPT VISIT HI MDM: CPT | Mod: 25

## 2022-03-10 PROCEDURE — 93005 ELECTROCARDIOGRAM TRACING: CPT

## 2022-03-10 PROCEDURE — 71046 X-RAY EXAM CHEST 2 VIEWS: CPT

## 2022-03-10 PROCEDURE — 36415 COLL VENOUS BLD VENIPUNCTURE: CPT | Performed by: EMERGENCY MEDICINE

## 2022-03-10 PROCEDURE — 85379 FIBRIN DEGRADATION QUANT: CPT | Performed by: EMERGENCY MEDICINE

## 2022-03-10 PROCEDURE — 80048 BASIC METABOLIC PNL TOTAL CA: CPT | Performed by: EMERGENCY MEDICINE

## 2022-03-10 PROCEDURE — 96360 HYDRATION IV INFUSION INIT: CPT

## 2022-03-10 PROCEDURE — 258N000003 HC RX IP 258 OP 636: Performed by: EMERGENCY MEDICINE

## 2022-03-10 PROCEDURE — 84484 ASSAY OF TROPONIN QUANT: CPT | Performed by: EMERGENCY MEDICINE

## 2022-03-10 PROCEDURE — 85014 HEMATOCRIT: CPT | Performed by: EMERGENCY MEDICINE

## 2022-03-10 RX ADMIN — SODIUM CHLORIDE 1000 ML: 9 INJECTION, SOLUTION INTRAVENOUS at 03:11

## 2022-03-10 ASSESSMENT — ENCOUNTER SYMPTOMS
COUGH: 0
PALPITATIONS: 0
SHORTNESS OF BREATH: 0
FATIGUE: 1
NAUSEA: 0
ABDOMINAL PAIN: 0
HEADACHES: 0
FEVER: 0
CHILLS: 0
VOMITING: 0

## 2022-03-10 NOTE — ED PROVIDER NOTES
History     Chief Complaint:  Generalized Weakness      HPI   Harley Alvarado is a 66 year old male with past medical history of membranous glomerulonephritis who presents with generalized weakness and presyncope.  Patient reports that he got up to bed to go to the bathroom when he felt suddenly quite weak like he was going to pass out.  He reports he slumped over to the floor but did not have any significant trauma.  He stayed on the floor for a few minutes while he collected himself but was eventually able to walk downstairs and call 911 on his cell phone.  Patient denies any chest pain, headache, palpitations or abdominal pain preceding or during episodes.  Currently he feels back to normal and does not have any symptoms.  Earlier in the day he denies any vomiting, diarrhea, decreased p.o. intake, abdominal pain, headache, chest pain or any other symptoms.  He is otherwise been feeling well recently.  He notes history of pulmonary embolism but notes he had some significant pain in his side during this episode.  He is not on any anticoagulant medications at this time.  He denies any pain or swelling in his lower extremities.  Denies any history of dysrhythmia.    Review of Systems   Constitutional: Positive for fatigue. Negative for chills and fever.   Respiratory: Negative for cough and shortness of breath.    Cardiovascular: Negative for chest pain, palpitations and leg swelling.   Gastrointestinal: Negative for abdominal pain, nausea and vomiting.   Neurological: Negative for headaches.        Presyncope     All other systems reviewed and are negative.    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/2017     Major depression, recurrent (H) 9/29/2017     Membranous glomerulonephritis 9/29/2017     Nephropathy 12/30/2014     Pulmonary embolism (H) 12/30/2014     Patient Active Problem List    Diagnosis Date Noted     CKD (chronic kidney disease) stage 3, GFR 30-59 ml/min (H) 11/22/2019     Priority: Medium     Chronic right shoulder pain 04/23/2018     Priority: Medium     History of dysplastic nevus 11/03/2017     Priority: Medium     Long-term (current) use of anticoagulants [Z79.01] 10/10/2017     Priority: Medium     Chronic anticoagulation 10/06/2017     Priority: Medium     Dysplastic nevi 10/05/2017     Priority: Medium     Dyslipidemia 09/29/2017     Priority: Medium     Overview:   Created by Conversion       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     Priority: Medium     Overview:   Created by Conversion       Major depression, recurrent (H) 09/29/2017     Priority: Medium     Overview:   Created by Conversion    Replacement Utility updated for latest IMO load       Membranous glomerulonephritis 09/29/2017     Priority: Medium     Overview:   Created by Conversion  [facility] Annotation: Aug 26 2011  5:18PM - Angely Pat: Stage 1, s/p biopsy   8/2011    Replacement Utility updated for latest IMO load       Benign essential hypertension 09/29/2017     Priority: Medium     Immunodeficiency, unspecified (H) 10/26/2016     Priority: Medium     GERD (gastroesophageal reflux disease) 02/06/2016     Priority: Medium     Allergic conjunctivitis 04/13/2015     Priority: Medium     Nephropathy 12/30/2014     Priority: Medium     Pulmonary embolism (H) 12/30/2014     Priority: Medium     Nephrotic syndrome with membranous glomerulonephritis 06/25/2014     Priority: Medium     Overview:   Membranous nephropathy initially diagnosed in August 2011 (based on biopsy) with multiple recurrences and remissions, status post first Rituximab infusion in  "September 2016 (2 doses) with repeat dose in June 2017.          Past Surgical History:    Past Surgical History:   Procedure Laterality Date     HC REMOVAL OF TONSILS,<10 Y/O      Description: Tonsillectomy;  Recorded: 05/15/2009;     HERNIA REPAIR  2015     LAPAROSCOPIC HERNIORRHAPHY INGUINAL Right 4/23/2015    Procedure: LAPAROSCOPIC RIGHT INGUINAL HERNIA REPAIR MESH;  Surgeon: Tres Troncoso MD;  Location: [facility];  Service:      TONSILLECTOMY       Family History:    Family History   Problem Relation Age of Onset     Alzheimer Disease Mother      Heart Disease Father         CAD, MI     Dementia Mother      Depression Sister      Diabetes Other         MA, MU     Diabetes Maternal Aunt      Diabetes Maternal Uncle        Social History:  Presents to the ED alone     Physical Exam     Patient Vitals for the past 24 hrs:   BP Temp Temp src Pulse Resp SpO2 Height Weight   03/10/22 0401 121/88 98.1  F (36.7  C) -- 54 18 100 % -- --   03/10/22 0211 112/79 98.1  F (36.7  C) Oral 55 18 100 % 1.676 m (5' 6\") 68 kg (150 lb)       Physical Exam  General: Patient is awake, alert and interactive when I enter the room  Head: The scalp, face, and head appear normal  Eyes: The pupils are equal, round, and reactive to light. Conjunctivae and sclerae are normal  Neck: Normal range of motion.   CV: bradycardiac but regular.   Resp: Lungs are clear without wheezes or rales. No respiratory distress.   GI: Abdomen is soft, no rigidity, guarding, or rebound. No distension. No tenderness to palpation in any quadrant.     MS: Normal tone. Joints grossly normal without effusions. No asymmetric leg swelling, calf or thigh tenderness.    Skin: No rash or lesions noted. Normal capillary refill noted  Neuro: Speech is normal and fluent. Face is symmetric. Moving all extremities.   Psych:  Normal affect.  Appropriate interactions.    Emergency Department Course   ECG:  ECG taken at 0211, ECG read at 0213  Sinus bradycardia but " otherwise normal EKG  Ventricular rate 54  HI interval 164  QRS duration 90  QT/QTc 422/400  No significant change when compared with EKG on 8/27/2020    Imaging:  XR Chest 2 Views   Final Result   IMPRESSION:       Heart size is normal. Lungs are clear bilaterally. Mediastinum and visualized bony structures are unremarkable.          Laboratory:  Labs Ordered and Resulted from Time of ED Arrival to Time of ED Departure   BASIC METABOLIC PANEL - Abnormal       Result Value    Sodium 139      Potassium 4.1      Chloride 109      Carbon Dioxide (CO2) 29      Anion Gap 1 (*)     Urea Nitrogen 23      Creatinine 1.33 (*)     Calcium 9.3      Glucose 107 (*)     GFR Estimate 59 (*)    CBC WITH PLATELETS AND DIFFERENTIAL - Abnormal    WBC Count 8.3      RBC Count 4.52      Hemoglobin 15.2      Hematocrit 44.9      MCV 99      MCH 33.6 (*)     MCHC 33.9      RDW 11.7      Platelet Count 244      % Neutrophils 55      % Lymphocytes 27      % Monocytes 12      % Eosinophils 5      % Basophils 1      % Immature Granulocytes 0      NRBCs per 100 WBC 0      Absolute Neutrophils 4.6      Absolute Lymphocytes 2.2      Absolute Monocytes 1.0      Absolute Eosinophils 0.4      Absolute Basophils 0.1      Absolute Immature Granulocytes 0.0      Absolute NRBCs 0.0     TROPONIN I - Normal    Troponin I High Sensitivity 10     D DIMER QUANTITATIVE - Normal    D-Dimer Quantitative 0.46         Emergency Department Course:    Reviewed:  I reviewed nursing notes, vitals, past history and care everywhere    Interventions:  Medications   0.9% sodium chloride BOLUS (0 mLs Intravenous Stopped 3/10/22 0354)       Disposition:  The patient was discharged to home.    Impression & Plan      Medical Decision Making:  Harley Alvarado is a 66 year old male who presents to the ED with symptoms concerning for presyncope. Patient had recently got up from bed and urinated so its possible that this is vasovagal presyncope. I considered a broad  differential for their syncope today including cardiac arrythmia, ACS, aortic stenosis, HOCM, PE, orthostatic hypotension, drugs, situational, carotid hypersensitivity, seizure, TIA, stroke, vasovagal.  Neuro exam was normal without deficit. Symptoms and exam not consistent with stroke. EKG is nonischemic without any evidence of dysrhythmia. He has no signs of a concerning etiology for syncope at this point.  In addition,he has no family history of sudden death, no chest pain, no seizure activity or post-ictal period, no murmur, and no signs of orthostasis in the ED, no focal neurologic symptoms, and no complaints of concerning headache.  The workup in the ED is negative and the physical exam is re-assurring.  Supportive outpatient management is therefore indicated.       Diagnosis:    ICD-10-CM    1. Pre-syncope  R55      MD Peggy Lopez, Paco Sanders MD  03/10/22 0441

## 2022-03-10 NOTE — ED TRIAGE NOTES
"Arrived via medics ambulatory.    Pt c/o going upstairs to bathroom and feeling as though he may pass out.  Pt sat on floor of bathroom x 10 minutes and then \"struggled to get to phone downstairs.\" Reports calling ems, laying down on bed, feeling improved upon sitting up.  Pt tells medics \"something just doesn't feel right.\"  Pt tells this writer his entire body \"feels heavy,\" but not as heavy as earlier.  Alert, oriented.  Cooperative.    "

## 2022-03-16 ENCOUNTER — OFFICE VISIT (OUTPATIENT)
Dept: FAMILY MEDICINE | Facility: CLINIC | Age: 67
End: 2022-03-16
Payer: MEDICARE

## 2022-03-16 VITALS
TEMPERATURE: 98.2 F | WEIGHT: 144 LBS | OXYGEN SATURATION: 96 % | DIASTOLIC BLOOD PRESSURE: 82 MMHG | HEART RATE: 61 BPM | HEIGHT: 66 IN | SYSTOLIC BLOOD PRESSURE: 129 MMHG | BODY MASS INDEX: 23.14 KG/M2

## 2022-03-16 DIAGNOSIS — Z23 NEED FOR PNEUMOCOCCAL VACCINATION: ICD-10-CM

## 2022-03-16 DIAGNOSIS — N18.31 STAGE 3A CHRONIC KIDNEY DISEASE (H): ICD-10-CM

## 2022-03-16 DIAGNOSIS — Z00.00 ENCOUNTER FOR MEDICARE ANNUAL WELLNESS EXAM: ICD-10-CM

## 2022-03-16 DIAGNOSIS — R55 PRE-SYNCOPE: Primary | ICD-10-CM

## 2022-03-16 DIAGNOSIS — Z12.11 SCREENING FOR COLON CANCER: ICD-10-CM

## 2022-03-16 DIAGNOSIS — F33.42 RECURRENT MAJOR DEPRESSIVE DISORDER, IN FULL REMISSION (H): ICD-10-CM

## 2022-03-16 DIAGNOSIS — D84.9 IMMUNODEFICIENCY, UNSPECIFIED (H): ICD-10-CM

## 2022-03-16 LAB — SARS-COV-2 RNA RESP QL NAA+PROBE: NEGATIVE

## 2022-03-16 PROCEDURE — G0439 PPPS, SUBSEQ VISIT: HCPCS | Performed by: PHYSICIAN ASSISTANT

## 2022-03-16 PROCEDURE — U0003 INFECTIOUS AGENT DETECTION BY NUCLEIC ACID (DNA OR RNA); SEVERE ACUTE RESPIRATORY SYNDROME CORONAVIRUS 2 (SARS-COV-2) (CORONAVIRUS DISEASE [COVID-19]), AMPLIFIED PROBE TECHNIQUE, MAKING USE OF HIGH THROUGHPUT TECHNOLOGIES AS DESCRIBED BY CMS-2020-01-R: HCPCS | Performed by: PHYSICIAN ASSISTANT

## 2022-03-16 PROCEDURE — 99214 OFFICE O/P EST MOD 30 MIN: CPT | Mod: 25 | Performed by: PHYSICIAN ASSISTANT

## 2022-03-16 PROCEDURE — U0005 INFEC AGEN DETEC AMPLI PROBE: HCPCS | Performed by: PHYSICIAN ASSISTANT

## 2022-03-16 PROCEDURE — 36415 COLL VENOUS BLD VENIPUNCTURE: CPT | Performed by: PHYSICIAN ASSISTANT

## 2022-03-16 PROCEDURE — 90732 PPSV23 VACC 2 YRS+ SUBQ/IM: CPT | Performed by: PHYSICIAN ASSISTANT

## 2022-03-16 PROCEDURE — 80061 LIPID PANEL: CPT | Performed by: PHYSICIAN ASSISTANT

## 2022-03-16 PROCEDURE — G0009 ADMIN PNEUMOCOCCAL VACCINE: HCPCS | Performed by: PHYSICIAN ASSISTANT

## 2022-03-16 ASSESSMENT — ANXIETY QUESTIONNAIRES
1. FEELING NERVOUS, ANXIOUS, OR ON EDGE: NOT AT ALL
GAD7 TOTAL SCORE: 0
3. WORRYING TOO MUCH ABOUT DIFFERENT THINGS: NOT AT ALL
IF YOU CHECKED OFF ANY PROBLEMS ON THIS QUESTIONNAIRE, HOW DIFFICULT HAVE THESE PROBLEMS MADE IT FOR YOU TO DO YOUR WORK, TAKE CARE OF THINGS AT HOME, OR GET ALONG WITH OTHER PEOPLE: NOT DIFFICULT AT ALL
2. NOT BEING ABLE TO STOP OR CONTROL WORRYING: NOT AT ALL
5. BEING SO RESTLESS THAT IT IS HARD TO SIT STILL: NOT AT ALL
6. BECOMING EASILY ANNOYED OR IRRITABLE: NOT AT ALL
7. FEELING AFRAID AS IF SOMETHING AWFUL MIGHT HAPPEN: NOT AT ALL

## 2022-03-16 ASSESSMENT — PATIENT HEALTH QUESTIONNAIRE - PHQ9
5. POOR APPETITE OR OVEREATING: NOT AT ALL
SUM OF ALL RESPONSES TO PHQ QUESTIONS 1-9: 3

## 2022-03-16 NOTE — PATIENT INSTRUCTIONS
Patient Education   Personalized Prevention Plan  You are due for the preventive services outlined below.  Your care team is available to assist you in scheduling these services.  If you have already completed any of these items, please share that information with your care team to update in your medical record.  Health Maintenance Due   Topic Date Due     Depression Action Plan  Never done     Zoster (Shingles) Vaccine (1 of 2) Never done     Pneumococcal Vaccine (3 of 4 - PPSV23) 02/13/2020     Cholesterol Lab  12/19/2020     Flu Vaccine (1) 09/01/2021     Colorectal Cancer Screening  02/13/2022     FALL RISK ASSESSMENT  03/17/2022

## 2022-03-16 NOTE — PROGRESS NOTES
Assessment & Plan     Pre-syncope  Unclear cause of symptoms. Though no lightheadedness, remaining symptoms seem as pre syncope. Possible vasovagal. Work up at ER negative. Symptoms resolved. Reassured. However, consider atypical covid infection. Given symptoms only 6 days ago, would expect pcr testing to still be positive. Will check today. However, given no symptoms and course length, isolation is no longer needed per cdc recommendations. If work up negative, patient to continue to monitor. If recurrent in the future, though cardiovascular exam normal today, consider echo evaluation for structural etiology  - Asymptomatic COVID-19 Virus (Coronavirus) by PCR; Future  - Asymptomatic COVID-19 Virus (Coronavirus) by PCR Nose    Encounter for Medicare annual wellness exam  Stable wellness.   - [place] ANNUAL WELLNESS VISIT, PPS, SUBSEQUENT  - Lipid panel reflex to direct LDL Fasting; Future  - Lipid panel reflex to direct LDL Fasting    Immunodeficiency, unspecified (H)  Followed by nephrology.stable. updated pneumovax today.     Recurrent major depressive disorder, in full remission (H)  Stable.   PHQ 1/22/2020 3/17/2021 3/16/2022   PHQ-9 Total Score 1 0 3   Q9: Thoughts of better off dead/self-harm past 2 weeks Not at all Not at all Not at all         Stage 3a chronic kidney disease (H)  As above     Screening for colon cancer  Overdue. Updated order.   - Adult Gastro Ref - Procedure Only; Future    Need for pneumococcal vaccination    - PPSV23, IM/SUBQ (2+ YRS) - [id number]         Return in about 53 weeks (around 3/22/2023) for Annual Wellness Visit.    Cory Acosta PA-C  [facility] JOSIE Virgen is a 66 year old who presents for the following health issues     HPI     ED/UC Followup:    Facility:  [facility] Emergency Dept  Date of visit: 3/10/22  Reason for visit: Generalized Weakness  Current Status: great. No symptoms since ER visit.     Of note, patient  "states early morning around 1 AM awoke to use the bathroom and upon standing from bed, felt diffuse \"heaviness\" and nausea with sweating. Sat on floor with mild improvement and called ems. Patient states symptoms were resolved upon arrival but did not feel safe to drive so went to ER where work up negative for Pulmonary embolism, ACS, electrolyte disturbance, or other obvious cause of symptoms. No symptoms since. Does deny any lightheadedness at time and also states week prior had mild vertigo but resolved without management. Denies vertigo symptoms at time of symptoms seen at ER for.        Annual Wellness Visit    Patient has been advised of split billing requirements and indicates understanding: Yes     Are you in the first 12 months of your Medicare Part B coverage?  No    Physical Health:    In general, how would you rate your overall physical health? good    Outside of work, how many days during the week do you exercise?1 day/week    Outside of work, approximately how many minutes a day do you exercise?15-30 minutes    If you drink alcohol do you typically have >3 drinks per day or >7 drinks per week? No    Do you usually eat at least 4 servings of fruit and vegetables a day, include whole grains & fiber and avoid regularly eating high fat or \"junk\" foods? Yes    Do you have any problems taking medications regularly? No    Do you have any side effects from medications? none    Needs assistance for the following daily activities: no assistance needed    Which of the following safety concerns are present in your home?  none identified     Hearing impairment: No    In the past 6 months, have you been bothered by leaking of urine? no    Mental Health:    In general, how would you rate your overall mental or emotional health? good  PHQ-2 Score: 0    Do you feel safe in your environment? Yes    Have you ever done Advance Care Planning? (For example, a Health Directive, POLST, or a discussion with a medical provider " "or your loved ones about your wishes)? No, advance care planning information given to patient to review.  Advanced care planning was discussed at today's visit.    Fall risk:  Fallen 2 or more times in the past year?: No  Any fall with injury in the past year?: No    Cognitive Screenin) Repeat 3 items (Leader, Season, Table)    2) Clock draw: NORMAL  3) 3 item recall: Recalls 3 objects  Results: 3 items recalled: COGNITIVE IMPAIRMENT LESS LIKELY    Mini-CogTM Copyright S Kassy. Licensed by the author for use in [facility]; reprinted with permission (you@[facility]). All rights reserved.      Do you have sleep apnea, excessive snoring or daytime drowsiness?: yes    Current providers sharing in care for this patient include:   Patient Care Team:  Cory Acosta PA-C as PCP - General (Physician Assistant)  Angelica Perez MD  Coming Mora Rowan MD as Assigned PCP    Patient has been advised of split billing requirements and indicates understanding: Yes    Review of Systems   Constitutional, HEENT, cardiovascular, pulmonary, GI, , musculoskeletal, neuro, skin, endocrine and psych systems are negative, except as otherwise noted.      Objective    /82   Pulse 61   Temp 98.2  F (36.8  C) (Oral)   Ht 1.676 m (5' 6\")   Wt 65.3 kg (144 lb)   SpO2 96%   BMI 23.24 kg/m    Body mass index is 23.24 kg/m .  Physical Exam   GENERAL: healthy, alert and no distress  NECK: no carotid bruits  RESP: lungs clear to auscultation - no rales, rhonchi or wheezes  CV: regular rates and rhythm, normal S1 S2, no S3 or S4 and no murmur, click or rub  NEURO: Normal strength and tone, mentation intact and speech normal  PSYCH: mentation appears normal, affect normal/bright            "

## 2022-03-17 LAB
CHOLEST SERPL-MCNC: 181 MG/DL
FASTING STATUS PATIENT QL REPORTED: ABNORMAL
HDLC SERPL-MCNC: 49 MG/DL
LDLC SERPL CALC-MCNC: 117 MG/DL
NONHDLC SERPL-MCNC: 132 MG/DL
TRIGL SERPL-MCNC: 73 MG/DL

## 2022-03-17 ASSESSMENT — ANXIETY QUESTIONNAIRES: GAD7 TOTAL SCORE: 0

## 2022-10-16 ENCOUNTER — HEALTH MAINTENANCE LETTER (OUTPATIENT)
Age: 67
End: 2022-10-16

## 2023-02-03 ENCOUNTER — OFFICE VISIT (OUTPATIENT)
Dept: FAMILY MEDICINE | Facility: CLINIC | Age: 68
End: 2023-02-03
Payer: MEDICARE

## 2023-02-03 VITALS
TEMPERATURE: 98.1 F | HEIGHT: 66 IN | DIASTOLIC BLOOD PRESSURE: 86 MMHG | WEIGHT: 145.2 LBS | SYSTOLIC BLOOD PRESSURE: 136 MMHG | BODY MASS INDEX: 23.33 KG/M2 | HEART RATE: 59 BPM | OXYGEN SATURATION: 98 %

## 2023-02-03 DIAGNOSIS — Z23 NEED FOR PROPHYLACTIC VACCINATION AND INOCULATION AGAINST INFLUENZA: ICD-10-CM

## 2023-02-03 DIAGNOSIS — Z12.11 SCREEN FOR COLON CANCER: ICD-10-CM

## 2023-02-03 DIAGNOSIS — L30.9 ECZEMA, UNSPECIFIED TYPE: ICD-10-CM

## 2023-02-03 DIAGNOSIS — F33.42 RECURRENT MAJOR DEPRESSIVE DISORDER, IN FULL REMISSION (H): ICD-10-CM

## 2023-02-03 DIAGNOSIS — L21.9 SEBORRHEIC DERMATITIS: Primary | ICD-10-CM

## 2023-02-03 PROCEDURE — 90662 IIV NO PRSV INCREASED AG IM: CPT | Performed by: FAMILY MEDICINE

## 2023-02-03 PROCEDURE — G0008 ADMIN INFLUENZA VIRUS VAC: HCPCS | Performed by: FAMILY MEDICINE

## 2023-02-03 PROCEDURE — 99214 OFFICE O/P EST MOD 30 MIN: CPT | Mod: 25 | Performed by: FAMILY MEDICINE

## 2023-02-03 RX ORDER — HYDROCORTISONE 2.5 %
CREAM (GRAM) TOPICAL 2 TIMES DAILY
Qty: 60 G | Refills: 3 | Status: SHIPPED | OUTPATIENT
Start: 2023-02-03

## 2023-02-03 RX ORDER — TRIAMCINOLONE ACETONIDE 1 MG/G
CREAM TOPICAL 2 TIMES DAILY
Qty: 80 G | Refills: 2 | Status: SHIPPED | OUTPATIENT
Start: 2023-02-03

## 2023-02-03 NOTE — PROGRESS NOTES
Assessment & Plan     Screen for colon cancer  Opted to go with colonoscopy give his hx of polyps  - Colonoscopy Screening  Referral; Future    Recurrent major depressive disorder, in full remission (H)  In remission. Pt is not on any daily medications.    Seborrheic dermatitis  On the face, will try low potency steroids for 2 weeks, may repeat int he future.   - hydrocortisone 2.5 % cream; Apply topically 2 times daily Apply on the face, for 1 to 2 weeks, then stop, may repeat if symptoms recur.    Eczema, unspecified type  Mostly the diagnosis here, less likely psoriasis, Advised the patient to use topical moisturizers daily, use luke warm water for shower, avoid rough and exfoliating cloths, and use soft soap in the shower.  Keep the temperature in the house at lower degrees in the winter time.  - triamcinolone (KENALOG) 0.1 % external cream; Apply topically 2 times daily    Need for prophylactic vaccination and inoculation against influenza    - INFLUENZA, QUAD, HIGH DOSE, PF, 65YR + (FLUZONE HD)                 No follow-ups on file.   Follow-up Visit   Expected date:  Apr 03, 2023 (Approximate)      Follow Up Appointment Details:     Follow-up with whom?: PCP    Follow-Up for what?: Adult Preventive    How?: In Person    Is this an as-needed follow-up?: No                    Elina Puckett MD  [facility]    Tobias Virgen is a 67 year old, presenting for the following health issues:  Derm Problem      HPI     Rash  Onset/Duration: couple months  Description  Location: cheeks, forehead, arms, legs  Character: raised, flakey, red  Itching: moderate  Intensity:  moderate  Progression of Symptoms:  worsening  Accompanying signs and symptoms:   Fever: No  Body aches or joint pain: No  Sore throat symptoms: No  Recent cold symptoms: pt states had cold symptoms before flare up  History:           Previous episodes of similar rash: YES  New exposures:  None  Recent travel:  "No  Exposure to similar rash: no  Precipitating or alleviating factors: Eucerin cream.  Therapies tried and outcome: Eucerin eczema ointment    Pt remember having similar rashes in the past, usually in the winter time.    Review of Systems         Objective    /86 (BP Location: Right arm, Patient Position: Sitting, Cuff Size: Adult Regular)   Pulse 59   Temp 98.1  F (36.7  C) (Oral)   Ht 1.676 m (5' 6\")   Wt 65.9 kg (145 lb 3.2 oz)   SpO2 98%   BMI 23.44 kg/m    Body mass index is 23.44 kg/m .  Physical Exam   Skin: multiple red macules and papules with some hyperkeratosis of the skinspread on the arms, mainly and some on the legs.  Large red patch or erythematous skin on the scrotum.  Face: red patches that are larger, with flakes.                    "

## 2023-03-02 ENCOUNTER — TELEPHONE (OUTPATIENT)
Dept: FAMILY MEDICINE | Facility: CLINIC | Age: 68
End: 2023-03-02

## 2023-03-02 ENCOUNTER — TELEPHONE (OUTPATIENT)
Dept: FAMILY MEDICINE | Facility: CLINIC | Age: 68
End: 2023-03-02
Payer: MEDICARE

## 2023-03-02 DIAGNOSIS — L30.9 ECZEMA, UNSPECIFIED TYPE: Primary | ICD-10-CM

## 2023-03-02 NOTE — TELEPHONE ENCOUNTER
Hello, pt would like to be referred to [facility]? Is that possible? Who do we talk to ? Do have a referral pool anymore?

## 2023-03-02 NOTE — TELEPHONE ENCOUNTER
Pt calls, wants dermatology referral, see recent visit discussed eczema. Upon discussion pt prefers to see dermatology that he saw at [facility] in 2019, will call him, informs can see anyone, aware would need referral for derm in [facility] system if needed, pt confirmed receipt  Heather Conway RN, BSN  [facility]'

## 2023-03-02 NOTE — TELEPHONE ENCOUNTER
Order/Referral Request    Who is requesting: The patient    Orders being requested: Dermatology    Reason service is needed/diagnosis: Eczema    When are orders needed by: Asap    Has this been discussed with Provider: Yes    Does patient have a preference on a Group/Provider/Facility? [facility]    Does patient have an appointment scheduled?: No    Where to send orders: Place orders within Epic    Could we send this information to you in [facility] or would you prefer to receive a phone call?:   Patient would prefer a phone call   Okay to leave a detailed message?: Yes at Cell number on file:    Telephone Information:   Mobile [phone]

## 2023-04-13 ENCOUNTER — TELEPHONE (OUTPATIENT)
Dept: GASTROENTEROLOGY | Facility: CLINIC | Age: 68
End: 2023-04-13
Payer: MEDICARE

## 2023-04-13 ENCOUNTER — HOSPITAL ENCOUNTER (OUTPATIENT)
Facility: CLINIC | Age: 68
End: 2023-04-13
Attending: COLON & RECTAL SURGERY | Admitting: COLON & RECTAL SURGERY
Payer: MEDICARE

## 2023-04-13 NOTE — TELEPHONE ENCOUNTER
Screening Questions  BLUE  KIND OF PREP RED  LOCATION [review exclusion criteria] GREEN  SEDATION TYPE        Y Are you active on mychart?       Elina Puckett Ordering/Referring Provider?        MEDICARE BCBS What type of coverage do you have?      N Have you had a positive covid test in the last 14 days?     23.4 1. BMI  [BMI 40+ - review exclusion criteria]    Y  2. Are you able to give consent for your medical care? [IF NO,RN REVIEW]          N  3. Are you taking any prescription pain medications on a routine schedule   (ex narcotics: oxycodone, roxicodone, oxycontin,  and percocet)? [RN Review]        N  3a. EXTENDED PREP What kind of prescription?     N 4. Do you have any chemical dependencies such as alcohol, street drugs, or methadone?        **If yes 3- 5 , please schedule with MAC sedation.**          IF YES TO ANY 6 - 10 - HOSPITAL SETTING ONLY.     N 6.   Do you need assistance transferring?     N 7.   Have you had a heart or lung transplant?    N 8.   Are you currently on dialysis?   N 9.   Do you use daily home oxygen?   N 10. Do you take nitroglycerin?   10a. N If yes, how often?     11. [FEMALES]  N Are you currently pregnant?    11a. N If yes, how many weeks? [ Greater than 12 weeks, OR NEEDED]    N 12. Do you have Pulmonary Hypertension? *NEED PAC APPT AT [facility] w/ MAC*     N 13. [review exclusion criteria]  Do you have any implantable devices in your body (pacemaker, defib, LVAD)?    N 14. In the past 6 months, have you had any heart related issues including cardiomyopathy or heart attack?     14a. N If yes, did it require cardiac stenting if so when?     N 15. Have you had a stroke or Transient ischemic attack (TIA - aka  mini stroke ) within 6 months?      N 16. Do you have mod to severe Obstructive Sleep Apnea?  [Hospital only]    N 17. Do you have SEVERE AND UNCONTROLLED asthma? *NEED PAC APPT AT [facility] w/MAC*     18. Are you currently taking any blood thinners?     18a. No. Continue to  "19.   18b. Yes/no Blood Thinner: No [CONTINUE TO #19]    N 19. Do you take the medication Phentermine?    19a. If yes, \"Hold for 7 days before procedure.  Please consult your prescribing provider if you have questions about holding this medication.\"     Y   20. Do you have chronic kidney disease?      N  21. Do you have a diagnosis of diabetes?     Y OCCASIONALLY  22. On a regular basis do you go 3-5 days between bowel movements?      23. Preferred LOCAL Pharmacy for Pre Prescription    [ LIST ONLY ONE PHARMACY]          [id number] - [place], [place] - [address]        - CLOSING REMINDERS -    Informed patient they will need an adult    Cannot take any type of public or medical transportation alone    Conscious Sedation- Needs  for 6 hours after the procedure       MAC/General-Needs  for 24 hours after procedure    Pre-Procedure Covid test to be completed [[facility] PCR Testing Required]    Confirmed Nurse will call to complete assessment       - SCHEDULING DETAILS -  N Hospital Setting Required? If yes, what is the exclusion?: N   SIRANCY  Surgeon    4/25  Date of Procedure  Lower Endoscopy [Colonoscopy]  Type of Procedure Scheduled  [facility] Location   [facility]- If you answer yes to questions #8, #20, #21 [  pts ]Which Colonoscopy Prep was Sent?     MODERATE Sedation Type     N PAC / Pre-op Required                 "

## 2023-04-24 ENCOUNTER — TELEPHONE (OUTPATIENT)
Dept: GASTROENTEROLOGY | Facility: CLINIC | Age: 68
End: 2023-04-24
Payer: MEDICARE

## 2023-04-24 ENCOUNTER — HOSPITAL ENCOUNTER (OUTPATIENT)
Facility: CLINIC | Age: 68
End: 2023-04-24
Attending: INTERNAL MEDICINE | Admitting: INTERNAL MEDICINE
Payer: MEDICARE

## 2023-04-24 NOTE — TELEPHONE ENCOUNTER
Caller: Harley Alvarado    Reason for Reschedule/Cancellation (please be detailed, any staff messages or encounters to note?): Patient stated that he did not receive his instructions in the mail and was not able to prep for procedure.      Prior to reschedule please review:    Ordering Provider:АЛЕКСАНДР MAZARIEGOS    Sedation per order:MODERATE    Does patient have any ASC Exclusions, please identify?: NO      Notes on Cancelled Procedure:    Procedure:Lower Endoscopy [Colonoscopy]     Date: 4/25    Location:[facility]; [address] [place] [address]    Surgeon: RY        Rescheduled: Yes    Procedure: Lower Endoscopy [Colonoscopy]     Date: 7/12    Location:[facility]; [address] [facility] [place] [address]    Surgeon: RY    Sedation Level Scheduled  MODERATE,  Reason for Sedation Level PER ORDER    Prep/Instructions updated and sent: LETTER

## 2023-06-01 ENCOUNTER — HEALTH MAINTENANCE LETTER (OUTPATIENT)
Age: 68
End: 2023-06-01

## 2023-06-09 ENCOUNTER — TELEPHONE (OUTPATIENT)
Dept: GASTROENTEROLOGY | Facility: CLINIC | Age: 68
End: 2023-06-09
Payer: MEDICARE

## 2023-06-09 NOTE — TELEPHONE ENCOUNTER
Caller:   Reason for Reschedule/Cancellation (please be detailed, any staff messages or encounters to note?): CONDLICT      Prior to reschedule please review:    Ordering Provider:АЛЕКСАНДР MAZARIEGOS    Sedation per order:CS    Does patient have any ASC Exclusions, please identify?:       Notes on Cancelled Procedure:    Procedure:Lower Endoscopy [Colonoscopy]     Date: 7/12     Location:[facility]; [address] [place] [address]    Surgeon: RY        Rescheduled: No

## 2024-03-21 ENCOUNTER — PATIENT OUTREACH (OUTPATIENT)
Dept: FAMILY MEDICINE | Facility: CLINIC | Age: 69
End: 2024-03-21
Payer: MEDICARE

## 2024-03-21 NOTE — TELEPHONE ENCOUNTER
Patient Quality Outreach    Patient is due for the following:   Physical Annual Wellness Visit    Next Steps:   Schedule a Annual Wellness Visit    Type of outreach:    Sent [employer] message.    Next Steps:  Reach out within 90 days via Letter.    Max number of attempts reached: No. Will try again in 90 days if patient still on fail list.    Questions for provider review:    None           Alicia Astorga, [facility]

## 2024-08-10 ENCOUNTER — HEALTH MAINTENANCE LETTER (OUTPATIENT)
Age: 69
End: 2024-08-10

## 2024-12-17 ENCOUNTER — OFFICE VISIT (OUTPATIENT)
Dept: INTERNAL MEDICINE | Facility: CLINIC | Age: 69
End: 2024-12-17
Payer: MEDICARE

## 2024-12-17 VITALS
RESPIRATION RATE: 18 BRPM | TEMPERATURE: 98.1 F | SYSTOLIC BLOOD PRESSURE: 130 MMHG | HEIGHT: 66 IN | BODY MASS INDEX: 24.81 KG/M2 | DIASTOLIC BLOOD PRESSURE: 88 MMHG | WEIGHT: 154.4 LBS | HEART RATE: 78 BPM | OXYGEN SATURATION: 96 %

## 2024-12-17 DIAGNOSIS — B96.89 ACUTE BACTERIAL RHINOSINUSITIS: Primary | ICD-10-CM

## 2024-12-17 DIAGNOSIS — J01.90 ACUTE BACTERIAL RHINOSINUSITIS: Primary | ICD-10-CM

## 2024-12-17 PROCEDURE — 99213 OFFICE O/P EST LOW 20 MIN: CPT

## 2024-12-17 RX ORDER — LOSARTAN POTASSIUM 100 MG/1
100 TABLET ORAL DAILY
COMMUNITY

## 2024-12-17 NOTE — PROGRESS NOTES
Assessment & Plan   Problem List Items Addressed This Visit    None  Visit Diagnoses       Acute bacterial rhinosinusitis    -  Primary    Relevant Medications    amoxicillin-clavulanate (AUGMENTIN) 875-125 MG tablet                  MEDICATIONS:   Orders Placed This Encounter   Medications    amoxicillin-clavulanate (AUGMENTIN) 875-125 MG tablet     Sig: Take 1 tablet by mouth 2 times daily for 7 days.     Dispense:  14 tablet     Refill:  0    losartan (COZAAR) 100 MG tablet     Sig: Take 100 mg by mouth daily.     Medications Discontinued During This Encounter   Medication Reason    lisinopril (PRINIVIL/ZESTRIL) 5 MG tablet           - Continue other medications without change      Subjective   Param is a 69 year old, presenting for the following health issues: Pt reports that he has sinus polyps diagnosed 7/22/2024. Pt has been having blood tinged drainage from the sinus rinses. Pt started over the last 2 weeks he started with sniffles. Pt reporting pressure in frontal area bilaterally and ethmoid. Pt admits to some sore throat. Pt denies ear pressure, no SOB, no cough.  Patient also reports that he has a tooth that will probably be extracted today that is sensitive to cold to heat and pressure.  Explained to patient that it is essential that he shares with the doctor that he is already taking Augmentin prior to the dentist contemplating prescribing the patient an antibiotic.    Suggested Flonase nasal spray to help dry up the secretions and reduce the inflammation of his polyps patient declined.  Provided literature about sinusitis.  Discussed with patient about taking Augmentin with food or yogurt to reduce stomach upset patient replied that he already eats yogurt every day.       12/17/2024     9:32 AM   Additional Questions   Roomed by azamit   Accompanied by self         12/17/2024     9:32 AM   Patient Reported Additional Medications   Patient reports taking the following new medications none     Sinus  "Problem                    Review of Systems  Constitutional, HEENT, cardiovascular, pulmonary, gi and gu systems are negative, except as otherwise noted.      Objective    /88 (BP Location: Right arm, Patient Position: Sitting, Cuff Size: Adult Regular)   Pulse 78   Temp 98.1  F (36.7  C) (Oral)   Resp 18   Ht 1.676 m (5' 6\")   Wt 70 kg (154 lb 6.4 oz)   SpO2 96%   BMI 24.92 kg/m    Body mass index is 24.92 kg/m .  Physical Exam   GENERAL: alert and no distress  HENT: normal cephalic/atraumatic, right ear: normal: no effusions, no erythema, normal landmarks, left ear: normal: no effusions, no erythema, normal landmarks, nose and mouth without ulcers or lesions, oral mucous membranes moist, sinuses: maxillary, ethmoid tenderness on both sides, and cobblestone oropharynx  NECK: no adenopathy, no asymmetry, masses, or scars, and thyroid normal to palpation  RESP: lungs clear to auscultation - no rales, rhonchi or wheezes  CV: regular rate and rhythm, normal S1 S2, no S3 or S4, no murmur, click or rub, no peripheral edema  ABDOMEN: soft, nontender, no hepatosplenomegaly, no masses and bowel sounds normal  MS: no gross musculoskeletal defects noted, no edema  SKIN: no suspicious lesions or rashes  NEURO: Normal strength and tone, mentation intact and speech normal  PSYCH: mentation appears normal, affect normal/bright            Signed Electronically by: MICH Eaton CNP    Answers submitted by the patient for this visit:  Patient Health Questionnaire (Submitted on 12/17/2024)  PHQ9 TOTAL SCORE: Incomplete    "

## 2025-08-16 ENCOUNTER — HEALTH MAINTENANCE LETTER (OUTPATIENT)
Age: 70
End: 2025-08-16